# Patient Record
Sex: FEMALE | Race: WHITE | HISPANIC OR LATINO | Employment: FULL TIME | URBAN - METROPOLITAN AREA
[De-identification: names, ages, dates, MRNs, and addresses within clinical notes are randomized per-mention and may not be internally consistent; named-entity substitution may affect disease eponyms.]

---

## 2017-05-24 ENCOUNTER — NON-PROVIDER VISIT (OUTPATIENT)
Dept: URGENT CARE | Facility: PHYSICIAN GROUP | Age: 27
End: 2017-05-24

## 2017-05-24 ENCOUNTER — NON-PROVIDER VISIT (OUTPATIENT)
Dept: OCCUPATIONAL MEDICINE | Facility: CLINIC | Age: 27
End: 2017-05-24

## 2017-05-24 DIAGNOSIS — Z11.1 ENCOUNTER FOR PPD TEST: ICD-10-CM

## 2017-05-24 DIAGNOSIS — Z02.1 PRE-EMPLOYMENT DRUG SCREENING: ICD-10-CM

## 2017-05-24 LAB
AMP AMPHETAMINE: NORMAL
BAR BARBITURATES: NORMAL
BZO BENZODIAZEPINES: NORMAL
COC COCAINE: NORMAL
INT CON NEG: NEGATIVE
INT CON POS: POSITIVE
MDMA ECSTASY: NORMAL
MET METHAMPHETAMINES: NORMAL
MTD METHADONE: NORMAL
OPI OPIATES: NORMAL
OXY OXYCODONE: NORMAL
PCP PHENCYCLIDINE: NORMAL
POC URINE DRUG SCREEN OCDRS: NORMAL
THC: NORMAL

## 2017-05-24 PROCEDURE — 86580 TB INTRADERMAL TEST: CPT | Performed by: PREVENTIVE MEDICINE

## 2017-05-24 PROCEDURE — 80305 DRUG TEST PRSMV DIR OPT OBS: CPT | Performed by: FAMILY MEDICINE

## 2017-05-24 NOTE — MR AVS SNAPSHOT
Sherrell Mukherjee   2017 9:45 AM   Non-Provider Visit   MRN: [id number]    Department:  [facility]   Dept Phone:  [phone]    Description:  Female : 1990   Provider:  [facility]           Reason for Visit     Drug / Alcohol Assessment           Allergies as of 2017     No Known Allergies      You were diagnosed with     Pre-employment drug screening   [987030]         Vital Signs     Smoking Status                   Never Smoker            Basic Information     Date Of Birth Sex Race Ethnicity Preferred Language    1990 Female White Non- English      Your appointments     May 31, 2017  2:50 PM   Non Provider 1 with Jarvis Goldstein M.D.   [address]  [facility] [address] [phone]           You will be receiving a confirmation call a few days before your appointment from our automated call confirmation system.              Health Maintenance        Date Due Completion Dates    IMM HEP B VACCINE (1 of 3 - Primary Series) 1991 ---    IMM HEP A VACCINE (1 of 2 - Standard Series) 1992 ---    IMM HPV VACCINE (1 of 3 - Female 3 Dose Series) 2002 ---    IMM VARICELLA (CHICKENPOX) VACCINE (1 of 2 - 2 Dose Adolescent Series) 2004 ---    IMM DTaP/Tdap/Td Vaccine (1 - Tdap) 2010 ---    PAP SMEAR 2012 ---            Results     POCT 11 Panel Urine Drug Screen      Component    AMPHETAMINE    COCAINE    POC THC    METHAMPHETAMINES    OPIATES    PHENCYCLIDINE    BENZODIAZIPINES    BARBITURATES    METHADONE    MDMA Ecstasy    OXYCODONE    Urine Drug Screen    mfma    Internal Control Positive    Positive    Internal Control Negative    Negative                        Current Immunizations     No immunizations on file.      Below and/or attached are the medications your provider expects you to take. Review all of your home medications and newly ordered medications with your  provider and/or pharmacist. Follow medication instructions as directed by your provider and/or pharmacist. Please keep your medication list with you and share with your provider. Update the information when medications are discontinued, doses are changed, or new medications (including over-the-counter products) are added; and carry medication information at all times in the event of emergency situations     Allergies:  No Known Allergies          Medications  Valid as of: May 24, 2017 -  6:06 PM    Generic Name Brand Name Tablet Size Instructions for use    Cyclobenzaprine HCl (Tab) FLEXERIL 10 MG Take 1 Tab by mouth 3 times a day as needed.        Hydrocodone-Acetaminophen (Tab) NORCO 5-325 MG Take 1-2 Tabs by mouth every 6 hours as needed (Do not take within 6 hours of driving or working).        .                 Medicines prescribed today were sent to:     [facility] [address] [place] [address] [place] - [address] [facility] [address]    [address] [place] [address]    Phone: [phone] Fax: [phone]    Open 24 Hours?: No      Medication refill instructions:       If your prescription bottle indicates you have medication refills left, it is not necessary to call your provider’s office. Please contact your pharmacy and they will refill your medication.    If your prescription bottle indicates you do not have any refills left, you may request refills at any time through one of the following ways: The online [employer] system (except Urgent Care), by calling your provider’s office, or by asking your pharmacy to contact your provider’s office with a refill request. Medication refills are processed only during regular business hours and may not be available until the next business day. Your provider may request additional information or to have a follow-up visit with you prior to refilling your medication.   *Please Note: Medication refills are assigned a new Rx number when refilled electronically.  Your pharmacy may indicate that no refills were authorized even though a new prescription for the same medication is available at the pharmacy. Please request the medicine by name with the pharmacy before contacting your provider for a refill.        Instructions    Sherrell Mukherjee is a 26 y.o. female here for a non-provider visit for uds    If abnormal was an in office provider notified today (if so, indicate provider)? No  Routed to PCP? No              Letyano Access Code: [email or web address]  Expires: 6/18/2017  4:16 AM    Letyano  A secure, online tool to manage your health information     [employer]’s Letyano® is a secure, online tool that connects you to your personalized health information from the privacy of your home -- day or night - making it very easy for you to manage your healthcare. Once the activation process is completed, you can even access your medical information using the Letyano jessica, which is available for free in the Apple Jessica store or Google Play store.     Letyano provides the following levels of access (as shown below):   My Chart Features   [facility] Doctor [facility]  Specialists [facility]  Urgent  [facility]  Primary Care  Doctor   Email your healthcare team securely and privately 24/7 X X X    Manage appointments: schedule your next appointment; view details of past/upcoming appointments X      Request prescription refills. X      View recent personal medical records, including lab and immunizations X X X X   View health record, including health history, allergies, medications X X X X   Read reports about your outpatient visits, procedures, consult and ER notes X X X X   See your discharge summary, which is a recap of your hospital and/or ER visit that includes your diagnosis, lab results, and care plan. X X       How to register for Letyano:  1. Go to  [email or web address].  2. Click on the Sign Up Now box, which takes you to the New Member Sign Up page. You will  need to provide the following information:  a. Enter your [employer] Access Code exactly as it appears at the top of this page. (You will not need to use this code after you’ve completed the sign-up process. If you do not sign up before the expiration date, you must request a new code.)   b. Enter your date of birth.   c. Enter your home email address.   d. Click Submit, and follow the next screen’s instructions.  3. Create a Personal Life Mediat ID. This will be your Cymphonix login ID and cannot be changed, so think of one that is secure and easy to remember.  4. Create a Cymphonix password. You can change your password at any time.  5. Enter your Password Reset Question and Answer. This can be used at a later time if you forget your password.   6. Enter your e-mail address. This allows you to receive e-mail notifications when new information is available in Cymphonix.  7. Click Sign Up. You can now view your health information.    For assistance activating your Cymphonix account, call [phone]

## 2017-05-24 NOTE — MR AVS SNAPSHOT
Sherrell Rosenthal   2017 1:00 PM   Non-Provider Visit   MRN: [id number]    Department:  [facility]   Dept Phone:  [phone]    Description:  Female : 1990   Provider:  [facility] SANTANA SHER R.N.           Reason for Visit     PPD Placement           Allergies as of 2017     Not on File      You were diagnosed with     Encounter for PPD test   [[id number]]         Basic Information     Date Of Birth Sex Race Ethnicity Preferred Language    1990 Female White  Origin (Papua [place]inean,Guatemalan,Eritrean,Zackary, etc) English      Health Maintenance     Patient has no pending health maintenance at this time      Current Immunizations     Tuberculin Skin Test 2017 12:31 PM      Below and/or attached are the medications your provider expects you to take. Review all of your home medications and newly ordered medications with your provider and/or pharmacist. Follow medication instructions as directed by your provider and/or pharmacist. Please keep your medication list with you and share with your provider. Update the information when medications are discontinued, doses are changed, or new medications (including over-the-counter products) are added; and carry medication information at all times in the event of emergency situations     Allergies:  (Not on file)          Medications  Valid as of: May 24, 2017 -  3:12 PM    Generic Name Brand Name Tablet Size Instructions for use    .                 Medicines prescribed today were sent to:     None      Medication refill instructions:       If your prescription bottle indicates you have medication refills left, it is not necessary to call your provider’s office. Please contact your pharmacy and they will refill your medication.    If your prescription bottle indicates you do not have any refills left, you may request refills at any time through one of the following ways: The online [employer] system (except Urgent Care), by calling your  provider’s office, or by asking your pharmacy to contact your provider’s office with a refill request. Medication refills are processed only during regular business hours and may not be available until the next business day. Your provider may request additional information or to have a follow-up visit with you prior to refilling your medication.   *Please Note: Medication refills are assigned a new Rx number when refilled electronically. Your pharmacy may indicate that no refills were authorized even though a new prescription for the same medication is available at the pharmacy. Please request the medicine by name with the pharmacy before contacting your provider for a refill.           MyChart Status: Patient Declined

## 2017-05-24 NOTE — PATIENT INSTRUCTIONS
Sherrell Yaz is a 26 y.o. female here for a non-provider visit for uds    If abnormal was an in office provider notified today (if so, indicate provider)? No  Routed to PCP? No

## 2017-05-27 ENCOUNTER — HOSPITAL ENCOUNTER (OUTPATIENT)
Dept: RADIOLOGY | Facility: MEDICAL CENTER | Age: 27
End: 2017-05-27
Attending: NURSE PRACTITIONER

## 2017-05-27 ENCOUNTER — NON-PROVIDER VISIT (OUTPATIENT)
Dept: URGENT CARE | Facility: CLINIC | Age: 27
End: 2017-05-27

## 2017-05-27 DIAGNOSIS — R76.11 POSITIVE PPD: ICD-10-CM

## 2017-05-27 LAB — TB WHEAL 3D P 5 TU DIAM: NORMAL MM

## 2017-05-27 PROCEDURE — 71020 DX-CHEST-2 VIEWS: CPT

## 2017-05-27 NOTE — PROGRESS NOTES
Sherrell Rosenthal is a 26 y.o. female here for a non-provider visit for PPD reading -- Step 1 of 2.      1.  Resulted in Epic under enter/edit results? Yes   2.  TB evaluation questionnaire scanned into chart and original given to patient?Yes      3. Was induration greater than 0 mm? Yes, verified by Provider: Caroline Villafana.         Routed to PCP? No          Positive PPD. Pt reported she always tests positive. Born [place] of [place].   CXR ordered.

## 2017-05-27 NOTE — MR AVS SNAPSHOT
Sherrell Rosenthal   2017 9:15 AM   Non-Provider Visit   MRN: [id number]    Department:  [facility]   Dept Phone:  [phone]    Description:  Female : 1990   Provider:  YELITZA Apodaca           Reason for Visit     PPD Reading           Allergies as of 2017     Not on File      You were diagnosed with     Positive PPD   [[id number]]         Basic Information     Date Of Birth Sex Race Ethnicity Preferred Language    1990 Female White  Origin (Syriac,St Lucian,Mosotho,Zackary, etc) English      Health Maintenance     Patient has no pending health maintenance at this time      Current Immunizations     Tuberculin Skin Test 2017 12:31 PM      Below and/or attached are the medications your provider expects you to take. Review all of your home medications and newly ordered medications with your provider and/or pharmacist. Follow medication instructions as directed by your provider and/or pharmacist. Please keep your medication list with you and share with your provider. Update the information when medications are discontinued, doses are changed, or new medications (including over-the-counter products) are added; and carry medication information at all times in the event of emergency situations     Allergies:  (Not on file)          Medications  Valid as of: May 27, 2017 -  9:41 AM    Generic Name Brand Name Tablet Size Instructions for use    .                 Medicines prescribed today were sent to:     None      Medication refill instructions:       If your prescription bottle indicates you have medication refills left, it is not necessary to call your provider’s office. Please contact your pharmacy and they will refill your medication.    If your prescription bottle indicates you do not have any refills left, you may request refills at any time through one of the following ways: The online [employer] system (except Urgent Care), by calling your provider’s  office, or by asking your pharmacy to contact your provider’s office with a refill request. Medication refills are processed only during regular business hours and may not be available until the next business day. Your provider may request additional information or to have a follow-up visit with you prior to refilling your medication.   *Please Note: Medication refills are assigned a new Rx number when refilled electronically. Your pharmacy may indicate that no refills were authorized even though a new prescription for the same medication is available at the pharmacy. Please request the medicine by name with the pharmacy before contacting your provider for a refill.        Your To Do List     Future Labs/Procedures Complete By Expires    DX-CHEST-2 VIEWS  As directed 5/27/2018         [facility] Status: Patient Declined

## 2017-05-31 ENCOUNTER — NON-PROVIDER VISIT (OUTPATIENT)
Dept: OCCUPATIONAL MEDICINE | Facility: CLINIC | Age: 27
End: 2017-05-31

## 2017-05-31 VITALS
OXYGEN SATURATION: 97 % | DIASTOLIC BLOOD PRESSURE: 70 MMHG | WEIGHT: 152 LBS | SYSTOLIC BLOOD PRESSURE: 122 MMHG | BODY MASS INDEX: 28.7 KG/M2 | HEIGHT: 61 IN | TEMPERATURE: 97.3 F | RESPIRATION RATE: 12 BRPM | HEART RATE: 96 BPM

## 2017-05-31 DIAGNOSIS — Z02.89 HEALTH EXAMINATION OF DEFINED SUBPOPULATION: ICD-10-CM

## 2017-05-31 PROCEDURE — 8915 PR COMPREHENSIVE PHYSICAL: Performed by: PREVENTIVE MEDICINE

## 2018-11-27 ENCOUNTER — OFFICE VISIT (OUTPATIENT)
Dept: URGENT CARE | Facility: MEDICAL CENTER | Age: 28
End: 2018-11-27
Payer: COMMERCIAL

## 2018-11-27 VITALS
DIASTOLIC BLOOD PRESSURE: 78 MMHG | HEART RATE: 82 BPM | OXYGEN SATURATION: 99 % | BODY MASS INDEX: 32.69 KG/M2 | TEMPERATURE: 97.6 F | WEIGHT: 173 LBS | SYSTOLIC BLOOD PRESSURE: 116 MMHG

## 2018-11-27 DIAGNOSIS — M54.6 ACUTE LEFT-SIDED THORACIC BACK PAIN: ICD-10-CM

## 2018-11-27 PROCEDURE — 99213 OFFICE O/P EST LOW 20 MIN: CPT | Performed by: PHYSICIAN ASSISTANT

## 2018-11-27 ASSESSMENT — ENCOUNTER SYMPTOMS
TINGLING: 0
SENSORY CHANGE: 0
WEAKNESS: 0
PALPITATIONS: 0
MYALGIAS: 1
NUMBNESS: 0
SORE THROAT: 0
DIZZINESS: 0
FEVER: 0
SHORTNESS OF BREATH: 0
NAUSEA: 0
COUGH: 0
CHILLS: 0
SPUTUM PRODUCTION: 0
WHEEZING: 0
PARESTHESIAS: 0
FOCAL WEAKNESS: 0
SINUS PAIN: 0
PARESIS: 0
BACK PAIN: 1

## 2018-11-28 NOTE — PROGRESS NOTES
Subjective:      Sherrell Mukherjee is a 28 y.o. female who presents with Shoulder Pain (woke up with pain in left shoulder yesterday, got worse throughout day, hurts worse when she sneezes , pregnant so not taking anything)            Back Pain   This is a new problem. The current episode started today (Pain by left shoulder pain this am upon awakening ). The problem occurs constantly. The problem has been waxing and waning since onset. The quality of the pain is described as aching. The pain does not radiate. The pain is moderate. Exacerbated by: movement of left arm, breathing or sneezing  Pertinent negatives include no chest pain, fever, numbness, paresis, paresthesias, tingling or weakness. (No cough or shortness of breath. No hemoptysis or calf pain ) She has tried nothing for the symptoms.   Patient is 13 weeks pregnant.    .History reviewed. No pertinent past medical history.    History reviewed. No pertinent surgical history.    History reviewed. No pertinent family history.    No Known Allergies    Medications, Allergies, and current problem list reviewed today in Epic      Review of Systems   Constitutional: Negative for chills, fever and malaise/fatigue.   HENT: Negative for congestion, ear discharge, ear pain, sinus pain and sore throat.    Respiratory: Negative for cough, sputum production, shortness of breath and wheezing.    Cardiovascular: Negative for chest pain, palpitations and leg swelling.   Gastrointestinal: Negative for nausea.   Musculoskeletal: Positive for back pain and myalgias.   Skin: Negative for rash.   Neurological: Negative for dizziness, tingling, sensory change, focal weakness, weakness, numbness and paresthesias.     All other systems reviewed and are negative.        Objective:     /78 (BP Location: Right arm, Patient Position: Sitting, BP Cuff Size: Adult)   Pulse 82   Temp 36.4 °C (97.6 °F) (Temporal)   Wt 78.5 kg (173 lb)   SpO2 99%   BMI 32.69 kg/m²       Physical Exam   Constitutional: She is oriented to person, place, and time. She appears well-developed and well-nourished. No distress.   HENT:   Head: Normocephalic and atraumatic.   Mouth/Throat: Oropharynx is clear and moist.   Eyes: Conjunctivae are normal.   Neck: Neck supple.   Cardiovascular: Normal rate, regular rhythm and normal heart sounds.  Exam reveals no gallop and no friction rub.    No murmur heard.  Pulmonary/Chest: Effort normal. No respiratory distress. She has no decreased breath sounds. She has no wheezes. She has no rhonchi. She has no rales.           Musculoskeletal:   Lower extremities without edema or erythema. NTTP.   Lymphadenopathy:     She has no cervical adenopathy.   Neurological: She is alert and oriented to person, place, and time. No cranial nerve deficit.   Skin: Skin is warm and dry. No rash noted.   Psychiatric: She has a normal mood and affect. Her behavior is normal. Judgment and thought content normal.               Assessment/Plan:     1. Acute left-sided thoracic back pain    - vitals normal. Lung sounds clear.      - pain reproduced with left arm movement and palpation suggesting  musculoskeletal origin for pain.  - encouraged rest, ice, heat, OTC Tylenol.  NO IBUPROFEN     Differential diagnoses, Supportive care, and indications for immediate follow-up discussed with patient.   Instructed to return to clinic or nearest emergency department for any change in condition, further concerns, or worsening of symptoms.    The patient demonstrated a good understanding and agreed with the treatment plan.    Ginna Tineo P.A.-C.

## 2018-12-03 ENCOUNTER — APPOINTMENT (OUTPATIENT)
Dept: OBGYN | Facility: CLINIC | Age: 28
End: 2018-12-03
Payer: COMMERCIAL

## 2019-01-24 ENCOUNTER — HOSPITAL ENCOUNTER (OUTPATIENT)
Dept: HOSPITAL 8 - LDOP | Age: 29
Discharge: HOME | End: 2019-01-24
Attending: OBSTETRICS & GYNECOLOGY
Payer: COMMERCIAL

## 2019-01-24 VITALS — BODY MASS INDEX: 32.86 KG/M2 | HEIGHT: 62 IN | WEIGHT: 178.57 LBS

## 2019-01-24 DIAGNOSIS — O26.892: Primary | ICD-10-CM

## 2019-01-24 DIAGNOSIS — R10.2: ICD-10-CM

## 2019-01-24 DIAGNOSIS — Z3A.21: ICD-10-CM

## 2019-01-24 LAB — MICROSCOPIC: (no result)

## 2019-01-24 PROCEDURE — 81003 URINALYSIS AUTO W/O SCOPE: CPT

## 2019-01-24 PROCEDURE — 87086 URINE CULTURE/COLONY COUNT: CPT

## 2019-01-24 PROCEDURE — 99201: CPT

## 2019-01-24 PROCEDURE — G0463 HOSPITAL OUTPT CLINIC VISIT: HCPCS

## 2019-01-24 PROCEDURE — 59025 FETAL NON-STRESS TEST: CPT

## 2019-03-25 ENCOUNTER — HOSPITAL ENCOUNTER (EMERGENCY)
Dept: HOSPITAL 8 - ED | Age: 29
Discharge: HOME | End: 2019-03-25
Payer: COMMERCIAL

## 2019-03-25 VITALS — DIASTOLIC BLOOD PRESSURE: 62 MMHG | SYSTOLIC BLOOD PRESSURE: 122 MMHG

## 2019-03-25 VITALS — WEIGHT: 186.29 LBS | BODY MASS INDEX: 35.17 KG/M2 | HEIGHT: 61 IN

## 2019-03-25 DIAGNOSIS — Z3A.29: ICD-10-CM

## 2019-03-25 DIAGNOSIS — G44.209: ICD-10-CM

## 2019-03-25 DIAGNOSIS — O26.893: Primary | ICD-10-CM

## 2019-03-25 LAB
ALBUMIN SERPL-MCNC: 2.6 G/DL (ref 3.4–5)
ALP SERPL-CCNC: 80 U/L (ref 45–117)
ALT SERPL-CCNC: 25 U/L (ref 12–78)
ANION GAP SERPL CALC-SCNC: 9 MMOL/L (ref 5–15)
BASOPHILS # BLD AUTO: 0.04 X10^3/UL (ref 0–0.1)
BASOPHILS NFR BLD AUTO: 1 % (ref 0–1)
BILIRUB SERPL-MCNC: 0.3 MG/DL (ref 0.2–1)
CALCIUM SERPL-MCNC: 8.2 MG/DL (ref 8.5–10.1)
CHLORIDE SERPL-SCNC: 111 MMOL/L (ref 98–107)
CREAT SERPL-MCNC: 0.61 MG/DL (ref 0.55–1.02)
CULTURE INDICATED?: YES
EOSINOPHIL # BLD AUTO: 0.1 X10^3/UL (ref 0–0.4)
EOSINOPHIL NFR BLD AUTO: 1 % (ref 1–7)
ERYTHROCYTE [DISTWIDTH] IN BLOOD BY AUTOMATED COUNT: 13.7 % (ref 9.6–15.2)
LYMPHOCYTES # BLD AUTO: 2.14 X10^3/UL (ref 1–3.4)
LYMPHOCYTES NFR BLD AUTO: 26 % (ref 22–44)
MCH RBC QN AUTO: 29.6 PG (ref 27–34.8)
MCHC RBC AUTO-ENTMCNC: 34 G/DL (ref 32.4–35.8)
MCV RBC AUTO: 87 FL (ref 80–100)
MD: NO
MICROSCOPIC: (no result)
MONOCYTES # BLD AUTO: 0.37 X10^3/UL (ref 0.2–0.8)
MONOCYTES NFR BLD AUTO: 5 % (ref 2–9)
NEUTROPHILS # BLD AUTO: 5.66 X10^3/UL (ref 1.8–6.8)
NEUTROPHILS NFR BLD AUTO: 68 % (ref 42–75)
PLATELET # BLD AUTO: 316 X10^3/UL (ref 130–400)
PMV BLD AUTO: 8.7 FL (ref 7.4–10.4)
PROT SERPL-MCNC: 6.9 G/DL (ref 6.4–8.2)
RBC # BLD AUTO: 4.31 X10^6/UL (ref 3.82–5.3)

## 2019-03-25 PROCEDURE — 85025 COMPLETE CBC W/AUTO DIFF WBC: CPT

## 2019-03-25 PROCEDURE — 64405 NJX AA&/STRD GR OCPL NRV: CPT

## 2019-03-25 PROCEDURE — 80053 COMPREHEN METABOLIC PANEL: CPT

## 2019-03-25 PROCEDURE — 87086 URINE CULTURE/COLONY COUNT: CPT

## 2019-03-25 PROCEDURE — 36415 COLL VENOUS BLD VENIPUNCTURE: CPT

## 2019-03-25 PROCEDURE — 99284 EMERGENCY DEPT VISIT MOD MDM: CPT

## 2019-03-25 PROCEDURE — 81001 URINALYSIS AUTO W/SCOPE: CPT

## 2019-05-15 ENCOUNTER — HOSPITAL ENCOUNTER (INPATIENT)
Dept: HOSPITAL 8 - LDIP | Age: 29
LOS: 4 days | Discharge: HOME | End: 2019-05-19
Attending: OBSTETRICS & GYNECOLOGY | Admitting: OBSTETRICS & GYNECOLOGY
Payer: COMMERCIAL

## 2019-05-15 VITALS — SYSTOLIC BLOOD PRESSURE: 107 MMHG | DIASTOLIC BLOOD PRESSURE: 46 MMHG

## 2019-05-15 VITALS — SYSTOLIC BLOOD PRESSURE: 100 MMHG | DIASTOLIC BLOOD PRESSURE: 68 MMHG

## 2019-05-15 VITALS — SYSTOLIC BLOOD PRESSURE: 105 MMHG | DIASTOLIC BLOOD PRESSURE: 72 MMHG

## 2019-05-15 VITALS — DIASTOLIC BLOOD PRESSURE: 54 MMHG | SYSTOLIC BLOOD PRESSURE: 92 MMHG

## 2019-05-15 VITALS — SYSTOLIC BLOOD PRESSURE: 112 MMHG | DIASTOLIC BLOOD PRESSURE: 73 MMHG

## 2019-05-15 VITALS — DIASTOLIC BLOOD PRESSURE: 66 MMHG | SYSTOLIC BLOOD PRESSURE: 101 MMHG

## 2019-05-15 VITALS — DIASTOLIC BLOOD PRESSURE: 70 MMHG | SYSTOLIC BLOOD PRESSURE: 100 MMHG

## 2019-05-15 VITALS — SYSTOLIC BLOOD PRESSURE: 95 MMHG | DIASTOLIC BLOOD PRESSURE: 65 MMHG

## 2019-05-15 VITALS — SYSTOLIC BLOOD PRESSURE: 93 MMHG | DIASTOLIC BLOOD PRESSURE: 63 MMHG

## 2019-05-15 VITALS — DIASTOLIC BLOOD PRESSURE: 70 MMHG | SYSTOLIC BLOOD PRESSURE: 105 MMHG

## 2019-05-15 VITALS — DIASTOLIC BLOOD PRESSURE: 60 MMHG | SYSTOLIC BLOOD PRESSURE: 95 MMHG

## 2019-05-15 VITALS — DIASTOLIC BLOOD PRESSURE: 61 MMHG | SYSTOLIC BLOOD PRESSURE: 93 MMHG

## 2019-05-15 VITALS — SYSTOLIC BLOOD PRESSURE: 109 MMHG | DIASTOLIC BLOOD PRESSURE: 70 MMHG

## 2019-05-15 VITALS — SYSTOLIC BLOOD PRESSURE: 97 MMHG | DIASTOLIC BLOOD PRESSURE: 68 MMHG

## 2019-05-15 VITALS — SYSTOLIC BLOOD PRESSURE: 98 MMHG | DIASTOLIC BLOOD PRESSURE: 65 MMHG

## 2019-05-15 VITALS — SYSTOLIC BLOOD PRESSURE: 112 MMHG | DIASTOLIC BLOOD PRESSURE: 50 MMHG

## 2019-05-15 VITALS — DIASTOLIC BLOOD PRESSURE: 53 MMHG | SYSTOLIC BLOOD PRESSURE: 96 MMHG

## 2019-05-15 VITALS — SYSTOLIC BLOOD PRESSURE: 88 MMHG | DIASTOLIC BLOOD PRESSURE: 54 MMHG

## 2019-05-15 VITALS — SYSTOLIC BLOOD PRESSURE: 114 MMHG | DIASTOLIC BLOOD PRESSURE: 67 MMHG

## 2019-05-15 VITALS — SYSTOLIC BLOOD PRESSURE: 118 MMHG | DIASTOLIC BLOOD PRESSURE: 69 MMHG

## 2019-05-15 VITALS — SYSTOLIC BLOOD PRESSURE: 100 MMHG | DIASTOLIC BLOOD PRESSURE: 67 MMHG

## 2019-05-15 VITALS — BODY MASS INDEX: 35.75 KG/M2 | HEIGHT: 61 IN | WEIGHT: 189.38 LBS

## 2019-05-15 DIAGNOSIS — I48.91: ICD-10-CM

## 2019-05-15 DIAGNOSIS — O34.13: Primary | ICD-10-CM

## 2019-05-15 DIAGNOSIS — D25.9: ICD-10-CM

## 2019-05-15 DIAGNOSIS — Z3A.37: ICD-10-CM

## 2019-05-15 DIAGNOSIS — O32.2XX0: ICD-10-CM

## 2019-05-15 LAB
BASOPHILS # BLD AUTO: 0.03 X10^3/UL (ref 0–0.1)
BASOPHILS # BLD AUTO: 0.03 X10^3/UL (ref 0–0.1)
BASOPHILS NFR BLD AUTO: 0 % (ref 0–1)
BASOPHILS NFR BLD AUTO: 0 % (ref 0–1)
EOSINOPHIL # BLD AUTO: 0.01 X10^3/UL (ref 0–0.4)
EOSINOPHIL # BLD AUTO: 0.13 X10^3/UL (ref 0–0.4)
EOSINOPHIL NFR BLD AUTO: 0 % (ref 1–7)
EOSINOPHIL NFR BLD AUTO: 2 % (ref 1–7)
ERYTHROCYTE [DISTWIDTH] IN BLOOD BY AUTOMATED COUNT: 14.5 % (ref 9.6–15.2)
ERYTHROCYTE [DISTWIDTH] IN BLOOD BY AUTOMATED COUNT: 15.1 % (ref 9.6–15.2)
LYMPHOCYTES # BLD AUTO: 1.42 X10^3/UL (ref 1–3.4)
LYMPHOCYTES # BLD AUTO: 2.11 X10^3/UL (ref 1–3.4)
LYMPHOCYTES NFR BLD AUTO: 24 % (ref 22–44)
LYMPHOCYTES NFR BLD AUTO: 9 % (ref 22–44)
MCH RBC QN AUTO: 29 PG (ref 27–34.8)
MCH RBC QN AUTO: 29 PG (ref 27–34.8)
MCHC RBC AUTO-ENTMCNC: 33.7 G/DL (ref 32.4–35.8)
MCHC RBC AUTO-ENTMCNC: 33.8 G/DL (ref 32.4–35.8)
MCV RBC AUTO: 85.8 FL (ref 80–100)
MCV RBC AUTO: 86 FL (ref 80–100)
MD: NO
MD: NO
MONOCYTES # BLD AUTO: 0.5 X10^3/UL (ref 0.2–0.8)
MONOCYTES # BLD AUTO: 0.62 X10^3/UL (ref 0.2–0.8)
MONOCYTES NFR BLD AUTO: 3 % (ref 2–9)
MONOCYTES NFR BLD AUTO: 7 % (ref 2–9)
NEUTROPHILS # BLD AUTO: 14.19 X10^3/UL (ref 1.8–6.8)
NEUTROPHILS # BLD AUTO: 5.91 X10^3/UL (ref 1.8–6.8)
NEUTROPHILS NFR BLD AUTO: 67 % (ref 42–75)
NEUTROPHILS NFR BLD AUTO: 88 % (ref 42–75)
PLATELET # BLD AUTO: 275 X10^3/UL (ref 130–400)
PLATELET # BLD AUTO: 305 X10^3/UL (ref 130–400)
PMV BLD AUTO: 8.7 FL (ref 7.4–10.4)
PMV BLD AUTO: 9 FL (ref 7.4–10.4)
RBC # BLD AUTO: 3.96 X10^6/UL (ref 3.82–5.3)
RBC # BLD AUTO: 4.15 X10^6/UL (ref 3.82–5.3)

## 2019-05-15 PROCEDURE — 93005 ELECTROCARDIOGRAM TRACING: CPT

## 2019-05-15 PROCEDURE — 86923 COMPATIBILITY TEST ELECTRIC: CPT

## 2019-05-15 PROCEDURE — 86850 RBC ANTIBODY SCREEN: CPT

## 2019-05-15 PROCEDURE — 93306 TTE W/DOPPLER COMPLETE: CPT

## 2019-05-15 PROCEDURE — 80048 BASIC METABOLIC PNL TOTAL CA: CPT

## 2019-05-15 PROCEDURE — 36415 COLL VENOUS BLD VENIPUNCTURE: CPT

## 2019-05-15 PROCEDURE — 92960 CARDIOVERSION ELECTRIC EXT: CPT

## 2019-05-15 PROCEDURE — 5A2204Z RESTORATION OF CARDIAC RHYTHM, SINGLE: ICD-10-PCS | Performed by: INTERNAL MEDICINE

## 2019-05-15 PROCEDURE — 86900 BLOOD TYPING SEROLOGIC ABO: CPT

## 2019-05-15 PROCEDURE — 85025 COMPLETE CBC W/AUTO DIFF WBC: CPT

## 2019-05-15 PROCEDURE — 82803 BLOOD GASES ANY COMBINATION: CPT

## 2019-05-15 RX ADMIN — SODIUM CHLORIDE, SODIUM LACTATE, POTASSIUM CHLORIDE, AND CALCIUM CHLORIDE SCH MLS/HR: .6; .31; .03; .02 INJECTION, SOLUTION INTRAVENOUS at 23:43

## 2019-05-15 RX ADMIN — SODIUM CHLORIDE, SODIUM LACTATE, POTASSIUM CHLORIDE, AND CALCIUM CHLORIDE SCH MLS/HR: .6; .31; .03; .02 INJECTION, SOLUTION INTRAVENOUS at 17:41

## 2019-05-15 RX ADMIN — PRENATAL VIT W/ FE FUMARATE-FA TAB 27-0.8 MG SCH EACH: 27-0.8 TAB at 09:00

## 2019-05-15 RX ADMIN — Medication SCH MLS/HR: at 09:06

## 2019-05-15 RX ADMIN — SODIUM CHLORIDE, SODIUM LACTATE, POTASSIUM CHLORIDE, AND CALCIUM CHLORIDE SCH MLS/HR: .6; .31; .03; .02 INJECTION, SOLUTION INTRAVENOUS at 07:25

## 2019-05-15 RX ADMIN — OXYCODONE HYDROCHLORIDE AND ACETAMINOPHEN PRN TAB: 5; 325 TABLET ORAL at 20:46

## 2019-05-15 RX ADMIN — SODIUM CHLORIDE, SODIUM LACTATE, POTASSIUM CHLORIDE, AND CALCIUM CHLORIDE SCH MLS/HR: .6; .31; .03; .02 INJECTION, SOLUTION INTRAVENOUS at 11:20

## 2019-05-15 RX ADMIN — Medication SCH MLS/HR: at 19:47

## 2019-05-15 RX ADMIN — SODIUM CHLORIDE, SODIUM LACTATE, POTASSIUM CHLORIDE, AND CALCIUM CHLORIDE SCH MLS/HR: .6; .31; .03; .02 INJECTION, SOLUTION INTRAVENOUS at 09:07

## 2019-05-16 VITALS — DIASTOLIC BLOOD PRESSURE: 58 MMHG | SYSTOLIC BLOOD PRESSURE: 95 MMHG

## 2019-05-16 VITALS — SYSTOLIC BLOOD PRESSURE: 125 MMHG | DIASTOLIC BLOOD PRESSURE: 88 MMHG

## 2019-05-16 VITALS — SYSTOLIC BLOOD PRESSURE: 97 MMHG | DIASTOLIC BLOOD PRESSURE: 63 MMHG

## 2019-05-16 VITALS — SYSTOLIC BLOOD PRESSURE: 111 MMHG | DIASTOLIC BLOOD PRESSURE: 74 MMHG

## 2019-05-16 LAB
ANION GAP SERPL CALC-SCNC: 8 MMOL/L (ref 5–15)
CALCIUM SERPL-MCNC: 7.9 MG/DL (ref 8.5–10.1)
CHLORIDE SERPL-SCNC: 110 MMOL/L (ref 98–107)
CREAT SERPL-MCNC: 0.42 MG/DL (ref 0.55–1.02)

## 2019-05-16 RX ADMIN — SODIUM CHLORIDE, SODIUM LACTATE, POTASSIUM CHLORIDE, AND CALCIUM CHLORIDE SCH MLS/HR: .6; .31; .03; .02 INJECTION, SOLUTION INTRAVENOUS at 07:21

## 2019-05-16 RX ADMIN — PRENATAL VIT W/ FE FUMARATE-FA TAB 27-0.8 MG SCH EACH: 27-0.8 TAB at 07:21

## 2019-05-16 RX ADMIN — Medication SCH MLS/HR: at 03:30

## 2019-05-16 RX ADMIN — SODIUM CHLORIDE, SODIUM LACTATE, POTASSIUM CHLORIDE, AND CALCIUM CHLORIDE SCH MLS/HR: .6; .31; .03; .02 INJECTION, SOLUTION INTRAVENOUS at 03:30

## 2019-05-16 RX ADMIN — SODIUM CHLORIDE, SODIUM LACTATE, POTASSIUM CHLORIDE, AND CALCIUM CHLORIDE SCH MLS/HR: .6; .31; .03; .02 INJECTION, SOLUTION INTRAVENOUS at 23:43

## 2019-05-16 RX ADMIN — Medication SCH MLS/HR: at 23:03

## 2019-05-16 RX ADMIN — OXYCODONE HYDROCHLORIDE AND ACETAMINOPHEN PRN TAB: 5; 325 TABLET ORAL at 08:33

## 2019-05-16 RX ADMIN — Medication SCH MLS/HR: at 23:43

## 2019-05-16 RX ADMIN — OXYCODONE HYDROCHLORIDE AND ACETAMINOPHEN PRN TAB: 5; 325 TABLET ORAL at 03:39

## 2019-05-16 RX ADMIN — OXYCODONE HYDROCHLORIDE AND ACETAMINOPHEN PRN TAB: 5; 325 TABLET ORAL at 21:07

## 2019-05-16 RX ADMIN — OXYCODONE HYDROCHLORIDE AND ACETAMINOPHEN PRN TAB: 5; 325 TABLET ORAL at 17:21

## 2019-05-16 RX ADMIN — DOCUSATE SODIUM PRN MG: 100 CAPSULE, LIQUID FILLED ORAL at 21:07

## 2019-05-16 RX ADMIN — SODIUM CHLORIDE, SODIUM LACTATE, POTASSIUM CHLORIDE, AND CALCIUM CHLORIDE SCH MLS/HR: .6; .31; .03; .02 INJECTION, SOLUTION INTRAVENOUS at 23:03

## 2019-05-16 RX ADMIN — OXYCODONE HYDROCHLORIDE AND ACETAMINOPHEN PRN TAB: 5; 325 TABLET ORAL at 13:25

## 2019-05-17 VITALS — SYSTOLIC BLOOD PRESSURE: 102 MMHG | DIASTOLIC BLOOD PRESSURE: 68 MMHG

## 2019-05-17 VITALS — SYSTOLIC BLOOD PRESSURE: 115 MMHG | DIASTOLIC BLOOD PRESSURE: 76 MMHG

## 2019-05-17 RX ADMIN — IBUPROFEN PRN MG: 600 TABLET ORAL at 14:32

## 2019-05-17 RX ADMIN — Medication SCH MLS/HR: at 19:25

## 2019-05-17 RX ADMIN — PRENATAL VIT W/ FE FUMARATE-FA TAB 27-0.8 MG SCH EACH: 27-0.8 TAB at 08:22

## 2019-05-17 RX ADMIN — SODIUM CHLORIDE, SODIUM LACTATE, POTASSIUM CHLORIDE, AND CALCIUM CHLORIDE SCH MLS/HR: .6; .31; .03; .02 INJECTION, SOLUTION INTRAVENOUS at 23:25

## 2019-05-17 RX ADMIN — SODIUM CHLORIDE, SODIUM LACTATE, POTASSIUM CHLORIDE, AND CALCIUM CHLORIDE SCH MLS/HR: .6; .31; .03; .02 INJECTION, SOLUTION INTRAVENOUS at 07:25

## 2019-05-17 RX ADMIN — DOCUSATE SODIUM PRN MG: 100 CAPSULE, LIQUID FILLED ORAL at 08:22

## 2019-05-17 RX ADMIN — OXYCODONE HYDROCHLORIDE AND ACETAMINOPHEN PRN TAB: 5; 325 TABLET ORAL at 02:00

## 2019-05-17 RX ADMIN — IBUPROFEN PRN MG: 600 TABLET ORAL at 08:22

## 2019-05-17 RX ADMIN — SODIUM CHLORIDE, SODIUM LACTATE, POTASSIUM CHLORIDE, AND CALCIUM CHLORIDE SCH MLS/HR: .6; .31; .03; .02 INJECTION, SOLUTION INTRAVENOUS at 09:25

## 2019-05-17 RX ADMIN — SODIUM CHLORIDE, SODIUM LACTATE, POTASSIUM CHLORIDE, AND CALCIUM CHLORIDE SCH MLS/HR: .6; .31; .03; .02 INJECTION, SOLUTION INTRAVENOUS at 15:25

## 2019-05-17 RX ADMIN — SODIUM CHLORIDE, SODIUM LACTATE, POTASSIUM CHLORIDE, AND CALCIUM CHLORIDE SCH MLS/HR: .6; .31; .03; .02 INJECTION, SOLUTION INTRAVENOUS at 19:25

## 2019-05-17 RX ADMIN — DOCUSATE SODIUM PRN MG: 100 CAPSULE, LIQUID FILLED ORAL at 21:24

## 2019-05-17 RX ADMIN — IBUPROFEN PRN MG: 600 TABLET ORAL at 21:24

## 2019-05-17 RX ADMIN — Medication SCH MLS/HR: at 09:25

## 2019-05-18 VITALS — SYSTOLIC BLOOD PRESSURE: 118 MMHG | DIASTOLIC BLOOD PRESSURE: 79 MMHG

## 2019-05-18 VITALS — SYSTOLIC BLOOD PRESSURE: 123 MMHG | DIASTOLIC BLOOD PRESSURE: 78 MMHG

## 2019-05-18 RX ADMIN — SODIUM CHLORIDE, SODIUM LACTATE, POTASSIUM CHLORIDE, AND CALCIUM CHLORIDE SCH MLS/HR: .6; .31; .03; .02 INJECTION, SOLUTION INTRAVENOUS at 05:25

## 2019-05-18 RX ADMIN — SODIUM CHLORIDE, SODIUM LACTATE, POTASSIUM CHLORIDE, AND CALCIUM CHLORIDE SCH MLS/HR: .6; .31; .03; .02 INJECTION, SOLUTION INTRAVENOUS at 23:25

## 2019-05-18 RX ADMIN — SODIUM CHLORIDE, SODIUM LACTATE, POTASSIUM CHLORIDE, AND CALCIUM CHLORIDE SCH MLS/HR: .6; .31; .03; .02 INJECTION, SOLUTION INTRAVENOUS at 15:25

## 2019-05-18 RX ADMIN — DOCUSATE SODIUM PRN MG: 100 CAPSULE, LIQUID FILLED ORAL at 21:16

## 2019-05-18 RX ADMIN — SODIUM CHLORIDE, SODIUM LACTATE, POTASSIUM CHLORIDE, AND CALCIUM CHLORIDE SCH MLS/HR: .6; .31; .03; .02 INJECTION, SOLUTION INTRAVENOUS at 07:25

## 2019-05-18 RX ADMIN — IBUPROFEN PRN MG: 600 TABLET ORAL at 14:56

## 2019-05-18 RX ADMIN — IBUPROFEN PRN MG: 600 TABLET ORAL at 21:16

## 2019-05-18 RX ADMIN — PRENATAL VIT W/ FE FUMARATE-FA TAB 27-0.8 MG SCH EACH: 27-0.8 TAB at 09:03

## 2019-05-18 RX ADMIN — Medication SCH MLS/HR: at 05:25

## 2019-05-18 RX ADMIN — DOCUSATE SODIUM PRN MG: 100 CAPSULE, LIQUID FILLED ORAL at 09:03

## 2019-05-18 RX ADMIN — OXYCODONE HYDROCHLORIDE AND ACETAMINOPHEN PRN TAB: 5; 325 TABLET ORAL at 02:20

## 2019-05-18 RX ADMIN — IBUPROFEN PRN MG: 600 TABLET ORAL at 09:03

## 2019-05-18 RX ADMIN — Medication SCH MLS/HR: at 15:25

## 2019-05-19 VITALS — SYSTOLIC BLOOD PRESSURE: 128 MMHG | DIASTOLIC BLOOD PRESSURE: 79 MMHG

## 2019-05-19 RX ADMIN — PRENATAL VIT W/ FE FUMARATE-FA TAB 27-0.8 MG SCH EACH: 27-0.8 TAB at 09:00

## 2019-05-19 RX ADMIN — IBUPROFEN PRN MG: 600 TABLET ORAL at 03:47

## 2019-05-19 RX ADMIN — DOCUSATE SODIUM PRN MG: 100 CAPSULE, LIQUID FILLED ORAL at 09:57

## 2019-05-19 RX ADMIN — IBUPROFEN PRN MG: 600 TABLET ORAL at 09:57

## 2019-05-19 RX ADMIN — PRENATAL VIT W/ FE FUMARATE-FA TAB 27-0.8 MG SCH EACH: 27-0.8 TAB at 09:57

## 2019-12-20 ENCOUNTER — OFFICE VISIT (OUTPATIENT)
Dept: URGENT CARE | Facility: CLINIC | Age: 29
End: 2019-12-20
Payer: COMMERCIAL

## 2019-12-20 VITALS
RESPIRATION RATE: 16 BRPM | DIASTOLIC BLOOD PRESSURE: 82 MMHG | HEIGHT: 61 IN | OXYGEN SATURATION: 94 % | TEMPERATURE: 98 F | SYSTOLIC BLOOD PRESSURE: 122 MMHG | HEART RATE: 90 BPM | BODY MASS INDEX: 36.06 KG/M2 | WEIGHT: 191 LBS

## 2019-12-20 DIAGNOSIS — R05.9 COUGH: ICD-10-CM

## 2019-12-20 DIAGNOSIS — J02.9 SORE THROAT: ICD-10-CM

## 2019-12-20 DIAGNOSIS — Z20.828 EXPOSURE TO THE FLU: Primary | ICD-10-CM

## 2019-12-20 DIAGNOSIS — R09.89 RUNNY NOSE: ICD-10-CM

## 2019-12-20 LAB
FLUAV+FLUBV AG SPEC QL IA: NEGATIVE
INT CON NEG: NEGATIVE
INT CON NEG: NORMAL
INT CON POS: NORMAL
INT CON POS: POSITIVE
S PYO AG THROAT QL: NEGATIVE

## 2019-12-20 PROCEDURE — 87880 STREP A ASSAY W/OPTIC: CPT | Performed by: FAMILY MEDICINE

## 2019-12-20 PROCEDURE — 99214 OFFICE O/P EST MOD 30 MIN: CPT | Performed by: FAMILY MEDICINE

## 2019-12-20 PROCEDURE — 87804 INFLUENZA ASSAY W/OPTIC: CPT | Performed by: FAMILY MEDICINE

## 2019-12-20 RX ORDER — ESCITALOPRAM OXALATE 10 MG/1
10 TABLET ORAL DAILY
Status: ON HOLD | COMMUNITY
Start: 2019-11-18 | End: 2023-01-31

## 2019-12-20 RX ORDER — OSELTAMIVIR PHOSPHATE 75 MG/1
75 CAPSULE ORAL DAILY
Qty: 7 CAP | Refills: 0 | Status: SHIPPED | OUTPATIENT
Start: 2019-12-20 | End: 2020-12-07

## 2019-12-20 ASSESSMENT — ENCOUNTER SYMPTOMS
DIAPHORESIS: 0
CHILLS: 1
ABDOMINAL PAIN: 0
FEVER: 1
HEADACHES: 0
CARDIOVASCULAR NEGATIVE: 1
TROUBLE SWALLOWING: 0
DIARRHEA: 0
NECK PAIN: 0
EYES NEGATIVE: 1
COUGH: 1
HOARSE VOICE: 0
VOMITING: 0
SHORTNESS OF BREATH: 0
SWOLLEN GLANDS: 0
STRIDOR: 0
WEIGHT LOSS: 0

## 2019-12-20 NOTE — LETTER
December 20, 2019         Patient: Sherrell Mukherjee   YOB: 1990   Date of Visit: 12/20/2019           To Whom it May Concern:    Sherrell Mukherjee was seen in my clinic on 12/20/2019. She may return to work on 12/23/19  If you have any questions or concerns, please don't hesitate to call.        Sincerely,           Vikash Huerta M.D.  Electronically Signed

## 2019-12-21 NOTE — PATIENT INSTRUCTIONS
"Influenza A (H1N1)  H1N1 formerly called \"swine flu\" is a new influenza virus causing sickness in people. The H1N1 virus is different from seasonal influenza viruses. However, the H1N1 symptoms are similar to seasonal influenza and it is spread from person to person. You may be at higher risk for serious problems if you have underlying serious medical conditions. The CDC and the World Health Organization are following reported cases around the world.  CAUSES   · The flu is thought to spread mainly person-to-person through coughing or sneezing of infected people.  · A person may become infected by touching something with the virus on it and then touching their mouth or nose.  SYMPTOMS   · Fever.  · Headache.  · Tiredness.  · Cough.  · Sore throat.  · Runny or stuffy nose.  · Body aches.  · Diarrhea and vomiting  These symptoms are referred to as \"flu-like symptoms.\" A lot of different illnesses, including the common cold, may have similar symptoms.  DIAGNOSIS   · There are tests that can tell if you have the H1N1 virus.  · Confirmed cases of H1N1 will be reported to the state or local health department.  · A doctor's exam may be needed to tell whether you have an infection that is a complication of the flu.  HOME CARE INSTRUCTIONS   · Stay informed. Visit the CDC website for current recommendations. Visit www.cdc.gov/D5S9yrh/. You may also call 2-191-FQS-INFO (1-496.486.2084).  · Get help early if you develop any of the above symptoms.  · If you are at high risk from complications of the flu, talk to your caregiver as soon as you develop flu-like symptoms. Those at higher risk for complications include:  · People 65 years or older.  · People with chronic medical conditions.  · Pregnant women.  · Young children.  · Your caregiver may recommend antiviral medicine to help treat the flu.  · If you get the flu, get plenty of rest, drink enough water and fluids to keep your urine clear or pale yellow, and avoid using " alcohol or tobacco.  · You may take over-the-counter medicine to relieve the symptoms of the flu if your caregiver approves. (Never give aspirin to children or teenagers who have flu-like symptoms, particularly fever).  TREATMENT   If you do get sick, antiviral drugs are available. These drugs can make your illness milder and make you feel better faster. Treatment should start soon after illness starts. It is only effective if taken within the first day of becoming ill. Only your caregiver can prescribe antiviral medication.   PREVENTION   · Cover your nose and mouth with a tissue or your arm when you cough or sneeze. Throw the tissue away.  · Wash your hands often with soap and warm water, especially after you cough or sneeze. Alcohol-based  are also effective against germs.  · Avoid touching your eyes, nose or mouth. This is one way germs spread.  · Try to avoid contact with sick people. Follow public health advice regarding school closures. Avoid crowds.  · Stay home if you get sick. Limit contact with others to keep from infecting them. People infected with the H1N1 virus may be able to infect others anywhere from 1 day before feeling sick to 5-7 days after getting flu symptoms.  · An H1N1 vaccine is available to help protect against the virus. In addition to the H1N1 vaccine, you will need to be vaccinated for seasonal influenza. The H1N1 and seasonal vaccines may be given on the same day. The CDC especially recommends the H1N1 vaccine for:  · Pregnant women.  · People who live with or care for children younger than 6 months of age.  · Health care and emergency services personnel.  · Persons between the ages of 6 months through 24 years of age.  · People from ages 25 through 64 years who are at higher risk for H1N1 because of chronic health disorders or immune system problems.  FACEMASKS  In community and home settings, the use of facemasks and N95 respirators are not normally recommended. In certain  circumstances, a facemask or N95 respirator may be used for persons at increased risk of severe illness from influenza. Your caregiver can give additional recommendations for facemask use.  IN CHILDREN, EMERGENCY WARNING SIGNS THAT NEED URGENT MEDICAL CARE:  · Fast breathing or trouble breathing.  · Bluish skin color.  · Not drinking enough fluids.  · Not waking up or not interacting normally.  · Being so fussy that the child does not want to be held.  · Your child has an oral temperature above 102° F (38.9° C), not controlled by medicine.  · Your baby is older than 3 months with a rectal temperature of 102° F (38.9° C) or higher.  · Your baby is 3 months old or younger with a rectal temperature of 100.4° F (38° C) or higher.  · Flu-like symptoms improve but then return with fever and worse cough.  IN ADULTS, EMERGENCY WARNING SIGNS THAT NEED URGENT MEDICAL CARE:  · Difficulty breathing or shortness of breath.  · Pain or pressure in the chest or abdomen.  · Sudden dizziness.  · Confusion.  · Severe or persistent vomiting.  · Bluish color.  · You have a oral temperature above 102° F (38.9° C), not controlled by medicine.  · Flu-like symptoms improve but return with fever and worse cough.  SEEK IMMEDIATE MEDICAL CARE IF:   You or someone you know is experiencing any of the above symptoms. When you arrive at the emergency center, report that you think you have the flu. You may be asked to wear a mask and/or sit in a secluded area to protect others from getting sick.  MAKE SURE YOU:   · Understand these instructions.  · Will watch your condition.  · Will get help right away if you are not doing well or get worse.  Some of this information courtesy of the CDC.   Document Released: 06/05/2009 Document Revised: 03/11/2013 Document Reviewed: 06/05/2009  ExitCare® Patient Information ©2014 [employer].

## 2019-12-21 NOTE — PROGRESS NOTES
"Subjective:      Sherrell Mukherjee is a 29 y.o. female who presents with Pharyngitis (x few days           Exposed to influenza \"A\" from 8mo old daughter) and Headache (cough, chills and nausiated)            Patient daughter was diagnosed with influenza A today  Patient start having body ache, and subjective fever  Also has had sore throat for the past week, cough has been waxing and waning and exacerbates with coughing      Pharyngitis    This is a new problem. The current episode started in the past 7 days. The problem has been waxing and waning. Neither side of throat is experiencing more pain than the other. The maximum temperature recorded prior to her arrival was 100.4 - 100.9 F. The pain is at a severity of 3/10. The pain is mild. Associated symptoms include congestion and coughing. Pertinent negatives include no abdominal pain, diarrhea, drooling, ear discharge, ear pain, headaches, hoarse voice, plugged ear sensation, neck pain, shortness of breath, stridor, swollen glands, trouble swallowing or vomiting. She has had no exposure to strep. Exposure to: FLU. She has tried nothing for the symptoms. The treatment provided no relief.       Review of Systems   Constitutional: Positive for chills, fever (subjective ) and malaise/fatigue. Negative for diaphoresis and weight loss.   HENT: Positive for congestion. Negative for drooling, ear discharge, ear pain, hearing loss, hoarse voice and trouble swallowing.    Eyes: Negative.    Respiratory: Positive for cough. Negative for shortness of breath and stridor.    Cardiovascular: Negative.    Gastrointestinal: Negative for abdominal pain, diarrhea and vomiting.   Musculoskeletal: Negative for neck pain.   Skin: Negative for rash.   Neurological: Negative for headaches.          Objective:     /82 (BP Location: Left arm, Patient Position: Sitting, BP Cuff Size: Large adult)   Pulse 90   Temp 36.7 °C (98 °F) (Temporal)   Resp 16   Ht 1.549 m (5' 1\")   Wt " 86.6 kg (191 lb)   SpO2 94%   BMI 36.09 kg/m²      Physical Exam  Constitutional:       General: She is not in acute distress.     Appearance: Normal appearance. She is normal weight. She is not ill-appearing, toxic-appearing or diaphoretic.   HENT:      Head: Normocephalic and atraumatic.      Right Ear: Tympanic membrane normal. There is no impacted cerumen.      Left Ear: Tympanic membrane normal. There is no impacted cerumen.      Nose: Nose normal. No congestion or rhinorrhea.      Mouth/Throat:      Mouth: Mucous membranes are moist.      Comments: Swollen bilateral tonsils, no exudate,  Eyes:      Pupils: Pupils are equal, round, and reactive to light.   Neck:      Musculoskeletal: Normal range of motion.   Cardiovascular:      Rate and Rhythm: Normal rate.   Pulmonary:      Effort: Pulmonary effort is normal. No respiratory distress.      Breath sounds: No stridor. No wheezing, rhonchi or rales.   Chest:      Chest wall: No tenderness.   Musculoskeletal: Normal range of motion.   Neurological:      Mental Status: She is alert.   Psychiatric:         Mood and Affect: Mood normal.                 Assessment/Plan:     1. Exposure to the flu  POCT Rapid Strep A    POCT Influenza A/B   2. Sore throat  POCT Rapid Strep A    POCT Influenza A/B   3. Runny nose  POCT Rapid Strep A    POCT Influenza A/B   4. Cough  POCT Rapid Strep A    POCT Influenza A/B       1. Sore throat    - POCT Rapid Strep A  - POCT Influenza A/B    2. Runny nose    - POCT Rapid Strep A  - POCT Influenza A/B    3. Cough    - POCT Rapid Strep A  - POCT Influenza A/B    Acute viral pharyngitis, patient POCT strep throat was negative  Patient Centor score is low, cough, no fever, no exudate  However patient has been exposed to influenza as her daughter was recently diagnosed  She is experiencing subjective fever, and malaise,  We will treat the patient with prophylactic influenza medication  She was encouraged to gargle salt and water, tea and  honey, and use Tylenol/NSAIDs for pain  Please come back to the clinic if your symptoms worsen or if you have any other concerns

## 2020-01-05 ENCOUNTER — OFFICE VISIT (OUTPATIENT)
Dept: URGENT CARE | Facility: CLINIC | Age: 30
End: 2020-01-05
Payer: COMMERCIAL

## 2020-01-05 VITALS
HEIGHT: 61 IN | DIASTOLIC BLOOD PRESSURE: 78 MMHG | BODY MASS INDEX: 37.08 KG/M2 | SYSTOLIC BLOOD PRESSURE: 124 MMHG | OXYGEN SATURATION: 97 % | RESPIRATION RATE: 14 BRPM | TEMPERATURE: 98.1 F | HEART RATE: 89 BPM | WEIGHT: 196.4 LBS

## 2020-01-05 DIAGNOSIS — J06.9 VIRAL URI WITH COUGH: ICD-10-CM

## 2020-01-05 LAB
FLUAV+FLUBV AG SPEC QL IA: NEGATIVE
INT CON NEG: NORMAL
INT CON POS: NORMAL

## 2020-01-05 PROCEDURE — 87804 INFLUENZA ASSAY W/OPTIC: CPT | Performed by: FAMILY MEDICINE

## 2020-01-05 PROCEDURE — 99213 OFFICE O/P EST LOW 20 MIN: CPT | Performed by: FAMILY MEDICINE

## 2020-01-05 ASSESSMENT — ENCOUNTER SYMPTOMS
VOMITING: 0
HEADACHES: 0
ABDOMINAL PAIN: 0
NAUSEA: 0
FEVER: 0
SHORTNESS OF BREATH: 0
SORE THROAT: 1
COUGH: 1
DIARRHEA: 0

## 2020-01-05 NOTE — PROGRESS NOTES
Subjective:     Sherrell Mukherjee is a 29 y.o. female who presents for Otalgia (x1 day, sore throat, runny nose, congestion, was seen for flu a couple weeks ago and given medication but she feels like it has come back and become stronger)    HPI  Pt presents for evaluation of a new problem   Pt ill since yesterday   Pt with sore throat, rhinorrhea, and ear pain on left side   Has a mild dry cough   Sore throat is slowly worsening  Had the flu 2 weeks ago and this feels similar  No exposures to the flu that she knows of  Multiple people in household with cough currently    Review of Systems   Constitutional: Negative for fever.   HENT: Positive for congestion and sore throat.    Respiratory: Positive for cough. Negative for shortness of breath.    Cardiovascular: Negative for chest pain.   Gastrointestinal: Negative for abdominal pain, diarrhea, nausea and vomiting.   Skin: Negative for rash.   Neurological: Negative for headaches.       PMH:  has a past medical history of Depression.  MEDS:   Current Outpatient Medications:   •  escitalopram (LEXAPRO) 10 MG Tab, Take 10 mg by mouth every day., Disp: , Rfl:   •  oseltamivir (TAMIFLU) 75 MG Cap, Take 1 Cap by mouth every day., Disp: 7 Cap, Rfl: 0  •  cyclobenzaprine (FLEXERIL) 10 MG Tab, Take 1 Tab by mouth 3 times a day as needed. (Patient not taking: Reported on 11/27/2018), Disp: 15 Tab, Rfl: 0  •  hydrocodone-acetaminophen (NORCO) 5-325 MG Tab per tablet, Take 1-2 Tabs by mouth every 6 hours as needed (Do not take within 6 hours of driving or working). (Patient not taking: Reported on 12/20/2019), Disp: 10 Tab, Rfl: 0  ALLERGIES: No Known Allergies  SURGHX:   Past Surgical History:   Procedure Laterality Date   • OTHER Right     myoectomy with Rt. cyst removed   • PRIMARY C SECTION       SOCHX:  reports that she has never smoked. She has never used smokeless tobacco. She reports that she does not drink alcohol or use drugs.  FH: Family history was reviewed,  "not contributing to acute illness     Objective:   /78 (Patient Position: Sitting)   Pulse 89   Temp 36.7 °C (98.1 °F) (Temporal)   Resp 14   Ht 1.549 m (5' 1\")   Wt 89.1 kg (196 lb 6.4 oz)   SpO2 97%   BMI 37.11 kg/m²     Physical Exam  Constitutional:       General: She is not in acute distress.     Appearance: She is well-developed. She is not diaphoretic.   HENT:      Head: Normocephalic and atraumatic.      Right Ear: Tympanic membrane, ear canal and external ear normal.      Left Ear: Tympanic membrane, ear canal and external ear normal.      Nose: Congestion and rhinorrhea present.      Mouth/Throat:      Mouth: Mucous membranes are moist.      Pharynx: Oropharynx is clear. No oropharyngeal exudate or posterior oropharyngeal erythema.   Eyes:      General: No scleral icterus.        Right eye: No discharge.         Left eye: No discharge.      Conjunctiva/sclera: Conjunctivae normal.   Neck:      Musculoskeletal: Normal range of motion.      Trachea: No tracheal deviation.   Cardiovascular:      Rate and Rhythm: Normal rate and regular rhythm.   Pulmonary:      Effort: Pulmonary effort is normal. No respiratory distress.      Breath sounds: Normal breath sounds. No wheezing or rales.   Skin:     General: Skin is warm and dry.      Findings: No rash.   Neurological:      Mental Status: She is alert and oriented to person, place, and time.   Psychiatric:         Mood and Affect: Mood normal.         Behavior: Behavior normal.         Thought Content: Thought content normal.         Judgment: Judgment normal.         Assessment/Plan:   Assessment    1. Viral URI with cough  - POCT Influenza A/B    Patient with viral URI.  Rapid influenza negative.  Reviewed supportive care measures and follow-up precautions.  Follow-up as needed.      "

## 2020-01-31 ENCOUNTER — OFFICE VISIT (OUTPATIENT)
Dept: URGENT CARE | Facility: PHYSICIAN GROUP | Age: 30
End: 2020-01-31
Payer: COMMERCIAL

## 2020-01-31 ENCOUNTER — HOSPITAL ENCOUNTER (OUTPATIENT)
Dept: RADIOLOGY | Facility: MEDICAL CENTER | Age: 30
End: 2020-01-31
Attending: NURSE PRACTITIONER
Payer: COMMERCIAL

## 2020-01-31 VITALS
OXYGEN SATURATION: 99 % | DIASTOLIC BLOOD PRESSURE: 72 MMHG | BODY MASS INDEX: 35.3 KG/M2 | HEIGHT: 61 IN | SYSTOLIC BLOOD PRESSURE: 108 MMHG | TEMPERATURE: 97.7 F | RESPIRATION RATE: 14 BRPM | WEIGHT: 187 LBS | HEART RATE: 85 BPM

## 2020-01-31 DIAGNOSIS — S89.91XA KNEE INJURY, RIGHT, INITIAL ENCOUNTER: ICD-10-CM

## 2020-01-31 DIAGNOSIS — M25.561 ACUTE PAIN OF RIGHT KNEE: ICD-10-CM

## 2020-01-31 DIAGNOSIS — S86.911A KNEE STRAIN, RIGHT, INITIAL ENCOUNTER: ICD-10-CM

## 2020-01-31 PROCEDURE — 99214 OFFICE O/P EST MOD 30 MIN: CPT | Performed by: NURSE PRACTITIONER

## 2020-01-31 PROCEDURE — 73564 X-RAY EXAM KNEE 4 OR MORE: CPT | Mod: RT

## 2020-01-31 RX ORDER — METHYLPREDNISOLONE 4 MG/1
TABLET ORAL
Qty: 21 TAB | Refills: 0 | Status: SHIPPED | OUTPATIENT
Start: 2020-01-31 | End: 2020-12-07

## 2020-01-31 ASSESSMENT — ENCOUNTER SYMPTOMS
BRUISES/BLEEDS EASILY: 0
WEAKNESS: 0
CHILLS: 0
SENSORY CHANGE: 0
FALLS: 0
FEVER: 0
MYALGIAS: 1
TINGLING: 0

## 2020-01-31 ASSESSMENT — PAIN SCALES - GENERAL: PAINLEVEL: 9=SEVERE PAIN

## 2020-01-31 NOTE — LETTER
January 31, 2020       Patient: Sherrell Mukherjee   YOB: 1990   Date of Visit: 1/31/2020         To Whom It May Concern:    It is my medical opinion that Sherrell Mukherjee must wear knee brace and use crutches while standing and walking during work hours. She will be re-evaluated in Sports Medicine for further work restrictions next week.    If you have any questions or concerns, please don't hesitate to call [phone]          Sincerely,          RENEE WillsPAmaliaRAmaliaN.  Electronically Signed

## 2020-02-01 NOTE — PROGRESS NOTES
Subjective:      Sherrell Mukherjee is a 29 y.o. female who presents with Knee Pain (pain after doing squats on sunday. right knee)            HPI  C/o right knee pain x 1 week. Has been going to the gym recently. States had been squatting and leg pressing 180#. Has pain at medial aspect of right knee joint. Advil 1x yesterday. No ice or leg elevation. Climbing stairs ans squatting increases pain. Wearing knee brace. Unable to weight bear without pain.     PMH:  has a past medical history of Depression.  MEDS:   Current Outpatient Medications:   •  escitalopram (LEXAPRO) 10 MG Tab, Take 10 mg by mouth every day., Disp: , Rfl:   •  oseltamivir (TAMIFLU) 75 MG Cap, Take 1 Cap by mouth every day. (Patient not taking: Reported on 1/31/2020), Disp: 7 Cap, Rfl: 0  •  cyclobenzaprine (FLEXERIL) 10 MG Tab, Take 1 Tab by mouth 3 times a day as needed. (Patient not taking: Reported on 11/27/2018), Disp: 15 Tab, Rfl: 0  •  hydrocodone-acetaminophen (NORCO) 5-325 MG Tab per tablet, Take 1-2 Tabs by mouth every 6 hours as needed (Do not take within 6 hours of driving or working). (Patient not taking: Reported on 12/20/2019), Disp: 10 Tab, Rfl: 0  ALLERGIES: No Known Allergies  SURGHX:   Past Surgical History:   Procedure Laterality Date   • OTHER Right     myoectomy with Rt. cyst removed   • PRIMARY C SECTION       SOCHX:  reports that she has never smoked. She has never used smokeless tobacco. She reports that she does not drink alcohol or use drugs.  FH: Family history was reviewed, no pertinent findings to report    Review of Systems   Constitutional: Negative for chills, fever and malaise/fatigue.   Cardiovascular: Negative for leg swelling.   Musculoskeletal: Positive for joint pain and myalgias. Negative for falls.   Skin: Negative for itching and rash.   Neurological: Negative for tingling, sensory change and weakness.   Endo/Heme/Allergies: Does not bruise/bleed easily.   All other systems reviewed and are  "negative.         Objective:     /72   Pulse 85   Temp 36.5 °C (97.7 °F)   Resp 14   Ht 1.549 m (5' 1\")   Wt 84.8 kg (187 lb)   SpO2 99%   BMI 35.33 kg/m²      Physical Exam  Vitals signs reviewed.   Constitutional:       General: She is awake. She is not in acute distress.     Appearance: Normal appearance. She is well-developed. She is not ill-appearing, toxic-appearing or diaphoretic.   HENT:      Head: Normocephalic.   Eyes:      Pupils: Pupils are equal, round, and reactive to light.   Cardiovascular:      Rate and Rhythm: Normal rate.   Pulmonary:      Effort: Pulmonary effort is normal.   Musculoskeletal:      Right knee: She exhibits decreased range of motion and swelling. She exhibits no effusion, no ecchymosis, no deformity, no laceration, no erythema, normal alignment, no LCL laxity, normal patellar mobility, no bony tenderness, normal meniscus and no MCL laxity. Tenderness found. Medial joint line and patellar tendon tenderness noted.        Legs:       Comments: Unable to extend or flex right knee joint, no bruising, no redness, no heat to skin. Antalgic gait.   Skin:     General: Skin is warm and dry.      Findings: No erythema or rash.   Neurological:      Mental Status: She is alert and oriented to person, place, and time.   Psychiatric:         Behavior: Behavior is cooperative.                 Assessment/Plan:       1. Acute pain of right knee    - DX-KNEE COMPLETE 4+ RIGHT; Future  - REFERRAL TO SPORTS MEDICINE  - methylPREDNISolone (MEDROL DOSEPAK) 4 MG Tablet Therapy Pack; Follow schedule on package instructions.  Dispense: 21 Tab; Refill: 0    2. Knee injury, right, initial encounter    - DX-KNEE COMPLETE 4+ RIGHT; Future  - REFERRAL TO SPORTS MEDICINE    3. Knee strain, right, initial encounter    - REFERRAL TO SPORTS MEDICINE  - methylPREDNISolone (MEDROL DOSEPAK) 4 MG Tablet Therapy Pack; Follow schedule on package instructions.  Dispense: 21 Tab; Refill: 0    May use NSAID prn " for pain/swelling  May use cool compresses for swelling prn  May utilize RICE method prn  Avoid excessive weight bearing to avoid further injury  May apply topical analgesics prn  Perform proper body mechanics with lifting, twisting, bending and walking  Monitor for deformity, numbness/tingling in toes, decreased ROM with weight bearing- need re-evaluation  F/u Sports Med prn  Work note with restrictions

## 2020-02-07 ENCOUNTER — OFFICE VISIT (OUTPATIENT)
Dept: MEDICAL GROUP | Facility: CLINIC | Age: 30
End: 2020-02-07
Payer: COMMERCIAL

## 2020-02-07 VITALS
HEART RATE: 72 BPM | HEIGHT: 61 IN | WEIGHT: 187 LBS | BODY MASS INDEX: 35.3 KG/M2 | SYSTOLIC BLOOD PRESSURE: 118 MMHG | TEMPERATURE: 98.1 F | RESPIRATION RATE: 16 BRPM | OXYGEN SATURATION: 98 % | DIASTOLIC BLOOD PRESSURE: 78 MMHG

## 2020-02-07 DIAGNOSIS — M62.89 HAMSTRING TIGHTNESS: ICD-10-CM

## 2020-02-07 DIAGNOSIS — M70.50 PES ANSERINE BURSITIS: ICD-10-CM

## 2020-02-07 DIAGNOSIS — M22.2X1 PATELLOFEMORAL PAIN SYNDROME OF RIGHT KNEE: ICD-10-CM

## 2020-02-07 PROCEDURE — 99213 OFFICE O/P EST LOW 20 MIN: CPT | Performed by: FAMILY MEDICINE

## 2020-02-07 NOTE — PROGRESS NOTES
CHIEF COMPLAINT:  Sherrell Mukherjee female presenting at the request of TROY Sepulveda for evaluation of knee pain.     Sherrell Mukherjee is complaining of right knee pain  Date of onset, 2/1/20  Squatting with dumbbell, leg extension machine caused her pain  Pain is at the anteromedial knee  Quality is aching, burning.  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 No pertinent past medical history.    PMH:  has a past medical history of Depression.  MEDS:   Current Outpatient Medications:   •  methylPREDNISolone (MEDROL DOSEPAK) 4 MG Tablet Therapy Pack, Follow schedule on package instructions. (Patient not taking: Reported on 2/7/2020), Disp: 21 Tab, Rfl: 0  •  escitalopram (LEXAPRO) 10 MG Tab, Take 10 mg by mouth every day., Disp: , Rfl:   •  oseltamivir (TAMIFLU) 75 MG Cap, Take 1 Cap by mouth every day. (Patient not taking: Reported on 1/31/2020), Disp: 7 Cap, Rfl: 0  •  cyclobenzaprine (FLEXERIL) 10 MG Tab, Take 1 Tab by mouth 3 times a day as needed. (Patient not taking: Reported on 11/27/2018), Disp: 15 Tab, Rfl: 0  •  hydrocodone-acetaminophen (NORCO) 5-325 MG Tab per tablet, Take 1-2 Tabs by mouth every 6 hours as needed (Do not take within 6 hours of driving or working). (Patient not taking: Reported on 12/20/2019), Disp: 10 Tab, Rfl: 0  ALLERGIES: No Known  "Allergies  SURGHX:   Past Surgical History:   Procedure Laterality Date   • MYOMECTOMY     • OTHER Right     myoectomy with Rt. cyst removed   • OVARIAN CYSTECTOMY Right    • PRIMARY C SECTION       SOCHX:  reports that she has never smoked. She has never used smokeless tobacco. She reports that she does not drink alcohol or use drugs.  FH: Family history was reviewed, no pertinent findings to report     PHYSICAL EXAM:  /78 (BP Location: Left arm, Patient Position: Sitting, BP Cuff Size: Adult)   Pulse 72   Temp 36.7 °C (98.1 °F) (Temporal)   Resp 16   Ht 1.549 m (5' 1\")   Wt 84.8 kg (187 lb)   SpO2 98%   BMI 35.33 kg/m²      obese in no apparent distress, alert and oriented x 3.  Gait: normal      RIGHT Knee:  Slight Varus and No Swelling  Range of Motion Intact  Trace effusion  Patellar Apprehension and No tenderness and no apprehension  Medial Joint Line Tenderness and NEGATIVE Sonya  Lateral Joint Line Non-tender and NEGATIVE Sonya  Trace Laxity with Varus stress  Trace Laxity with Valgus stress  Lachman's testing is Trace  Posterior Drawer Testing is Trace  The leg is otherwise neurovascularly intact    LEFT Knee:  Slight Varus and No Swelling   Range of Motion Intact  Trace effusion   Patellar No tenderness and no apprehension  Medial Joint Line Tenderness and NEGATIVE Sonya  Lateral Joint Line Non-tender and NEGATIVE Sonya  Trace Laxity with Varus stress  Trace Laxity with Valgus stress  Lachman's testing is Trace  Posterior Drawer Testing is Trace  The leg is otherwise neurovascularly intact    Additional Findings: Tight hamstrings and poor single-leg squat mechanics, worse on the RIGHT compared to left    1. Patellofemoral pain syndrome of right knee  REFERRAL TO PHYSICAL THERAPY Reason for Therapy: Eval/Treat/Report   2. Hamstring tightness  REFERRAL TO PHYSICAL THERAPY Reason for Therapy: Eval/Treat/Report   3. Pes anserine bursitis  REFERRAL TO PHYSICAL THERAPY Reason for " Therapy: Eval/Treat/Report     Referral for physical therapy ([facility])  Patellar stabilizer brace for the RIGHT knee.  She received a brace at urgent care which she will bring back to us since it is not well fitting  Will provide her with some home exercises    Return in about 4 weeks (around 3/6/2020).   To see how she is doing with formal physical therapy, home exercise program and patellar brace         1/31/2020 6:15 PM     HISTORY/REASON FOR EXAM:  squatting, leg pressing; Pain/Deformity Following Trauma.  Right medial knee pain     TECHNIQUE/EXAM DESCRIPTION AND NUMBER OF VIEWS:  4 views of the RIGHT knee.     COMPARISON: None     FINDINGS:  There is no fracture.     Alignment is normal.     No degenerative changes are present.     IMPRESSION:     Negative right knee series    done elsewhere and reviewed independently by me    Thank you JEAN-PIERRE Sepulveda. for allowing me to participate in caring for your patient.

## 2020-03-23 ENCOUNTER — APPOINTMENT (OUTPATIENT)
Dept: PHYSICAL THERAPY | Facility: REHABILITATION | Age: 30
End: 2020-03-23
Attending: FAMILY MEDICINE
Payer: COMMERCIAL

## 2020-03-30 ENCOUNTER — APPOINTMENT (OUTPATIENT)
Dept: PHYSICAL THERAPY | Facility: REHABILITATION | Age: 30
End: 2020-03-30
Attending: FAMILY MEDICINE
Payer: COMMERCIAL

## 2020-04-01 ENCOUNTER — OFFICE VISIT (OUTPATIENT)
Dept: URGENT CARE | Facility: PHYSICIAN GROUP | Age: 30
End: 2020-04-01
Payer: COMMERCIAL

## 2020-04-01 VITALS
DIASTOLIC BLOOD PRESSURE: 70 MMHG | BODY MASS INDEX: 35.5 KG/M2 | WEIGHT: 188 LBS | HEART RATE: 94 BPM | RESPIRATION RATE: 14 BRPM | OXYGEN SATURATION: 99 % | SYSTOLIC BLOOD PRESSURE: 110 MMHG | HEIGHT: 61 IN | TEMPERATURE: 97.6 F

## 2020-04-01 DIAGNOSIS — H10.89 OTHER CONJUNCTIVITIS OF RIGHT EYE: ICD-10-CM

## 2020-04-01 PROCEDURE — 99214 OFFICE O/P EST MOD 30 MIN: CPT | Performed by: PHYSICIAN ASSISTANT

## 2020-04-01 RX ORDER — POLYMYXIN B SULFATE AND TRIMETHOPRIM 1; 10000 MG/ML; [USP'U]/ML
1 SOLUTION OPHTHALMIC EVERY 4 HOURS
Qty: 1 BOTTLE | Refills: 0 | Status: SHIPPED | OUTPATIENT
Start: 2020-04-01 | End: 2020-04-11

## 2020-04-01 ASSESSMENT — ENCOUNTER SYMPTOMS
SORE THROAT: 0
PALPITATIONS: 0
EYE PAIN: 1
PHOTOPHOBIA: 0
CHILLS: 0
HEADACHES: 0
FEVER: 0
COUGH: 0
SHORTNESS OF BREATH: 0
DOUBLE VISION: 0
EYE DISCHARGE: 1
SINUS PAIN: 0
EYE REDNESS: 1
BLURRED VISION: 0

## 2020-04-01 NOTE — PROGRESS NOTES
Subjective:      Sherrell Mukherjee is a 29 y.o. female who presents with No chief complaint on file.            Conjunctivitis   This is a new problem. The current episode started in the past 7 days. The problem occurs constantly. Pertinent negatives include no chest pain, chills, congestion, coughing, fever, headaches, rash or sore throat. Nothing aggravates the symptoms. She has tried nothing for the symptoms.       Review of Systems   Constitutional: Negative for chills, fever and malaise/fatigue.   HENT: Negative for congestion, ear pain, sinus pain and sore throat.    Eyes: Positive for pain, discharge and redness. Negative for blurred vision, double vision and photophobia.   Respiratory: Negative for cough and shortness of breath.    Cardiovascular: Negative for chest pain and palpitations.   Skin: Negative for rash.   Neurological: Negative for headaches.   All other systems reviewed and are negative.    PMH:  has a past medical history of Depression.  MEDS:   Current Outpatient Medications:   •  methylPREDNISolone (MEDROL DOSEPAK) 4 MG Tablet Therapy Pack, Follow schedule on package instructions. (Patient not taking: Reported on 2/7/2020), Disp: 21 Tab, Rfl: 0  •  escitalopram (LEXAPRO) 10 MG Tab, Take 10 mg by mouth every day., Disp: , Rfl:   •  oseltamivir (TAMIFLU) 75 MG Cap, Take 1 Cap by mouth every day. (Patient not taking: Reported on 1/31/2020), Disp: 7 Cap, Rfl: 0  •  cyclobenzaprine (FLEXERIL) 10 MG Tab, Take 1 Tab by mouth 3 times a day as needed. (Patient not taking: Reported on 11/27/2018), Disp: 15 Tab, Rfl: 0  •  hydrocodone-acetaminophen (NORCO) 5-325 MG Tab per tablet, Take 1-2 Tabs by mouth every 6 hours as needed (Do not take within 6 hours of driving or working). (Patient not taking: Reported on 12/20/2019), Disp: 10 Tab, Rfl: 0  ALLERGIES: No Known Allergies  SURGHX:   Past Surgical History:   Procedure Laterality Date   • MYOMECTOMY     • OTHER Right     myoectomy with Rt. cyst  removed   • OVARIAN CYSTECTOMY Right    • PRIMARY C SECTION       SOCHX:  reports that she has never smoked. She has never used smokeless tobacco. She reports that she does not drink alcohol or use drugs.  FH: Family history was reviewed, no pertinent findings to report  Medications, Allergies, and current problem list reviewed today in Epic       Objective:     There were no vitals taken for this visit.     Physical Exam  Vitals signs reviewed.   Constitutional:       General: She is not in acute distress.     Appearance: She is well-developed. She is not ill-appearing or toxic-appearing.   HENT:      Head: Normocephalic and atraumatic.      Right Ear: Hearing, tympanic membrane, ear canal and external ear normal.      Left Ear: Hearing, tympanic membrane, ear canal and external ear normal.      Nose: Nose normal.      Mouth/Throat:      Pharynx: Uvula midline. No oropharyngeal exudate.   Eyes:      General: Lids are normal. Lids are everted, no foreign bodies appreciated.      Conjunctiva/sclera:      Right eye: Right conjunctiva is injected. Exudate present.      Pupils: Pupils are equal, round, and reactive to light.   Neck:      Musculoskeletal: Full passive range of motion without pain, normal range of motion and neck supple.   Cardiovascular:      Rate and Rhythm: Regular rhythm.      Heart sounds: Normal heart sounds. No murmur. No friction rub. No gallop.    Pulmonary:      Effort: Pulmonary effort is normal. No respiratory distress.      Breath sounds: Normal breath sounds. No decreased breath sounds, wheezing or rales.   Chest:      Chest wall: No tenderness.   Musculoskeletal: Normal range of motion.         General: No tenderness or deformity.   Skin:     General: Skin is warm and dry.      Findings: No rash.   Neurological:      Mental Status: She is alert and oriented to person, place, and time.   Psychiatric:         Speech: Speech normal.         Behavior: Behavior normal.         Thought Content:  Thought content normal.         Judgment: Judgment normal.                 Assessment/Plan:     1. Other conjunctivitis of right eye  polymixin-trimethoprim (POLYTRIM) 83562-1.1 UNIT/ML-% Solution       Differential diagnosis, natural history, supportive care discussed. Follow-up with primary care provider within 7-10 days, emergency room precautions discussed.  Patient and/or family appears understanding of information.  Handout and review of patients diagnosis and treatment was discussed extensively.

## 2020-04-06 ENCOUNTER — APPOINTMENT (OUTPATIENT)
Dept: PHYSICAL THERAPY | Facility: REHABILITATION | Age: 30
End: 2020-04-06
Attending: FAMILY MEDICINE
Payer: COMMERCIAL

## 2020-04-13 ENCOUNTER — APPOINTMENT (OUTPATIENT)
Dept: PHYSICAL THERAPY | Facility: REHABILITATION | Age: 30
End: 2020-04-13
Attending: FAMILY MEDICINE
Payer: COMMERCIAL

## 2020-08-24 ENCOUNTER — NON-PROVIDER VISIT (OUTPATIENT)
Dept: OCCUPATIONAL MEDICINE | Facility: CLINIC | Age: 30
End: 2020-08-24
Payer: OTHER MISCELLANEOUS

## 2020-08-24 DIAGNOSIS — Z02.89 VISIT FOR OCCUPATIONAL HEALTH EXAMINATION: Primary | ICD-10-CM

## 2020-08-24 LAB
AMP AMPHETAMINE: NORMAL
COC COCAINE: NORMAL
INT CON NEG: NORMAL
INT CON POS: NORMAL
MET METHAMPHETAMINES: NORMAL
OPI OPIATES: NORMAL
PCP PHENCYCLIDINE: NORMAL
POC DRUG COMMENT 753798-OCCUPATIONAL HEALTH: NORMAL
THC: NORMAL

## 2020-08-24 PROCEDURE — 80305 DRUG TEST PRSMV DIR OPT OBS: CPT | Performed by: PREVENTIVE MEDICINE

## 2020-11-07 ENCOUNTER — OFFICE VISIT (OUTPATIENT)
Dept: URGENT CARE | Facility: PHYSICIAN GROUP | Age: 30
End: 2020-11-07
Payer: COMMERCIAL

## 2020-11-07 VITALS
DIASTOLIC BLOOD PRESSURE: 80 MMHG | BODY MASS INDEX: 34.93 KG/M2 | RESPIRATION RATE: 16 BRPM | HEART RATE: 78 BPM | OXYGEN SATURATION: 97 % | SYSTOLIC BLOOD PRESSURE: 136 MMHG | WEIGHT: 185 LBS | TEMPERATURE: 97.9 F | HEIGHT: 61 IN

## 2020-11-07 DIAGNOSIS — H01.001 BLEPHARITIS OF RIGHT UPPER EYELID, UNSPECIFIED TYPE: ICD-10-CM

## 2020-11-07 PROCEDURE — 99202 OFFICE O/P NEW SF 15 MIN: CPT | Performed by: EMERGENCY MEDICINE

## 2020-11-07 RX ORDER — ERYTHROMYCIN 5 MG/G
1 OINTMENT OPHTHALMIC 3 TIMES DAILY
Qty: 1 G | Refills: 0 | Status: SHIPPED | OUTPATIENT
Start: 2020-11-07 | End: 2020-12-07

## 2020-11-07 ASSESSMENT — ENCOUNTER SYMPTOMS
EYE ITCHING: 1
FEVER: 0
FOREIGN BODY SENSATION: 0
PHOTOPHOBIA: 0
EYE REDNESS: 1
EYE DISCHARGE: 1
BLURRED VISION: 0

## 2020-11-07 ASSESSMENT — PAIN SCALES - GENERAL: PAINLEVEL: NO PAIN

## 2020-11-07 NOTE — PROGRESS NOTES
"Subjective:      Sherrell Mukherjee is a 30 y.o. female who presents with Eye Problem (right eye lid swelling and redness x 3 weeks, drainage.)            Eye Problem   The right eye is affected. This is a new problem. Episode onset: 3 weeks. The problem occurs daily. There was no injury mechanism. There is no known exposure to pink eye. She does not wear contacts. Associated symptoms include an eye discharge, eye redness and itching. Pertinent negatives include no blurred vision, fever, foreign body sensation, photophobia or recent URI. Treatments tried: Benadryl.       Review of Systems   Constitutional: Negative for fever.   Eyes: Positive for discharge, redness and itching. Negative for blurred vision and photophobia.   Skin: Positive for itching.   Endo/Heme/Allergies: Negative for environmental allergies.       Past Medical History:   Diagnosis Date   • Depression      Past Surgical History:   Procedure Laterality Date   • MYOMECTOMY     • OTHER Right     myoectomy with Rt. cyst removed   • OVARIAN CYSTECTOMY Right    • PRIMARY C SECTION        Allergy:  Patient has no known allergies.     Current Outpatient Medications:   •  erythromycin, 1 cm, Right Eye, TID  •  escitalopram, 10 mg, Oral, DAILY, Taking  •  methylPREDNISolone, Follow schedule on package instructions., Not Taking  •  oseltamivir, 75 mg, Oral, DAILY (Patient not taking: Reported on 1/31/2020), Not Taking  •  cyclobenzaprine, 10 mg, Oral, TID PRN (Patient not taking: Reported on 11/27/2018), Not Taking  •  HYDROcodone-acetaminophen, 1-2 Tab, Oral, Q6HRS PRN (Patient not taking: Reported on 12/20/2019), Not Taking   family history is not on file.   Social History     Tobacco Use   • Smoking status: Never Smoker   • Smokeless tobacco: Never Used   Substance Use Topics   • Alcohol use: No   • Drug use: No       Objective:     /80   Pulse 78   Temp 36.6 °C (97.9 °F)   Resp 16   Ht 1.549 m (5' 1\")   Wt 83.9 kg (185 lb)   SpO2 97%   " BMI 34.96 kg/m²      Physical Exam  Constitutional:       General: She is awake. She is not in acute distress.     Appearance: Normal appearance. She is well-developed.   HENT:      Head: Normocephalic and atraumatic.   Eyes:      General:         Right eye: No foreign body, discharge or hordeolum.      Extraocular Movements: Extraocular movements intact.      Conjunctiva/sclera:      Right eye: Right conjunctiva is injected. No chemosis, exudate or hemorrhage.     Left eye: Left conjunctiva is not injected. No chemosis, exudate or hemorrhage.     Pupils: Pupils are equal, round, and reactive to light.     Pulmonary:      Effort: Pulmonary effort is normal.   Lymphadenopathy:      Head:      Right side of head: No preauricular adenopathy.      Left side of head: No preauricular adenopathy.   Skin:     Findings: No rash or wound.   Neurological:      Mental Status: She is alert.   Psychiatric:         Behavior: Behavior is cooperative.                 Assessment/Plan:        1. Blepharitis of right upper eyelid, unspecified type  OTC antihistamine oral/ocular anti-inflammatory.  - erythromycin 5 MG/GM Ointment; Place 1 cm in right eye 3 times a day.  Dispense: 1 g; Refill: 0

## 2020-11-07 NOTE — PATIENT INSTRUCTIONS
Use over-the-counter levocetirizine (Xyxal), cetirizine (Zyrtec), fexofenadine (Allegra), or loratadine (Claritin) as needed for relief of itch symptoms.  Ketotifen eye solution  What is this medicine?  KETOTIFEN (gerry toe XIAO fen) eye drops are used in the eye to prevent itching that is caused by allergies.  This medicine may be used for other purposes; ask your health care provider or pharmacist if you have questions.  COMMON BRAND NAME(S): Alaway, Children's Alaway, Claritin Eye, Eye Itch Relief, Itchy Eye, Zaditor, Zyrtec Itchy Eye  What should I tell my health care provider before I take this medicine?  They need to know if you have any of these conditions:  · wear contact lenses  · an unusual or allergic reaction to ketotifen, other medicines, foods, dyes, or preservatives  · pregnant or trying to get pregnant  · breast-feeding  How should I use this medicine?  This medicine is only for use in the eye. Do not take by mouth. Follow the directions on the prescription label. Wash hands before and after use. Tilt the head back slightly and pull down the lower eyelid with the index finger to form a pouch. Try not to touch the tip of the dropper to your eye, fingertips, or any other surface. Squeeze the prescribed number of drops into the pouch. Close the eye gently to spread the drops. Your vision may blur for a few minutes. Use your doses at regular intervals. Do not use your medicine more often than directed. If you use other eye medicines, they should be used at least 10 minutes before or after this medicine. Eye ointments should be applied last.  Talk to your pediatrician regarding the use of this medicine in children. Special care may be needed. While this drug may be prescribed for children as young as 3 years of age for selected conditions, precautions do apply.  Overdosage: If you think you have taken too much of this medicine contact a poison control center or emergency room at once.  NOTE: This medicine is  only for you. Do not share this medicine with others.  What if I miss a dose?  If you miss a dose, use it as soon as you can. If it is almost time for your next dose, use only that dose. Do not use double or extra doses.  What may interact with this medicine?  Interactions are not expected. Do not use any other eye products without telling your doctor or health care professional.  This list may not describe all possible interactions. Give your health care provider a list of all the medicines, herbs, non-prescription drugs, or dietary supplements you use. Also tell them if you smoke, drink alcohol, or use illegal drugs. Some items may interact with your medicine.  What should I watch for while using this medicine?  Tell your doctor or health care professional if your symptoms do not start to improve in 2 or 3 days.  Report any serious side effects promptly. Stop using this medicine if your eyes get swollen, painful, or have a discharge, and see your doctor or health care professional as soon as you can.  Contact lenses may be inserted 10 minutes after putting the medicine in the eye. Do not wear contact lenses if your eyes are red. You should not use this medicine to treat irritation that is caused by contact lenses.  What side effects may I notice from receiving this medicine?  Side effects that you should report to your doctor or health care professional as soon as possible:  · skin rash, hives, or itching  · swelling of the lips, tongue or face  Side effects that usually do not require medical attention (report to your doctor or health care professional if they continue or are bothersome):  · burning, discomfort, stinging, or tearing immediately after use  · eyelid swelling or eye dryness  · headache  · sensitivity of the eyes to sunlight  This list may not describe all possible side effects. Call your doctor for medical advice about side effects. You may report side effects to FDA at 0-302-FDA-4369.  Where should  I keep my medicine?  Keep out of the reach of children.  Store between 4 and 25 degrees C (39 and 77 degrees F). Do not freeze. Protect from light. Throw away any unused medicine after the expiration date.  NOTE: This sheet is a summary. It may not cover all possible information. If you have questions about this medicine, talk to your doctor, pharmacist, or health care provider.  © 2020 Elsevier/Gold Standard (2009-06-19 14:36:34)    Blepharitis  Blepharitis is swelling of the eyelids. Symptoms may include:  · Reddish, scaly skin around the scalp and eyebrows.  · Burning or itching of the eyelids.  · Fluid coming from the eye at night. This causes the eyelashes to stick together in the morning.  · Eyelashes that fall out.  · Being sensitive to light.  Follow these instructions at home:  Pay attention to any changes in how you look or feel. Tell your health care provider about any changes. Follow these instructions to help with your condition:  Keeping clean    · Wash your hands often.  · Wash your eyelids with warm water, or wash them with warm water that is mixed with little bit of baby shampoo. Do this 2 or more times per day.  · Wash your face and eyebrows at least once a day.  · Use a clean towel each time you dry your eyelids. Do not use the towel to clean or dry other areas of your body. Do not share your towel with anyone.  General instructions  · Avoid wearing makeup until you get better. Do not share makeup with anyone.  · Avoid rubbing your eyes.  · Put a warm compress on your eyes 2 times per day for 10 minutes at a time, or as told by your doctor.  · If you were given antibiotics in the form of creams or eye drops, use the medicine as told by your doctor. Do not stop using the medicine even if you feel better.  · Keep all follow-up visits as told by your doctor. This is important.  Contact a doctor if:  · Your eyelids feel hot.  · You have blisters on your eyelids.  · You have a rash on your  eyelids.  · The swelling does not go away in 2-4 days.  · The swelling gets worse.  Get help right away if:  · You have pain that gets worse.  · You have pain that spreads to other parts of your face.  · You have redness that gets worse.  · You have redness that spreads to other parts of your face.  · Your vision changes.  · You have pain when you look at lights or things that move.  · You have a fever.  Summary  · Blepharitis is swelling of the eyelids.  · Pay attention to any changes in how your eyes look or feel. Tell your doctor about any changes.  · Follow home care instructions as told by your doctor. Wash your hands often. Avoid wearing makeup. Do not rub your eyes.  · Use warm compresses, creams, or eye drops as told by your doctor.  · Let your doctor know if you have changes in vision, blisters or rash on eyelids, pain that spreads to your face, or warmth on your eyelids.  This information is not intended to replace advice given to you by your health care provider. Make sure you discuss any questions you have with your health care provider.  Document Released: 09/26/2009 Document Revised: 06/17/2019 Document Reviewed: 06/17/2019  Elsevier Patient Education © 2020 [employer]

## 2020-12-07 ENCOUNTER — HOSPITAL ENCOUNTER (OUTPATIENT)
Facility: MEDICAL CENTER | Age: 30
End: 2020-12-07
Attending: PHYSICIAN ASSISTANT
Payer: COMMERCIAL

## 2020-12-07 ENCOUNTER — OFFICE VISIT (OUTPATIENT)
Dept: URGENT CARE | Facility: PHYSICIAN GROUP | Age: 30
End: 2020-12-07
Payer: COMMERCIAL

## 2020-12-07 VITALS
SYSTOLIC BLOOD PRESSURE: 120 MMHG | DIASTOLIC BLOOD PRESSURE: 82 MMHG | OXYGEN SATURATION: 97 % | RESPIRATION RATE: 16 BRPM | WEIGHT: 184 LBS | HEIGHT: 61 IN | BODY MASS INDEX: 34.74 KG/M2 | TEMPERATURE: 98.4 F | HEART RATE: 112 BPM

## 2020-12-07 DIAGNOSIS — J02.9 SORE THROAT: ICD-10-CM

## 2020-12-07 DIAGNOSIS — R05.9 COUGH: ICD-10-CM

## 2020-12-07 DIAGNOSIS — R52 GENERALIZED BODY ACHES: ICD-10-CM

## 2020-12-07 LAB
INT CON NEG: NORMAL
INT CON POS: NORMAL
S PYO AG THROAT QL: NEGATIVE

## 2020-12-07 PROCEDURE — U0003 INFECTIOUS AGENT DETECTION BY NUCLEIC ACID (DNA OR RNA); SEVERE ACUTE RESPIRATORY SYNDROME CORONAVIRUS 2 (SARS-COV-2) (CORONAVIRUS DISEASE [COVID-19]), AMPLIFIED PROBE TECHNIQUE, MAKING USE OF HIGH THROUGHPUT TECHNOLOGIES AS DESCRIBED BY CMS-2020-01-R: HCPCS

## 2020-12-07 PROCEDURE — 99214 OFFICE O/P EST MOD 30 MIN: CPT | Performed by: PHYSICIAN ASSISTANT

## 2020-12-07 PROCEDURE — 87880 STREP A ASSAY W/OPTIC: CPT | Performed by: PHYSICIAN ASSISTANT

## 2020-12-07 ASSESSMENT — ENCOUNTER SYMPTOMS
CHILLS: 1
SHORTNESS OF BREATH: 0
ABDOMINAL PAIN: 0
MYALGIAS: 1
PALPITATIONS: 0
SORE THROAT: 1
NAUSEA: 0
VOMITING: 1
FEVER: 0
WHEEZING: 0
COUGH: 1

## 2020-12-07 NOTE — PROGRESS NOTES
Subjective:   Sherrell Mukherjee is a 30 y.o. female who presents today with   Chief Complaint   Patient presents with   • Pharyngitis     L ear pain, congestion, [id number]        Pharyngitis   This is a new problem. Episode onset: 2 days. The problem has been gradually worsening. There has been no fever. The pain is mild. Associated symptoms include congestion, coughing and vomiting. Pertinent negatives include no abdominal pain or shortness of breath. Associated symptoms comments: Feverish, L ear pain. She has tried acetaminophen for the symptoms. The treatment provided mild relief.     I personally donned proper PPE throughout visit today.   No known COVID exposure.  PMH:  has a past medical history of Depression.  MEDS:   Current Outpatient Medications:   •  norgestrel-ethinyl estradiol (LO-OVRAL) 0.3-30 MG-MCG Tab, Take 1 Tab by mouth every day., Disp: , Rfl:   •  escitalopram (LEXAPRO) 10 MG Tab, Take 10 mg by mouth every day., Disp: , Rfl:   ALLERGIES: No Known Allergies  SURGHX:   Past Surgical History:   Procedure Laterality Date   • MYOMECTOMY     • OTHER Right     myoectomy with Rt. cyst removed   • OVARIAN CYSTECTOMY Right    • PRIMARY C SECTION       SOCHX:  reports that she has never smoked. She has never used smokeless tobacco. She reports that she does not drink alcohol or use drugs.  FH: Reviewed with patient, not pertinent to this visit.       Review of Systems   Constitutional: Positive for chills and malaise/fatigue. Negative for fever.   HENT: Positive for congestion and sore throat.    Respiratory: Positive for cough. Negative for shortness of breath and wheezing.    Cardiovascular: Negative for chest pain and palpitations.   Gastrointestinal: Positive for vomiting. Negative for abdominal pain and nausea.   Musculoskeletal: Positive for myalgias.   All other systems reviewed and are negative.       Objective:   /82   Pulse (!) 112   Temp 36.9 °C (98.4 °F) (Temporal)   Resp 16    "Ht 1.549 m (5' 1\")   Wt 83.5 kg (184 lb)   SpO2 97%   BMI 34.77 kg/m²   Physical Exam  Vitals signs and nursing note reviewed.   Constitutional:       General: She is not in acute distress.     Appearance: Normal appearance. She is well-developed. She is not ill-appearing, toxic-appearing or diaphoretic.   HENT:      Head: Normocephalic and atraumatic.      Right Ear: Hearing normal.      Left Ear: Hearing normal.      Nose: Congestion present.      Mouth/Throat:      Mouth: Mucous membranes are moist.      Pharynx: Uvula midline. Posterior oropharyngeal erythema present. No uvula swelling.      Comments: Left sided tonsillar exudate  Eyes:      Conjunctiva/sclera: Conjunctivae normal.   Cardiovascular:      Rate and Rhythm: Normal rate and regular rhythm.      Heart sounds: Normal heart sounds.   Pulmonary:      Effort: Pulmonary effort is normal.      Breath sounds: Normal breath sounds.   Musculoskeletal:      Comments: Normal movement in all 4 extremities   Lymphadenopathy:      Cervical: No cervical adenopathy.   Skin:     General: Skin is warm and dry.   Neurological:      Mental Status: She is alert.      Coordination: Coordination normal.   Psychiatric:         Mood and Affect: Mood normal.         STREP A NEG  Assessment/Plan:   Assessment    1. Sore throat  - POCT Rapid Strep A    2. Cough  - COVID/SARS CoV-2 PCR; Future    3. Generalized body aches  - COVID/SARS CoV-2 PCR; Future    Other orders  - norgestrel-ethinyl estradiol (LO-OVRAL) 0.3-30 MG-MCG Tab; Take 1 Tab by mouth every day.  Discussed CDC guidelines including self isolation at home.   Patient encouraged to get plenty of rest, use OTC tylenol for pain/fever, and drink plenty of fluids.  Patient did have one episode of emesis during exam and felt feverish but felt better after vomiting.  She denies any further nausea.    Differential diagnosis, natural history, supportive care, and indications for immediate follow-up discussed.   Patient " given instructions and understanding of medications and treatment.    If not improving in 3-5 days, F/U with PCP or return to UC if symptoms worsen.    Patient agreeable to plan.      Please note that this dictation was created using voice recognition software. I have made every reasonable attempt to correct obvious errors, but I expect that there are errors of grammar and possibly content that I did not discover before finalizing the note.    Chase Perez PA-C

## 2020-12-08 LAB
COVID ORDER STATUS COVID19: NORMAL
SARS-COV-2 RNA RESP QL NAA+PROBE: NOTDETECTED
SPECIMEN SOURCE: NORMAL

## 2021-03-27 ENCOUNTER — HOSPITAL ENCOUNTER (OUTPATIENT)
Dept: LAB | Facility: MEDICAL CENTER | Age: 31
End: 2021-03-27
Attending: FAMILY MEDICINE
Payer: OTHER MISCELLANEOUS

## 2021-03-27 LAB
ALBUMIN SERPL BCP-MCNC: 4 G/DL (ref 3.2–4.9)
ALBUMIN/GLOB SERPL: 1.1 G/DL
ALP SERPL-CCNC: 72 U/L (ref 30–99)
ALT SERPL-CCNC: 21 U/L (ref 2–50)
ANION GAP SERPL CALC-SCNC: 10 MMOL/L (ref 7–16)
AST SERPL-CCNC: 32 U/L (ref 12–45)
BASOPHILS # BLD AUTO: 0.5 % (ref 0–1.8)
BASOPHILS # BLD: 0.03 K/UL (ref 0–0.12)
BILIRUB SERPL-MCNC: 0.2 MG/DL (ref 0.1–1.5)
BUN SERPL-MCNC: 8 MG/DL (ref 8–22)
CALCIUM SERPL-MCNC: 9 MG/DL (ref 8.5–10.5)
CHLORIDE SERPL-SCNC: 108 MMOL/L (ref 96–112)
CHOLEST SERPL-MCNC: 152 MG/DL (ref 100–199)
CO2 SERPL-SCNC: 23 MMOL/L (ref 20–33)
CREAT SERPL-MCNC: 0.62 MG/DL (ref 0.5–1.4)
EOSINOPHIL # BLD AUTO: 0.18 K/UL (ref 0–0.51)
EOSINOPHIL NFR BLD: 2.9 % (ref 0–6.9)
ERYTHROCYTE [DISTWIDTH] IN BLOOD BY AUTOMATED COUNT: 49.5 FL (ref 35.9–50)
EST. AVERAGE GLUCOSE BLD GHB EST-MCNC: 123 MG/DL
FASTING STATUS PATIENT QL REPORTED: NORMAL
GLOBULIN SER CALC-MCNC: 3.5 G/DL (ref 1.9–3.5)
GLUCOSE SERPL-MCNC: 93 MG/DL (ref 65–99)
HBA1C MFR BLD: 5.9 % (ref 4–5.6)
HCT VFR BLD AUTO: 43.5 % (ref 37–47)
HDLC SERPL-MCNC: 33 MG/DL
HGB BLD-MCNC: 14 G/DL (ref 12–16)
IMM GRANULOCYTES # BLD AUTO: 0.01 K/UL (ref 0–0.11)
IMM GRANULOCYTES NFR BLD AUTO: 0.2 % (ref 0–0.9)
LDLC SERPL CALC-MCNC: 94 MG/DL
LYMPHOCYTES # BLD AUTO: 2.44 K/UL (ref 1–4.8)
LYMPHOCYTES NFR BLD: 39.2 % (ref 22–41)
MCH RBC QN AUTO: 27.9 PG (ref 27–33)
MCHC RBC AUTO-ENTMCNC: 32.2 G/DL (ref 33.6–35)
MCV RBC AUTO: 86.8 FL (ref 81.4–97.8)
MONOCYTES # BLD AUTO: 0.39 K/UL (ref 0–0.85)
MONOCYTES NFR BLD AUTO: 6.3 % (ref 0–13.4)
NEUTROPHILS # BLD AUTO: 3.17 K/UL (ref 2–7.15)
NEUTROPHILS NFR BLD: 50.9 % (ref 44–72)
NRBC # BLD AUTO: 0 K/UL
NRBC BLD-RTO: 0 /100 WBC
PLATELET # BLD AUTO: 366 K/UL (ref 164–446)
PMV BLD AUTO: 11.1 FL (ref 9–12.9)
POTASSIUM SERPL-SCNC: 4.3 MMOL/L (ref 3.6–5.5)
PROT SERPL-MCNC: 7.5 G/DL (ref 6–8.2)
RBC # BLD AUTO: 5.01 M/UL (ref 4.2–5.4)
SODIUM SERPL-SCNC: 141 MMOL/L (ref 135–145)
T3FREE SERPL-MCNC: 3.72 PG/ML (ref 2–4.4)
T4 FREE SERPL-MCNC: 1.08 NG/DL (ref 0.93–1.7)
TRIGL SERPL-MCNC: 123 MG/DL (ref 0–149)
TSH SERPL DL<=0.005 MIU/L-ACNC: 1.92 UIU/ML (ref 0.38–5.33)
WBC # BLD AUTO: 6.2 K/UL (ref 4.8–10.8)

## 2021-03-27 PROCEDURE — 85025 COMPLETE CBC W/AUTO DIFF WBC: CPT

## 2021-03-27 PROCEDURE — 84439 ASSAY OF FREE THYROXINE: CPT

## 2021-03-27 PROCEDURE — 84481 FREE ASSAY (FT-3): CPT

## 2021-03-27 PROCEDURE — 84443 ASSAY THYROID STIM HORMONE: CPT

## 2021-03-27 PROCEDURE — 80053 COMPREHEN METABOLIC PANEL: CPT

## 2021-03-27 PROCEDURE — 83036 HEMOGLOBIN GLYCOSYLATED A1C: CPT

## 2021-03-27 PROCEDURE — 80061 LIPID PANEL: CPT

## 2021-03-27 PROCEDURE — 36415 COLL VENOUS BLD VENIPUNCTURE: CPT

## 2021-04-12 ENCOUNTER — HOSPITAL ENCOUNTER (EMERGENCY)
Dept: HOSPITAL 8 - ED | Age: 31
Discharge: HOME | End: 2021-04-12
Payer: COMMERCIAL

## 2021-04-12 VITALS — WEIGHT: 194.01 LBS | HEIGHT: 61 IN | BODY MASS INDEX: 36.63 KG/M2

## 2021-04-12 VITALS — SYSTOLIC BLOOD PRESSURE: 110 MMHG | DIASTOLIC BLOOD PRESSURE: 62 MMHG

## 2021-04-12 DIAGNOSIS — M54.5: ICD-10-CM

## 2021-04-12 DIAGNOSIS — R51.9: Primary | ICD-10-CM

## 2021-04-12 LAB
ALBUMIN SERPL-MCNC: 3.4 G/DL (ref 3.4–5)
ALP SERPL-CCNC: 72 U/L (ref 45–117)
ALT SERPL-CCNC: 25 U/L (ref 12–78)
ANION GAP SERPL CALC-SCNC: 7 MMOL/L (ref 5–15)
BASOPHILS # BLD AUTO: 0 X10^3/UL (ref 0–0.1)
BASOPHILS NFR BLD AUTO: 0 % (ref 0–1)
BILIRUB SERPL-MCNC: 0.2 MG/DL (ref 0.2–1)
CALCIUM SERPL-MCNC: 8.3 MG/DL (ref 8.5–10.1)
CHLORIDE SERPL-SCNC: 108 MMOL/L (ref 98–107)
CREAT SERPL-MCNC: 0.81 MG/DL (ref 0.55–1.02)
EOSINOPHIL # BLD AUTO: 0.1 X10^3/UL (ref 0–0.4)
EOSINOPHIL NFR BLD AUTO: 1 % (ref 1–7)
ERYTHROCYTE [DISTWIDTH] IN BLOOD BY AUTOMATED COUNT: 15.9 % (ref 9.6–15.2)
LYMPHOCYTES # BLD AUTO: 0.9 X10^3/UL (ref 1–3.4)
LYMPHOCYTES NFR BLD AUTO: 14 % (ref 22–44)
MCH RBC QN AUTO: 28.1 PG (ref 27–34.8)
MCHC RBC AUTO-ENTMCNC: 33.3 G/DL (ref 32.4–35.8)
MD: NO
MICROSCOPIC: (no result)
MONOCYTES # BLD AUTO: 0.3 X10^3/UL (ref 0.2–0.8)
MONOCYTES NFR BLD AUTO: 5 % (ref 2–9)
NEUTROPHILS # BLD AUTO: 5.2 X10^3/UL (ref 1.8–6.8)
NEUTROPHILS NFR BLD AUTO: 80 % (ref 42–75)
PLATELET # BLD AUTO: 292 X10^3/UL (ref 130–400)
PMV BLD AUTO: 8.6 FL (ref 7.4–10.4)
PROT SERPL-MCNC: 7.9 G/DL (ref 6.4–8.2)
RBC # BLD AUTO: 5.23 X10^6/UL (ref 3.82–5.3)

## 2021-04-12 PROCEDURE — 99283 EMERGENCY DEPT VISIT LOW MDM: CPT

## 2021-04-12 PROCEDURE — 84703 CHORIONIC GONADOTROPIN ASSAY: CPT

## 2021-04-12 PROCEDURE — 36415 COLL VENOUS BLD VENIPUNCTURE: CPT

## 2021-04-12 PROCEDURE — 80053 COMPREHEN METABOLIC PANEL: CPT

## 2021-04-12 PROCEDURE — 81001 URINALYSIS AUTO W/SCOPE: CPT

## 2021-04-12 PROCEDURE — 85025 COMPLETE CBC W/AUTO DIFF WBC: CPT

## 2021-05-05 ENCOUNTER — OFFICE VISIT (OUTPATIENT)
Dept: URGENT CARE | Facility: CLINIC | Age: 31
End: 2021-05-05
Payer: OTHER MISCELLANEOUS

## 2021-05-05 VITALS
TEMPERATURE: 97.5 F | WEIGHT: 192.2 LBS | BODY MASS INDEX: 36.29 KG/M2 | HEIGHT: 61 IN | DIASTOLIC BLOOD PRESSURE: 70 MMHG | RESPIRATION RATE: 20 BRPM | OXYGEN SATURATION: 97 % | HEART RATE: 85 BPM | SYSTOLIC BLOOD PRESSURE: 120 MMHG

## 2021-05-05 DIAGNOSIS — R05.9 COUGH: ICD-10-CM

## 2021-05-05 DIAGNOSIS — J02.9 PHARYNGITIS, UNSPECIFIED ETIOLOGY: ICD-10-CM

## 2021-05-05 DIAGNOSIS — R09.81 NASAL CONGESTION: ICD-10-CM

## 2021-05-05 DIAGNOSIS — R09.82 PND (POST-NASAL DRIP): ICD-10-CM

## 2021-05-05 PROBLEM — R73.02 IMPAIRED GLUCOSE TOLERANCE: Status: ACTIVE | Noted: 2021-04-21

## 2021-05-05 PROBLEM — E66.9 OBESITY: Status: ACTIVE | Noted: 2021-04-21

## 2021-05-05 PROCEDURE — 99213 OFFICE O/P EST LOW 20 MIN: CPT | Performed by: PHYSICIAN ASSISTANT

## 2021-05-05 RX ORDER — BENZONATATE 100 MG/1
100 CAPSULE ORAL 3 TIMES DAILY PRN
Qty: 30 CAPSULE | Refills: 1 | Status: ON HOLD | OUTPATIENT
Start: 2021-05-05 | End: 2023-02-01

## 2021-05-05 RX ORDER — ONDANSETRON 4 MG/1
4 TABLET, ORALLY DISINTEGRATING ORAL EVERY 8 HOURS PRN
COMMUNITY
Start: 2021-03-02 | End: 2023-04-16

## 2021-05-05 RX ORDER — CETIRIZINE HYDROCHLORIDE 10 MG/1
10 TABLET ORAL DAILY
Qty: 90 TABLET | Refills: 1 | Status: ON HOLD | OUTPATIENT
Start: 2021-05-05 | End: 2023-02-01

## 2021-05-05 RX ORDER — FLUTICASONE PROPIONATE 50 MCG
2 SPRAY, SUSPENSION (ML) NASAL DAILY
Qty: 16 G | Refills: 0 | Status: SHIPPED | OUTPATIENT
Start: 2021-05-05 | End: 2023-04-16

## 2021-05-05 RX ORDER — ESCITALOPRAM OXALATE 5 MG/1
5 TABLET ORAL
Status: ON HOLD | COMMUNITY
Start: 2021-02-11 | End: 2023-01-31

## 2021-05-05 RX ORDER — NORETHINDRONE ACETATE AND ETHINYL ESTRADIOL AND FERROUS FUMARATE 1MG-20(21)
1 KIT ORAL
Status: ON HOLD | COMMUNITY
Start: 2021-04-25 | End: 2023-01-31

## 2021-05-05 RX ORDER — LIDOCAINE HYDROCHLORIDE 20 MG/ML
5 SOLUTION OROPHARYNGEAL PRN
Qty: 120 ML | Refills: 0 | Status: ON HOLD | OUTPATIENT
Start: 2021-05-05 | End: 2023-02-01

## 2021-05-05 RX ORDER — TRIAMCINOLONE ACETONIDE 1 MG/G
CREAM TOPICAL
Status: ON HOLD | COMMUNITY
Start: 2021-05-03 | End: 2023-01-31

## 2021-05-05 ASSESSMENT — ENCOUNTER SYMPTOMS
ABDOMINAL PAIN: 0
VOMITING: 0
SORE THROAT: 1
WHEEZING: 0
SPUTUM PRODUCTION: 0
FEVER: 0
CHILLS: 0
DIARRHEA: 0
COUGH: 1
NAUSEA: 0
SHORTNESS OF BREATH: 0

## 2021-05-05 ASSESSMENT — FIBROSIS 4 INDEX: FIB4 SCORE: 0.55

## 2021-05-06 NOTE — PROGRESS NOTES
"Subjective:   Sherrell Toro  is a 30 y.o. female who presents for Sore Throat (Started sometime last week, Dry cough at night )      HPI  Patient comes clinic complaining last 1 to 2 weeks of noting sore throat worse at night and in the morning and improves through the day as well as dry cough.  Denies fevers chills.  Denies nausea vomiting abdominal pain diarrhea or rash.  Notes mild dry coughing.  Has had a few episodes of yellow purulence with cough but mainly when clearing throat.  Denies history of asthma bronchitis or pneumonia.  Denies history of strep or seasonal allergies.  Denies known exposure to individuals with Covid or strep.  Denies loss of taste or smell.  Has tried TheraFlu and ibuprofen with mild improved symptoms.      Review of Systems   Constitutional: Negative for chills and fever.   HENT: Positive for congestion and sore throat. Negative for ear pain.    Respiratory: Positive for cough. Negative for sputum production, shortness of breath and wheezing.    Gastrointestinal: Negative for abdominal pain, diarrhea, nausea and vomiting.   Skin: Negative for rash.       No Known Allergies     Objective:   /70 (BP Location: Right arm, Patient Position: Sitting, BP Cuff Size: Adult)   Pulse 85   Temp 36.4 °C (97.5 °F) (Temporal)   Resp 20   Ht 1.549 m (5' 1\")   Wt 87.2 kg (192 lb 3.2 oz)   SpO2 97%   BMI 36.32 kg/m²     Physical Exam  Vitals and nursing note reviewed.   Constitutional:       General: She is not in acute distress.     Appearance: She is well-developed. She is not diaphoretic.   HENT:      Head: Normocephalic and atraumatic.      Right Ear: Tympanic membrane, ear canal and external ear normal.      Left Ear: Tympanic membrane, ear canal and external ear normal.      Nose: Nose normal.      Mouth/Throat:      Pharynx: Uvula midline. Posterior oropharyngeal erythema ( mild PND) present. No oropharyngeal exudate.      Tonsils: No tonsillar abscesses.   Eyes:      " General: Lids are normal. No scleral icterus.        Right eye: No discharge.         Left eye: No discharge.      Conjunctiva/sclera: Conjunctivae normal.   Pulmonary:      Effort: Pulmonary effort is normal. No respiratory distress.      Breath sounds: No decreased breath sounds, wheezing, rhonchi or rales.   Musculoskeletal:         General: Normal range of motion.      Cervical back: Neck supple.   Lymphadenopathy:      Cervical: Cervical adenopathy ( mild bilat) present.   Skin:     General: Skin is warm and dry.      Coloration: Skin is not pale.      Findings: No erythema.   Neurological:      Mental Status: She is alert and oriented to person, place, and time. She is not disoriented.   Psychiatric:         Speech: Speech normal.         Behavior: Behavior normal.     Point-of-care strep testing is negative.    Assessment/Plan:   1. Pharyngitis, unspecified etiology  - POCT Rapid Strep A    Other orders  - JUNEL FE 1/20 1-20 MG-MCG per tablet; Take 1 tablet by mouth.  - ondansetron (ZOFRAN ODT) 4 MG TABLET DISPERSIBLE; Take 4 mg by mouth every 8 hours as needed. DISSOLVE 1 TABLET IN MOUTH EVERY 8 HOURS AS NEEDED FOR NAUSEA AND VOMITING  - triamcinolone acetonide (KENALOG) 0.1 % Cream; APPLY CREAM EXTERNALLY TO AFFECTED AREA TWICE DAILY  - escitalopram (LEXAPRO) 5 MG tablet; Take 5 mg by mouth.  Supportive care is reviewed with patient/caregiver - recommend to push PO fluids and electrolytes, Nsaids/tylenol, netti pot/saline irrig, humidifier in home, flonase, ponaris, antihistamine, tessalon  Return to clinic with lack of resolution or progression of symptoms.      I have worn an N95 mask, gloves and eye protection for the entire encounter with this patient.     Differential diagnosis, natural history, supportive care, and indications for immediate follow-up discussed.

## 2021-06-18 ENCOUNTER — HOSPITAL ENCOUNTER (OUTPATIENT)
Dept: LAB | Facility: MEDICAL CENTER | Age: 31
End: 2021-06-18
Attending: FAMILY MEDICINE
Payer: OTHER MISCELLANEOUS

## 2021-06-18 LAB — COVID ORDER STATUS COVID19: NORMAL

## 2021-06-18 PROCEDURE — U0005 INFEC AGEN DETEC AMPLI PROBE: HCPCS

## 2021-06-18 PROCEDURE — C9803 HOPD COVID-19 SPEC COLLECT: HCPCS

## 2021-06-18 PROCEDURE — U0003 INFECTIOUS AGENT DETECTION BY NUCLEIC ACID (DNA OR RNA); SEVERE ACUTE RESPIRATORY SYNDROME CORONAVIRUS 2 (SARS-COV-2) (CORONAVIRUS DISEASE [COVID-19]), AMPLIFIED PROBE TECHNIQUE, MAKING USE OF HIGH THROUGHPUT TECHNOLOGIES AS DESCRIBED BY CMS-2020-01-R: HCPCS

## 2021-06-20 LAB
SARS-COV-2 RNA RESP QL NAA+PROBE: NOTDETECTED
SPECIMEN SOURCE: NORMAL

## 2022-07-30 ENCOUNTER — HOSPITAL ENCOUNTER (OUTPATIENT)
Dept: LAB | Facility: MEDICAL CENTER | Age: 32
End: 2022-07-30
Attending: NURSE PRACTITIONER
Payer: COMMERCIAL

## 2022-07-30 LAB
B-HCG SERPL-ACNC: 3550 MIU/ML (ref 0–5)
BASOPHILS # BLD AUTO: 0.9 % (ref 0–1.8)
BASOPHILS # BLD: 0.05 K/UL (ref 0–0.12)
EOSINOPHIL # BLD AUTO: 0.05 K/UL (ref 0–0.51)
EOSINOPHIL NFR BLD: 0.9 % (ref 0–6.9)
ERYTHROCYTE [DISTWIDTH] IN BLOOD BY AUTOMATED COUNT: 48.3 FL (ref 35.9–50)
FERRITIN SERPL-MCNC: 57.1 NG/ML (ref 10–291)
HCT VFR BLD AUTO: 41.5 % (ref 37–47)
HGB BLD-MCNC: 13.3 G/DL (ref 12–16)
LYMPHOCYTES # BLD AUTO: 2.36 K/UL (ref 1–4.8)
LYMPHOCYTES NFR BLD: 38.7 % (ref 22–41)
MANUAL DIFF BLD: NORMAL
MCH RBC QN AUTO: 26.7 PG (ref 27–33)
MCHC RBC AUTO-ENTMCNC: 32 G/DL (ref 33.6–35)
MCV RBC AUTO: 83.2 FL (ref 81.4–97.8)
MONOCYTES # BLD AUTO: 0.05 K/UL (ref 0–0.85)
MONOCYTES NFR BLD AUTO: 0.9 % (ref 0–13.4)
MORPHOLOGY BLD-IMP: NORMAL
NEUTROPHILS # BLD AUTO: 3.57 K/UL (ref 2–7.15)
NEUTROPHILS NFR BLD: 58.6 % (ref 44–72)
NRBC # BLD AUTO: 0 K/UL
NRBC BLD-RTO: 0 /100 WBC
PLATELET # BLD AUTO: 273 K/UL (ref 164–446)
PLATELET BLD QL SMEAR: NORMAL
PMV BLD AUTO: 10.7 FL (ref 9–12.9)
RBC # BLD AUTO: 4.99 M/UL (ref 4.2–5.4)
RBC BLD AUTO: NORMAL
T3FREE SERPL-MCNC: 3.89 PG/ML (ref 2–4.4)
T4 FREE SERPL-MCNC: 1.03 NG/DL (ref 0.93–1.7)
TSH SERPL DL<=0.005 MIU/L-ACNC: 0.08 UIU/ML (ref 0.38–5.33)
WBC # BLD AUTO: 6.1 K/UL (ref 4.8–10.8)

## 2022-07-30 PROCEDURE — 82728 ASSAY OF FERRITIN: CPT

## 2022-07-30 PROCEDURE — 85007 BL SMEAR W/DIFF WBC COUNT: CPT

## 2022-07-30 PROCEDURE — 84439 ASSAY OF FREE THYROXINE: CPT

## 2022-07-30 PROCEDURE — 36415 COLL VENOUS BLD VENIPUNCTURE: CPT

## 2022-07-30 PROCEDURE — 84481 FREE ASSAY (FT-3): CPT

## 2022-07-30 PROCEDURE — 84443 ASSAY THYROID STIM HORMONE: CPT

## 2022-07-30 PROCEDURE — 84702 CHORIONIC GONADOTROPIN TEST: CPT

## 2022-07-30 PROCEDURE — 85025 COMPLETE CBC W/AUTO DIFF WBC: CPT

## 2023-01-31 ENCOUNTER — HOSPITAL ENCOUNTER (OUTPATIENT)
Facility: MEDICAL CENTER | Age: 33
End: 2023-02-01
Attending: STUDENT IN AN ORGANIZED HEALTH CARE EDUCATION/TRAINING PROGRAM | Admitting: STUDENT IN AN ORGANIZED HEALTH CARE EDUCATION/TRAINING PROGRAM
Payer: COMMERCIAL

## 2023-01-31 PROBLEM — Z3A.31 31 WEEKS GESTATION OF PREGNANCY: Status: ACTIVE | Noted: 2023-01-31

## 2023-01-31 PROBLEM — I44.1 MOBITZ TYPE 2 SECOND DEGREE AV BLOCK: Status: ACTIVE | Noted: 2023-01-31

## 2023-01-31 PROBLEM — R00.1 SYMPTOMATIC BRADYCARDIA: Status: ACTIVE | Noted: 2023-01-31

## 2023-01-31 PROCEDURE — 93005 ELECTROCARDIOGRAM TRACING: CPT | Performed by: INTERNAL MEDICINE

## 2023-01-31 PROCEDURE — G0378 HOSPITAL OBSERVATION PER HR: HCPCS

## 2023-01-31 PROCEDURE — 99222 1ST HOSP IP/OBS MODERATE 55: CPT | Performed by: INTERNAL MEDICINE

## 2023-01-31 RX ORDER — PROMETHAZINE HYDROCHLORIDE 25 MG/1
12.5-25 SUPPOSITORY RECTAL EVERY 4 HOURS PRN
Status: DISCONTINUED | OUTPATIENT
Start: 2023-01-31 | End: 2023-02-01 | Stop reason: HOSPADM

## 2023-01-31 RX ORDER — NORETHINDRONE ACETATE AND ETHINYL ESTRADIOL 1MG-20(21)
1 KIT ORAL DAILY
Status: DISCONTINUED | OUTPATIENT
Start: 2023-02-01 | End: 2023-01-31

## 2023-01-31 RX ORDER — POLYETHYLENE GLYCOL 3350 17 G/17G
1 POWDER, FOR SOLUTION ORAL
Status: DISCONTINUED | OUTPATIENT
Start: 2023-01-31 | End: 2023-02-01 | Stop reason: HOSPADM

## 2023-01-31 RX ORDER — PROCHLORPERAZINE EDISYLATE 5 MG/ML
5-10 INJECTION INTRAMUSCULAR; INTRAVENOUS EVERY 4 HOURS PRN
Status: DISCONTINUED | OUTPATIENT
Start: 2023-01-31 | End: 2023-02-01 | Stop reason: HOSPADM

## 2023-01-31 RX ORDER — ACETAMINOPHEN 325 MG/1
650 TABLET ORAL EVERY 6 HOURS PRN
Status: DISCONTINUED | OUTPATIENT
Start: 2023-01-31 | End: 2023-02-01 | Stop reason: HOSPADM

## 2023-01-31 RX ORDER — ONDANSETRON 4 MG/1
4 TABLET, ORALLY DISINTEGRATING ORAL EVERY 4 HOURS PRN
Status: DISCONTINUED | OUTPATIENT
Start: 2023-01-31 | End: 2023-02-01 | Stop reason: HOSPADM

## 2023-01-31 RX ORDER — PROMETHAZINE HYDROCHLORIDE 25 MG/1
12.5-25 TABLET ORAL EVERY 4 HOURS PRN
Status: DISCONTINUED | OUTPATIENT
Start: 2023-01-31 | End: 2023-02-01 | Stop reason: HOSPADM

## 2023-01-31 RX ORDER — BISACODYL 10 MG
10 SUPPOSITORY, RECTAL RECTAL
Status: DISCONTINUED | OUTPATIENT
Start: 2023-01-31 | End: 2023-02-01 | Stop reason: HOSPADM

## 2023-01-31 RX ORDER — AMOXICILLIN 250 MG
2 CAPSULE ORAL 2 TIMES DAILY
Status: DISCONTINUED | OUTPATIENT
Start: 2023-01-31 | End: 2023-02-01 | Stop reason: HOSPADM

## 2023-01-31 RX ORDER — ONDANSETRON 2 MG/ML
4 INJECTION INTRAMUSCULAR; INTRAVENOUS EVERY 4 HOURS PRN
Status: DISCONTINUED | OUTPATIENT
Start: 2023-01-31 | End: 2023-02-01 | Stop reason: HOSPADM

## 2023-01-31 RX ORDER — ENOXAPARIN SODIUM 100 MG/ML
40 INJECTION SUBCUTANEOUS DAILY
Status: DISCONTINUED | OUTPATIENT
Start: 2023-02-01 | End: 2023-02-01 | Stop reason: HOSPADM

## 2023-01-31 ASSESSMENT — COGNITIVE AND FUNCTIONAL STATUS - GENERAL
SUGGESTED CMS G CODE MODIFIER DAILY ACTIVITY: CH
SUGGESTED CMS G CODE MODIFIER MOBILITY: CH
DAILY ACTIVITIY SCORE: 24
MOBILITY SCORE: 24

## 2023-01-31 ASSESSMENT — ENCOUNTER SYMPTOMS
CHILLS: 0
NERVOUS/ANXIOUS: 0
DOUBLE VISION: 0
POLYDIPSIA: 0
SPUTUM PRODUCTION: 0
WEIGHT LOSS: 0
BLURRED VISION: 0
HALLUCINATIONS: 0
ABDOMINAL PAIN: 0
COUGH: 0
MYALGIAS: 0
FLANK PAIN: 0
HEARTBURN: 0
BACK PAIN: 0
HEMOPTYSIS: 0
BRUISES/BLEEDS EASILY: 0
VOMITING: 0
DIZZINESS: 0
FEVER: 0
PALPITATIONS: 0
PHOTOPHOBIA: 0
NECK PAIN: 0
ORTHOPNEA: 0
SPEECH CHANGE: 0
HEADACHES: 0
TREMORS: 0
NAUSEA: 0
SHORTNESS OF BREATH: 0
FOCAL WEAKNESS: 0

## 2023-01-31 ASSESSMENT — LIFESTYLE VARIABLES
TOTAL SCORE: 0
TOTAL SCORE: 0
SUBSTANCE_ABUSE: 0
ALCOHOL_USE: NO
ON A TYPICAL DAY WHEN YOU DRINK ALCOHOL HOW MANY DRINKS DO YOU HAVE: 0
AVERAGE NUMBER OF DAYS PER WEEK YOU HAVE A DRINK CONTAINING ALCOHOL: 0
TOTAL SCORE: 0
EVER FELT BAD OR GUILTY ABOUT YOUR DRINKING: NO
EVER HAD A DRINK FIRST THING IN THE MORNING TO STEADY YOUR NERVES TO GET RID OF A HANGOVER: NO
TOTAL SCORE: 0
HAVE PEOPLE ANNOYED YOU BY CRITICIZING YOUR DRINKING: NO
HAVE YOU EVER FELT YOU SHOULD CUT DOWN ON YOUR DRINKING: NO
HAVE PEOPLE ANNOYED YOU BY CRITICIZING YOUR DRINKING: NO
ALCOHOL_USE: NO
EVER HAD A DRINK FIRST THING IN THE MORNING TO STEADY YOUR NERVES TO GET RID OF A HANGOVER: NO
HAVE YOU EVER FELT YOU SHOULD CUT DOWN ON YOUR DRINKING: NO
CONSUMPTION TOTAL: NEGATIVE
CONSUMPTION TOTAL: INCOMPLETE
EVER FELT BAD OR GUILTY ABOUT YOUR DRINKING: NO
TOTAL SCORE: 0
HOW MANY TIMES IN THE PAST YEAR HAVE YOU HAD 5 OR MORE DRINKS IN A DAY: 0
TOTAL SCORE: 0

## 2023-01-31 ASSESSMENT — PATIENT HEALTH QUESTIONNAIRE - PHQ9
SUM OF ALL RESPONSES TO PHQ9 QUESTIONS 1 AND 2: 0
1. LITTLE INTEREST OR PLEASURE IN DOING THINGS: NOT AT ALL
2. FEELING DOWN, DEPRESSED, IRRITABLE, OR HOPELESS: NOT AT ALL

## 2023-01-31 ASSESSMENT — FIBROSIS 4 INDEX
FIB4 SCORE: 0.36
FIB4 SCORE: 0.36

## 2023-02-01 VITALS
BODY MASS INDEX: 37.67 KG/M2 | HEART RATE: 89 BPM | TEMPERATURE: 98.1 F | SYSTOLIC BLOOD PRESSURE: 107 MMHG | WEIGHT: 199.52 LBS | RESPIRATION RATE: 18 BRPM | OXYGEN SATURATION: 96 % | HEIGHT: 61 IN | DIASTOLIC BLOOD PRESSURE: 69 MMHG

## 2023-02-01 PROBLEM — R00.1 SYMPTOMATIC BRADYCARDIA: Status: RESOLVED | Noted: 2023-01-31 | Resolved: 2023-02-01

## 2023-02-01 LAB
ALBUMIN SERPL BCP-MCNC: 3.1 G/DL (ref 3.2–4.9)
ALBUMIN/GLOB SERPL: 1 G/DL
ALP SERPL-CCNC: 79 U/L (ref 30–99)
ALT SERPL-CCNC: 10 U/L (ref 2–50)
ANION GAP SERPL CALC-SCNC: 11 MMOL/L (ref 7–16)
AST SERPL-CCNC: 15 U/L (ref 12–45)
BASOPHILS # BLD AUTO: 0.2 % (ref 0–1.8)
BASOPHILS # BLD: 0.02 K/UL (ref 0–0.12)
BILIRUB SERPL-MCNC: 0.2 MG/DL (ref 0.1–1.5)
BUN SERPL-MCNC: 7 MG/DL (ref 8–22)
CALCIUM ALBUM COR SERPL-MCNC: 9.3 MG/DL (ref 8.5–10.5)
CALCIUM SERPL-MCNC: 8.6 MG/DL (ref 8.5–10.5)
CHLORIDE SERPL-SCNC: 104 MMOL/L (ref 96–112)
CO2 SERPL-SCNC: 20 MMOL/L (ref 20–33)
CREAT SERPL-MCNC: 0.39 MG/DL (ref 0.5–1.4)
EKG IMPRESSION: NORMAL
EOSINOPHIL # BLD AUTO: 0.13 K/UL (ref 0–0.51)
EOSINOPHIL NFR BLD: 1.5 % (ref 0–6.9)
ERYTHROCYTE [DISTWIDTH] IN BLOOD BY AUTOMATED COUNT: 44.6 FL (ref 35.9–50)
GFR SERPLBLD CREATININE-BSD FMLA CKD-EPI: 136 ML/MIN/1.73 M 2
GLOBULIN SER CALC-MCNC: 3.2 G/DL (ref 1.9–3.5)
GLUCOSE SERPL-MCNC: 128 MG/DL (ref 65–99)
HCT VFR BLD AUTO: 34.8 % (ref 37–47)
HGB BLD-MCNC: 11.3 G/DL (ref 12–16)
IMM GRANULOCYTES # BLD AUTO: 0.04 K/UL (ref 0–0.11)
IMM GRANULOCYTES NFR BLD AUTO: 0.5 % (ref 0–0.9)
LYMPHOCYTES # BLD AUTO: 2.38 K/UL (ref 1–4.8)
LYMPHOCYTES NFR BLD: 27.8 % (ref 22–41)
MAGNESIUM SERPL-MCNC: 1.7 MG/DL (ref 1.5–2.5)
MCH RBC QN AUTO: 27.7 PG (ref 27–33)
MCHC RBC AUTO-ENTMCNC: 32.5 G/DL (ref 33.6–35)
MCV RBC AUTO: 85.3 FL (ref 81.4–97.8)
MONOCYTES # BLD AUTO: 0.41 K/UL (ref 0–0.85)
MONOCYTES NFR BLD AUTO: 4.8 % (ref 0–13.4)
NEUTROPHILS # BLD AUTO: 5.59 K/UL (ref 2–7.15)
NEUTROPHILS NFR BLD: 65.2 % (ref 44–72)
NRBC # BLD AUTO: 0 K/UL
NRBC BLD-RTO: 0 /100 WBC
PLATELET # BLD AUTO: 303 K/UL (ref 164–446)
PMV BLD AUTO: 10.5 FL (ref 9–12.9)
POTASSIUM SERPL-SCNC: 3.2 MMOL/L (ref 3.6–5.5)
PROT SERPL-MCNC: 6.3 G/DL (ref 6–8.2)
RBC # BLD AUTO: 4.08 M/UL (ref 4.2–5.4)
SODIUM SERPL-SCNC: 135 MMOL/L (ref 135–145)
TSH SERPL DL<=0.005 MIU/L-ACNC: 2.56 UIU/ML (ref 0.38–5.33)
WBC # BLD AUTO: 8.6 K/UL (ref 4.8–10.8)

## 2023-02-01 PROCEDURE — 85025 COMPLETE CBC W/AUTO DIFF WBC: CPT

## 2023-02-01 PROCEDURE — A9270 NON-COVERED ITEM OR SERVICE: HCPCS | Performed by: STUDENT IN AN ORGANIZED HEALTH CARE EDUCATION/TRAINING PROGRAM

## 2023-02-01 PROCEDURE — 99239 HOSP IP/OBS DSCHRG MGMT >30: CPT | Performed by: STUDENT IN AN ORGANIZED HEALTH CARE EDUCATION/TRAINING PROGRAM

## 2023-02-01 PROCEDURE — 700102 HCHG RX REV CODE 250 W/ 637 OVERRIDE(OP): Performed by: STUDENT IN AN ORGANIZED HEALTH CARE EDUCATION/TRAINING PROGRAM

## 2023-02-01 PROCEDURE — 80053 COMPREHEN METABOLIC PANEL: CPT

## 2023-02-01 PROCEDURE — 700102 HCHG RX REV CODE 250 W/ 637 OVERRIDE(OP): Performed by: INTERNAL MEDICINE

## 2023-02-01 PROCEDURE — 83735 ASSAY OF MAGNESIUM: CPT

## 2023-02-01 PROCEDURE — 84443 ASSAY THYROID STIM HORMONE: CPT

## 2023-02-01 PROCEDURE — 59025 FETAL NON-STRESS TEST: CPT

## 2023-02-01 PROCEDURE — 36415 COLL VENOUS BLD VENIPUNCTURE: CPT

## 2023-02-01 PROCEDURE — 93010 ELECTROCARDIOGRAM REPORT: CPT | Performed by: INTERNAL MEDICINE

## 2023-02-01 PROCEDURE — A9270 NON-COVERED ITEM OR SERVICE: HCPCS | Performed by: INTERNAL MEDICINE

## 2023-02-01 PROCEDURE — G0378 HOSPITAL OBSERVATION PER HR: HCPCS

## 2023-02-01 PROCEDURE — 99233 SBSQ HOSP IP/OBS HIGH 50: CPT | Performed by: INTERNAL MEDICINE

## 2023-02-01 RX ORDER — POTASSIUM CHLORIDE 20 MEQ/1
40 TABLET, EXTENDED RELEASE ORAL ONCE
Status: COMPLETED | OUTPATIENT
Start: 2023-02-01 | End: 2023-02-01

## 2023-02-01 RX ORDER — VITAMIN A ACETATE, BETA CAROTENE, ASCORBIC ACID, CHOLECALCIFEROL, .ALPHA.-TOCOPHEROL ACETATE, DL-, THIAMINE MONONITRATE, RIBOFLAVIN, NIACINAMIDE, PYRIDOXINE HYDROCHLORIDE, FOLIC ACID, CYANOCOBALAMIN, CALCIUM CARBONATE, FERROUS FUMARATE, ZINC OXIDE, CUPRIC OXIDE 3080; 12; 120; 400; 1; 1.84; 3; 20; 22; 920; 25; 200; 27; 10; 2 [IU]/1; UG/1; MG/1; [IU]/1; MG/1; MG/1; MG/1; MG/1; MG/1; [IU]/1; MG/1; MG/1; MG/1; MG/1; MG/1
1 TABLET, FILM COATED ORAL
Status: DISCONTINUED | OUTPATIENT
Start: 2023-02-02 | End: 2023-02-01 | Stop reason: HOSPADM

## 2023-02-01 RX ADMIN — POTASSIUM CHLORIDE 40 MEQ: 1500 TABLET, EXTENDED RELEASE ORAL at 08:57

## 2023-02-01 RX ADMIN — POTASSIUM CHLORIDE 40 MEQ: 1500 TABLET, EXTENDED RELEASE ORAL at 05:13

## 2023-02-01 ASSESSMENT — ENCOUNTER SYMPTOMS
ENDOCRINE NEGATIVE: 1
ABDOMINAL DISTENTION: 0
SHORTNESS OF BREATH: 0
GASTROINTESTINAL NEGATIVE: 1
EYES NEGATIVE: 1
NEUROLOGICAL NEGATIVE: 1
ARTHRALGIAS: 0
ALLERGIC/IMMUNOLOGIC NEGATIVE: 1
NUMBNESS: 0
PSYCHIATRIC NEGATIVE: 1
HEMATOLOGIC/LYMPHATIC NEGATIVE: 1
PALPITATIONS: 0
CONSTITUTIONAL NEGATIVE: 1
CARDIOVASCULAR NEGATIVE: 1
RESPIRATORY NEGATIVE: 1
LIGHT-HEADEDNESS: 0
DIZZINESS: 0
NAUSEA: 0

## 2023-02-01 NOTE — PROGRESS NOTES
Cardiology Follow Up Progress Note    Date of Service  2/1/2023    Attending Physician  Dariela Shah M.D.    Chief Complaint   31-week pregnancy with abnormal event recorder    HPI  Sherrell Toro is a 32 y.o. female admitted 1/31/2023 with 31-week pregnancy is abnormal but recorder    Interim Events  No events on telemetry overnight  No prior cardiac history.  Echocardiogram pending.  I have called for the event recorder to be sent to us.      Review of Systems  Review of Systems   Constitutional: Negative.    HENT: Negative.     Eyes: Negative.    Respiratory: Negative.  Negative for shortness of breath.    Cardiovascular: Negative.  Negative for chest pain, palpitations and leg swelling.   Gastrointestinal: Negative.  Negative for abdominal distention and nausea.   Endocrine: Negative.    Genitourinary: Negative.    Musculoskeletal:  Negative for arthralgias.   Skin: Negative.    Allergic/Immunologic: Negative.    Neurological: Negative.  Negative for dizziness, light-headedness and numbness.   Hematological: Negative.    Psychiatric/Behavioral: Negative.       Vital signs in last 24 hours  Temp:  [36.3 °C (97.3 °F)-36.8 °C (98.2 °F)] 36.7 °C (98.1 °F)  Pulse:  [72-89] 89  Resp:  [16-19] 18  BP: ()/(58-69) 107/69  SpO2:  [95 %-97 %] 96 %    Physical Exam  Physical Exam  Vitals and nursing note reviewed.   Constitutional:       General: She is not in acute distress.     Appearance: Normal appearance. She is normal weight. She is not ill-appearing, toxic-appearing or diaphoretic.   HENT:      Head: Normocephalic and atraumatic.      Right Ear: Ear canal and external ear normal.      Left Ear: Ear canal and external ear normal.      Nose: Nose normal. No congestion or rhinorrhea.      Mouth/Throat:      Mouth: Mucous membranes are moist.      Pharynx: Oropharynx is clear. No oropharyngeal exudate or posterior oropharyngeal erythema.   Eyes:      General: No scleral icterus.        Right eye: No  discharge.         Left eye: No discharge.      Extraocular Movements: Extraocular movements intact.      Conjunctiva/sclera: Conjunctivae normal.      Pupils: Pupils are equal, round, and reactive to light.   Neck:      Vascular: No carotid bruit.   Cardiovascular:      Rate and Rhythm: Normal rate and regular rhythm.      Pulses: Normal pulses.      Heart sounds: Normal heart sounds. No murmur heard.    No gallop.   Pulmonary:      Effort: Pulmonary effort is normal. No respiratory distress.      Breath sounds: Normal breath sounds. No stridor. No wheezing, rhonchi or rales.   Abdominal:      General: Abdomen is flat. Bowel sounds are normal. There is no distension.      Palpations: Abdomen is soft. There is no mass.      Tenderness: There is no abdominal tenderness. There is no guarding or rebound.      Hernia: No hernia is present.   Musculoskeletal:         General: No swelling or tenderness. Normal range of motion.      Cervical back: Normal range of motion and neck supple. No rigidity. No muscular tenderness.      Right lower leg: No edema.      Left lower leg: No edema.   Lymphadenopathy:      Cervical: No cervical adenopathy.   Skin:     General: Skin is warm and dry.      Capillary Refill: Capillary refill takes 2 to 3 seconds.      Coloration: Skin is not jaundiced or pale.      Findings: No bruising or lesion.   Neurological:      General: No focal deficit present.      Mental Status: She is alert and oriented to person, place, and time. Mental status is at baseline.      Cranial Nerves: No cranial nerve deficit.      Motor: No weakness.   Psychiatric:         Mood and Affect: Mood normal.         Behavior: Behavior normal.         Thought Content: Thought content normal.         Judgment: Judgment normal.       Lab Review  Lab Results   Component Value Date/Time    WBC 8.6 02/01/2023 12:35 AM    RBC 4.08 (L) 02/01/2023 12:35 AM    HEMOGLOBIN 11.3 (L) 02/01/2023 12:35 AM    HEMATOCRIT 34.8 (L) 02/01/2023  12:35 AM    MCV 85.3 02/01/2023 12:35 AM    MCH 27.7 02/01/2023 12:35 AM    MCHC 32.5 (L) 02/01/2023 12:35 AM    MPV 10.5 02/01/2023 12:35 AM      Lab Results   Component Value Date/Time    SODIUM 135 02/01/2023 12:35 AM    POTASSIUM 3.2 (L) 02/01/2023 12:35 AM    CHLORIDE 104 02/01/2023 12:35 AM    CO2 20 02/01/2023 12:35 AM    GLUCOSE 128 (H) 02/01/2023 12:35 AM    BUN 7 (L) 02/01/2023 12:35 AM    CREATININE 0.39 (L) 02/01/2023 12:35 AM      Lab Results   Component Value Date/Time    ASTSGOT 15 02/01/2023 12:35 AM    ALTSGPT 10 02/01/2023 12:35 AM     Lab Results   Component Value Date/Time    CHOLSTRLTOT 152 03/27/2021 08:19 AM    LDL 94 03/27/2021 08:19 AM    HDL 33 (A) 03/27/2021 08:19 AM    TRIGLYCERIDE 123 03/27/2021 08:19 AM       No results for input(s): NTPROBNP in the last 72 hours.    Cardiac Imaging and Procedures Review  EKG:  My personal interpretation of the EKG dated 2/1/2023 shows normal sinus rhythm otherwise normal ECG    Echocardiogram: Dated 1/31/2023 personally viewed and inter myself showing normal LV systolic function no valvular heart disease.    Cardiac Catheterization:  NA    Imaging  Chest X-Ray:  NA     Stress Test:  NA    Assessment/Plan  No new Assessment & Plan notes have been filed under this hospital service since the last note was generated.  Service: Cardiology  32-year-old female with a past medical history of atrial fibrillation with report of Mobitz 2 on her event recorder.  She is clinically stable at this point.  I am not recommending any further cardiac testing.  I  will get a copy of the Zio patch however if it does show asymptomatic Mobitz 2 we will not pursue a pacemaker nor would we recommend 1.    Thank you for allowing me to participate in the care of this patient.  I will continue to follow this patient    Please contact me with any questions.    Nelson Beck M.D.   Cardiologist, [facility]  [phone]

## 2023-02-01 NOTE — PROGRESS NOTES
[facility] TRIAGE OFFICER DIRECT ADMISSION REPORT  Transferring facility: [facility]  Transferring physician: Dr. Arriaga  Chief complaint: Suspected Mobitz type II by outpatient cardiologist  Pertinent history & patient course:   32-year-old female  31 wk was referred to [facility] emergency department by her outpatient cardiologist for suspected Mobitz 2 noted at office.  At the emergency department, patient has remained in sinus rhythm.  [facility] does not have OB/GYN inpatient capabilities and thus patient was transferred to [facility] for bradycardia work-up and management.  Case was discussed with OB/GYN (Dr. Trduy Arguello) who would like to be contacted on arrival.  Case was discussed with Cardiology (Dr. Sharma), who was in agreement with cardiology consult on arrival.    Pertinent imaging & lab results: As per above  Code Status: FULL per transferring provider, I personally verified with the transferring provider patient's code status and the transferring provider has confirmed this with the patient.  Further work up or recommendations per triage officer prior to transfer: None  Consultants called prior to transfer and pertinent input from consultants: As above  Patient accepted for transfer: Yes  Consultants to be called upon arrival: Called OB/GYN and cardiology  Admission status: Observation.   Floor requested: Telemetry observation  If ICU transfer, name of intensivist case discussed with and pertinent input from critical care: N/A    Please inform the triage officer upon arrival of the patient to [facility] for assignment of a hospitalist to perform admission.     For any question or concerns regarding the care of this patient, please reach out to the assigned hospitalist.

## 2023-02-01 NOTE — H&P
Hospital Medicine History & Physical Note    Date of Service  2023    Primary Care Physician  Penny Teixeira M.D.    Consultants  Cardiology Dr Elliott  OB/GYN Dr. Arguello    Code Status  Full Code    Chief Complaint  dizziness, heart block    History of Presenting Illness  Sherrell Toro is a 32 y.o. female with past medical history of paroxysmal A. fib requiring cardioversion after her first pregnancy a few years ago,  @ 30w6d , who was transferred from [facility] for observation with outpatient cardiac monitoring analysis showing an episode of AV block second-degree type Mobitz 2, lasting 2 to 4 seconds.  Recent echo 2023 without significant abnormalities,  EF 55 to 60%.  Patient did report occasional lightheadedness, currently denies any complaints.  She only takes prenatal vitamins.  Cardiology and OB/GYN consulted.    I discussed the plan of care with patient.    Review of Systems  Review of Systems   Constitutional:  Negative for chills, fever and weight loss.   HENT:  Negative for ear pain, hearing loss and tinnitus.    Eyes:  Negative for blurred vision, double vision and photophobia.   Respiratory:  Negative for cough, hemoptysis and sputum production.    Cardiovascular:  Negative for chest pain, palpitations and orthopnea.   Gastrointestinal:  Negative for heartburn, nausea and vomiting.   Genitourinary:  Negative for dysuria, flank pain, frequency and hematuria.   Musculoskeletal:  Negative for back pain, joint pain and neck pain.   Skin:  Negative for itching and rash.   Neurological:  Negative for tremors, speech change, focal weakness and headaches.   Endo/Heme/Allergies:  Negative for environmental allergies and polydipsia. Does not bruise/bleed easily.   Psychiatric/Behavioral:  Negative for hallucinations and substance abuse. The patient is not nervous/anxious.      Past Medical History   has a past medical history of Depression.    Surgical History    has a past surgical history that includes primary c section; other (Right); myomectomy; and ovarian cystectomy (Right).     Family History     Family history reviewed with patient. There is no family history that is pertinent to the chief complaint.     Social History   reports that she has never smoked. She has never used smokeless tobacco. She reports that she does not drink alcohol and does not use drugs.    Allergies  No Known Allergies    Medications  Prior to Admission Medications   Prescriptions Last Dose Informant Patient Reported? Taking?   JUNEL FE 1/20 1-20 MG-MCG per tablet   Yes No   Sig: Take 1 tablet by mouth.   benzonatate (TESSALON) 100 MG Cap   No No   Sig: Take 1 capsule by mouth 3 times a day as needed for Cough.   cetirizine (ZYRTEC ALLERGY) 10 MG Tab   No No   Sig: Take 1 tablet by mouth every day.   escitalopram (LEXAPRO) 10 MG Tab   Yes No   Sig: Take 10 mg by mouth every day.   escitalopram (LEXAPRO) 5 MG tablet   Yes No   Sig: Take 5 mg by mouth.   fluticasone (FLONASE) 50 MCG/ACT nasal spray   No No   Sig: Administer 2 Sprays into affected nostril(S) every day.   lidocaine (XYLOCAINE) 2 % Solution   No No   Sig: Take 5 mL by mouth as needed for Throat/Mouth Pain (q6hr PRN throat pain, ok to rinse and spit or swallow).   norgestrel-ethinyl estradiol (LO-OVRAL) 0.3-30 MG-MCG Tab   Yes No   Sig: Take 1 Tab by mouth every day.   ondansetron (ZOFRAN ODT) 4 MG TABLET DISPERSIBLE   Yes No   Sig: Take 4 mg by mouth every 8 hours as needed. DISSOLVE 1 TABLET IN MOUTH EVERY 8 HOURS AS NEEDED FOR NAUSEA AND VOMITING   triamcinolone acetonide (KENALOG) 0.1 % Cream   Yes No   Sig: APPLY CREAM EXTERNALLY TO AFFECTED AREA TWICE DAILY      Facility-Administered Medications: None       Physical Exam                             Physical Exam  Vitals and nursing note reviewed.   Constitutional:       General: She is not in acute distress.     Appearance: Normal appearance.   HENT:      Head: Normocephalic  and atraumatic.      Nose: Nose normal.      Mouth/Throat:      Mouth: Mucous membranes are moist.   Eyes:      Extraocular Movements: Extraocular movements intact.      Pupils: Pupils are equal, round, and reactive to light.   Cardiovascular:      Rate and Rhythm: Normal rate and regular rhythm.   Pulmonary:      Effort: Pulmonary effort is normal.      Breath sounds: Normal breath sounds.   Abdominal:      General: Abdomen is flat. There is no distension.      Tenderness: There is no abdominal tenderness.      Comments: Size of abdomen corresponds to 31 weeks pregnancy.   Musculoskeletal:         General: No swelling or deformity. Normal range of motion.      Cervical back: Normal range of motion and neck supple.   Skin:     General: Skin is warm and dry.   Neurological:      General: No focal deficit present.      Mental Status: She is alert and oriented to person, place, and time.   Psychiatric:         Mood and Affect: Mood normal.         Behavior: Behavior normal.       Laboratory:          No results for input(s): ALTSGPT, ASTSGOT, ALKPHOSPHAT, TBILIRUBIN, DBILIRUBIN, GAMMAGT, AMYLASE, LIPASE, ALB, PREALBUMIN, GLUCOSE in the last 72 hours.      No results for input(s): NTPROBNP in the last 72 hours.      No results for input(s): TROPONINT in the last 72 hours.    Imaging:  No orders to display         Assessment/Plan:  Justification for Admission Status  I anticipate this patient is appropriate for observation status at this time because of Mobitz 2 AV block    Patient will need a Telemetry bed on MEDICAL service .  The need is secondary to Mobitz 2 AV block.    Mobitz type 2 second degree AV block  Assessment & Plan  As per outpatient monitoring, 1 episode lasting 2 to 4 seconds  Patient reports occasional lightheadedness, currently asymptomatic  Not on any medications except prenatal vitamins  Plan: Monitor on telemetry  Check TSH  Follow with cardiology recommendations    31 weeks gestation of  pregnancy  Assessment & Plan  , scheduled for  in March 15  OB/GYN consulted  Continue prenatal vitamins        VTE prophylaxis: enoxaparin ppx

## 2023-02-01 NOTE — CARE PLAN
The patient is Stable - Low risk of patient condition declining or worsening    Shift Goals  Clinical Goals: Monitor for Arrythmias  Patient Goals: heart monitoring  Family Goals: n/a    Progress made toward(s) clinical / shift goals:  No arrhythmias noted on telemetry         Problem: Safety  Goal: Free from accidental injury  2/1/2023 0643 by Regis Jaime R.N.  Outcome: Progressing  2/1/2023 0015 by Regis Jaime R.N.  Outcome: Progressing     Problem: Knowledge Deficit  Goal: Patient/Significant other demonstrates understanding of disease process, treatment plan, medications and discharge instructions  2/1/2023 0643 by Regis Jaime R.N.  Outcome: Progressing  2/1/2023 0015 by Regis Jaime R.N.  Outcome: Progressing

## 2023-02-01 NOTE — PROGRESS NOTES
32 y.o  EDC EDC 23 (30w6d)    1146-9931 NST completed (See flowsheet).  precautions discussed with pt. Pt verbalizes understanding of instructions.

## 2023-02-01 NOTE — DISCHARGE PLANNING
Care Transition Team Assessment    LMSW met with pt at bedside to complete assessment. Pt A&Ox4 and able to verify the information on the face sheet. Pt is 31 weeks pregnant and receives prenatal care from Dr. Barros. Pt lives with her  in a single story house. Prior to hospitalization pt was independent at home with ADLs and IADLs. Pt is independent in driving and has a drivers license. Pt does not use any DME at baseline. Pt reported that her  and family are good support for her. Pt works for [employer]. Pt denies any MH or SA. Pt does not have an advance directive, AD packet provided to family.     Information Source  Orientation Level: Oriented X4      Elopement Risk  Legal Hold: No  Ambulatory or Self Mobile in Wheelchair: No-Not an Elopement Risk  Elopement Risk: Not at Risk for Elopement    Interdisciplinary Discharge Planning  Lives with - Patient's Self Care Capacity: Spouse, Child Less than 18 Years of Age  Patient or legal guardian wants to designate a caregiver: No  Support Systems: Family Member(s), Spouse / Significant Other  Housing / Facility: [address]  Durable Medical Equipment: Not Applicable    Discharge Preparedness  What is your plan after discharge?: Home with help  What are your discharge supports?: Spouse         Finances  Financial Barriers to Discharge: No    Vision / Hearing Impairment  Vision Impairment : No  Hearing Impairment : No         Advance Directive  Advance Directive?: None  Advance Directive offered?: AD Booklet given    Domestic Abuse  Have you ever been the victim of abuse or violence?: No  Physical Abuse or Sexual Abuse: No  Verbal Abuse or Emotional Abuse: No  Possible Abuse/Neglect Reported to:: Not Applicable    Psychological Assessment  History of Substance Abuse: None  History of Psychiatric Problems: No    Discharge Risks or Barriers  Discharge risks or barriers?: No    Anticipated Discharge Information  Discharge Disposition: Discharged to  home/self care (01)

## 2023-02-01 NOTE — DISCHARGE SUMMARY
Discharge Summary    CHIEF COMPLAINT ON ADMISSION  No chief complaint on file.      Reason for Admission  Mobitz type2 HB; 31 weeks pregnant     Admission Date  1/31/2023    CODE STATUS  Full Code    HPI & HOSPITAL COURSE  This is a very pleasant 32 y.o. female currently 31 weeks pregnant with a past medical history of paroxysmal atrial fibrillation requiring cardioversion following her first pregnancy who presented as a transfer from outside facility for ongoing cardiac monitoring and abnormal Zio patch that had been done outpatient.  Patient states that that she has had no further issues with atrial fibrillation or cardiac arrhythmias since the 1 episode during her previous pregnancy several years ago however due to this history her OB/GYN recommended she follow-up with cardiology as she had reported some intermittent lightheadedness.  She had a Zio patch done and results were interpreted as a type II second-degree heart block and admission for further evaluation was recommended by her cardiologist that she was sent to [facility] for this evaluation.  On admission patient's vitals are stable she is asymptomatic and overall feeling well.  OB/GYN was consulted and patient and baby are doing well from that perspective.  Telemetry monitoring was done throughout her entire admission which showed normal sinus rhythm without any evidence of ectopy or other arrhythmia.   Zio patch results were faxed and were reviewed by myself and cardiology.  Patient is noted to have AV block noted on 12/26/2022 at approximately 9:44 AM, 1 drop beat was noted however on close examination of the NE interval with calipers prior to and following the dropped beat there does appear to be some NE prolongation suggesting  Mobitz type I and not type II. No further monitoring and or intervention has been recommended by cardiology at this time.   I have discussed plan of care with patient and  and they are comfortable with plan. They were  educated on signs and symptoms that should prompt them to seek medical treatment.     Therefore, she is discharged in fair and stable condition to home with close outpatient follow-up.    The patient recovered much more quickly than anticipated on admission.    Discharge Date  2/1/2023    FOLLOW UP ITEMS POST DISCHARGE  Follow up with cardiology   Electrolytes   Prenatal care     DISCHARGE DIAGNOSES  Principal Problem (Resolved):    Symptomatic bradycardia POA: Yes  Active Problems:    31 weeks gestation of pregnancy POA: Unknown    Second degree heart block POA: Unknown      FOLLOW UP  No future appointments.  Penny Teixeira M.D.  [address]  [address]  [place] [address]  [phone]    Schedule an appointment as soon as possible for a visit in 1 week(s)  hospital follow up    Latesha Mg M.D.  [address] [facility]  [address]  [place] [address]  [phone]    Schedule an appointment as soon as possible for a visit in 2 week(s)        MEDICATIONS ON DISCHARGE     Medication List        CONTINUE taking these medications        Instructions   fluticasone 50 MCG/ACT nasal spray  Commonly known as: FLONASE   Administer 2 Sprays into affected nostril(S) every day.  Dose: 2 Spray     ondansetron 4 MG Tbdp  Commonly known as: ZOFRAN ODT   Take 4 mg by mouth every 8 hours as needed. DISSOLVE 1 TABLET IN MOUTH EVERY 8 HOURS AS NEEDED FOR NAUSEA AND VOMITING  Dose: 4 mg            STOP taking these medications      benzonatate 100 MG Caps  Commonly known as: TESSALON     cetirizine 10 MG Tabs  Commonly known as: ZyrTEC Allergy     lidocaine 2 % Soln  Commonly known as: XYLOCAINE              Allergies  No Known Allergies    DIET  Orders Placed This Encounter   Procedures    Diet Order Diet: Regular     Standing Status:   Standing     Number of Occurrences:   1     Order Specific Question:   Diet:     Answer:   Regular [1]       ACTIVITY  As tolerated.      CONSULTATIONS  Cardiology    OBgyn    PROCEDURES  NA    LABORATORY  Lab Results   Component Value Date    SODIUM 135 02/01/2023    POTASSIUM 3.2 (L) 02/01/2023    CHLORIDE 104 02/01/2023    CO2 20 02/01/2023    GLUCOSE 128 (H) 02/01/2023    BUN 7 (L) 02/01/2023    CREATININE 0.39 (L) 02/01/2023        Lab Results   Component Value Date    WBC 8.6 02/01/2023    HEMOGLOBIN 11.3 (L) 02/01/2023    HEMATOCRIT 34.8 (L) 02/01/2023    PLATELETCT 303 02/01/2023        Total time of the discharge process exceeds 36 minutes.

## 2023-02-01 NOTE — CONSULTS
"Cardiology Initial Consultation    Date of Service  2023    Referring Physician  Melo Conroy M.D.    Reason for Consultation  Heart block    History of Presenting Illness  Sherrell Toro is a 32 y.o. female with a past medical history of PAF who was transferred from [facility] ER 2023 for cardiac monitoring.    The patient is 31 weeks pregnant G2, P1.  She tells me that 4 years ago in 2019 she had an uncomplicated first pregnancy.  She underwent a  and while in the recovery room she developed \"atrial fibrillation\" unresponsive to medical therapy.  She was seen by Juanito Dunne MD [place] cardiologist.  She required cardioversion.  After discharge she had a 30-day heart monitor and followed up with Dr. Dunne and was told that she had brief intermittent episodes of atrial fibrillation lasting only seconds.  She required no medications.  She saw Dr. Dunne later that year for a final follow-up.  At the time of her current pregnancy her OB referred her back to [place] cardiology \"just to be on the safe side\".    She has had no complications with her current pregnancy.  She saw  Latesha Mg MD in 2022.  A routine Zio patch cardiac monitor was done which she had just completed and had a routine follow-up appointment today with Latesha Mg MD to go over the results.  Reportedly the Zio patch showed \"Mobitz type II at 2 to 4 seconds\", no atrial fibrillation.  A routine EKG in the office today reportedly demonstrated \"suspected Mobitz type II heart block\".  The patient was referred to [facility] ER for admission with cardiac monitoring.  EKG at [place] ER showed sinus rhythm with normal intervals.  However because [place] no longer has OB services they declined to admit the patient to the hospital and was transferred to [facility].    The patient has no prior history of heart disease.  She does report intermittently \"seeing " "stars\" but does not describe any specific lightheadedness, dizziness and has had no syncope..  She has continued to work full-time at [employer].  Besides prenatal vitamins she takes no prescription medications.  She attempts to maintain adequate daily hydration.  Denies any alcohol or illicit drug use.  No history of thyroid disease, DVT or pulmonary emboli.    Review of Systems  Review of Systems   Constitutional:  Negative for chills and fever.   HENT:  Negative for congestion.    Respiratory:  Negative for shortness of breath.    Cardiovascular:  Negative for chest pain, palpitations and leg swelling.   Gastrointestinal:  Negative for abdominal pain.   Genitourinary:  Negative for dysuria.   Musculoskeletal:  Negative for myalgias.   Skin:  Negative for rash.   Neurological:  Negative for dizziness.     Past Medical History   has a past medical history of Depression.    Surgical History   has a past surgical history that includes primary c section; other (Right); myomectomy; and ovarian cystectomy (Right).    Family History  No family history of premature heart disease.    Social History   reports that she has never smoked. She has never used smokeless tobacco. She reports that she does not drink alcohol and does not use drugs.    Medications  Prior to Admission Medications   Prescriptions Last Dose Informant Patient Reported? Taking?   JUNEL FE 1/20 1-20 MG-MCG per tablet   Yes No   Sig: Take 1 tablet by mouth.   benzonatate (TESSALON) 100 MG Cap   No No   Sig: Take 1 capsule by mouth 3 times a day as needed for Cough.   cetirizine (ZYRTEC ALLERGY) 10 MG Tab   No No   Sig: Take 1 tablet by mouth every day.   escitalopram (LEXAPRO) 10 MG Tab   Yes No   Sig: Take 10 mg by mouth every day.   escitalopram (LEXAPRO) 5 MG tablet   Yes No   Sig: Take 5 mg by mouth.   fluticasone (FLONASE) 50 MCG/ACT nasal spray   No No   Sig: Administer 2 Sprays into affected nostril(S) every day.   lidocaine (XYLOCAINE) 2 % " Solution   No No   Sig: Take 5 mL by mouth as needed for Throat/Mouth Pain (q6hr PRN throat pain, ok to rinse and spit or swallow).   norgestrel-ethinyl estradiol (LO-OVRAL) 0.3-30 MG-MCG Tab   Yes No   Sig: Take 1 Tab by mouth every day.   ondansetron (ZOFRAN ODT) 4 MG TABLET DISPERSIBLE   Yes No   Sig: Take 4 mg by mouth every 8 hours as needed. DISSOLVE 1 TABLET IN MOUTH EVERY 8 HOURS AS NEEDED FOR NAUSEA AND VOMITING   triamcinolone acetonide (KENALOG) 0.1 % Cream   Yes No   Sig: APPLY CREAM EXTERNALLY TO AFFECTED AREA TWICE DAILY      Facility-Administered Medications: None       Allergies  No Known Allergies    Vital signs in last 24 hours       Physical Exam  Physical Exam  Constitutional:       General: She is not in acute distress.  HENT:      Head: Normocephalic.   Eyes:      General: No scleral icterus.     Conjunctiva/sclera: Conjunctivae normal.      Pupils: Pupils are equal, round, and reactive to light.   Neck:      Thyroid: No thyromegaly.      Vascular: No carotid bruit.      Comments: Normal jugular venous pressure.  Cardiovascular:      Rate and Rhythm: Normal rate and regular rhythm.      Pulses:           Carotid pulses are 1+ on the right side and 1+ on the left side.       Radial pulses are 1+ on the right side and 1+ on the left side.      Heart sounds: S1 normal and S2 normal. No murmur heard.    No friction rub. No gallop.   Pulmonary:      Effort: Pulmonary effort is normal.      Breath sounds: Normal breath sounds. No wheezing, rhonchi or rales.   Abdominal:      General: There is no abdominal bruit.      Palpations: Abdomen is soft. There is no pulsatile mass.      Comments: Gravid   Lymphadenopathy:      Cervical: No cervical adenopathy.   Skin:     General: Skin is warm and dry.      Nails: There is no clubbing.   Neurological:      Mental Status: She is alert and oriented to person, place, and time.   Psychiatric:         Behavior: Behavior normal.       Lab Review  Lab Results  "  Component Value Date/Time    WBC 6.1 07/30/2022 11:02 AM    RBC 4.99 07/30/2022 11:02 AM    HEMOGLOBIN 13.3 07/30/2022 11:02 AM    HEMATOCRIT 41.5 07/30/2022 11:02 AM    MCV 83.2 07/30/2022 11:02 AM    MCH 26.7 (L) 07/30/2022 11:02 AM    MCHC 32.0 (L) 07/30/2022 11:02 AM    MPV 10.7 07/30/2022 11:02 AM      Lab Results   Component Value Date/Time    SODIUM 136 04/12/2021 06:34 PM    SODIUM 141 03/27/2021 08:19 AM    POTASSIUM 3.6 04/12/2021 06:34 PM    POTASSIUM 4.3 03/27/2021 08:19 AM    CHLORIDE 108 (H) 04/12/2021 06:34 PM    CHLORIDE 108 03/27/2021 08:19 AM    CO2 21 04/12/2021 06:34 PM    CO2 23 03/27/2021 08:19 AM    GLUCOSE 87 04/12/2021 06:34 PM    GLUCOSE 93 03/27/2021 08:19 AM    BUN 7 04/12/2021 06:34 PM    BUN 8 03/27/2021 08:19 AM    CREATININE 0.81 04/12/2021 06:34 PM    CREATININE 0.62 03/27/2021 08:19 AM      Lab Results   Component Value Date/Time    ASTSGOT 16 04/12/2021 06:34 PM    ASTSGOT 32 03/27/2021 08:19 AM    ALTSGPT 25 04/12/2021 06:34 PM    ALTSGPT 21 03/27/2021 08:19 AM     Lab Results   Component Value Date/Time    CHOLSTRLTOT 152 03/27/2021 08:19 AM    LDL 94 03/27/2021 08:19 AM    HDL 33 (A) 03/27/2021 08:19 AM    TRIGLYCERIDE 123 03/27/2021 08:19 AM       No results for input(s): NTPROBNP in the last 72 hours.    Cardiac Imaging and Procedures Review  EKG:  My personal interpretation of the EKG dated 1/31/2023 is sinus rhythm    Echocardiogram: 1/[facility]/2023 [facility]  1. The left ventricle is normal in size and systolic function. Ejection fraction of 55-60% by [employer] Biplane. There is normal left ventricular wall thickness. The left ventricular diastolic function is normal. There is normal LV segmental wall motion.   2. mild TR   3. The right ventricle is normal in size and systolic function. Estimated RVSP of 17 mmHg (assuming RAP is 3 mmHg).     Assessment  Abnormal cardiac monitor with reported 2 to 4 seconds of \"Mobitz type II\".  PAF with no recurrence requiring DCCV " "2019 post   Pregnancy, 31 weeks    Recommendation Discussion  The patient currently has a normal cardiovascular examination and recent echocardiogram was normal.  EKG at [facility] was reviewed and is normal with normal conduction intervals.  The Zio patch showing \"Mobitz type II\" is unavailable to review but with normal QRS duration mobitz type II with infra-hisian block seems unlikely.  The patient will be monitored on telemetry.  Recommend repeat EKG and standard laboratory including CMP, CBC and TFTs      Thank you for allowing me to participate in the care of this patient.      Please contact me with any questions.    Ceasar Sharma M.D.   Cardiologist, [facility]  [phone]          "

## 2023-02-01 NOTE — CARE PLAN
Problem: Knowledge Deficit - Standard  Goal: Patient and family/care givers will demonstrate understanding of plan of care, disease process/condition, diagnostic tests and medications  Outcome: Progressing   The patient is Stable - Low risk of patient condition declining or worsening    Shift Goals  Clinical Goals: monitor for arrythmias  Patient Goals: heart monitoring  Family Goals: mal    Progress made toward(s) clinical / shift goals:  patient a+o x 4, denies pain, monitored on tele, vss, no needs at this time, wctm    Patient is not progressing towards the following goals: n/a    Fall precautions/hourly rounding maintained, call light within reach and functioning, all items within reach.  Patient encouraged to call for assistance, poc reviewed with patient, ?'s/concerns answered.

## 2023-02-01 NOTE — PROGRESS NOTES
4 Eyes Skin Assessment Completed by ANKIT Stacy and ANKIT Philip.    Head WDL  Ears WDL  Nose WDL  Mouth WDL  Neck WDL  Breast/Chest WDL  Shoulder Blades WDL  Spine WDL  (R) Arm/Elbow/Hand WDL  (L) Arm/Elbow/Hand WDL  Abdomen Redness to abdomen  Groin WDL  Scrotum/Coccyx/Buttocks WDL  (R) Leg WDL  (L) Leg WDL  (R) Heel/Foot/Toe WDL  (L) Heel/Foot/Toe WDL          Devices In Places Tele Box      Interventions In Place N/A    Possible Skin Injury No    Pictures Uploaded Into Epic N/A  Wound Consult Placed N/A  RN Wound Prevention Protocol Ordered No

## 2023-02-01 NOTE — PROGRESS NOTES
1000 This RN at bedside for NST (see flowsheets). Patient states +FM. Denies LOF, VB, or Ucs.  labor precautions discussed, questions answered.    1037 Dr. Shell phoned and given report.

## 2023-02-01 NOTE — ASSESSMENT & PLAN NOTE
As per outpatient monitoring, 1 episode lasting 2 to 4 seconds  Patient reports occasional lightheadedness, currently asymptomatic  Not on any medications except prenatal vitamins  Plan: Monitor on telemetry  Check TSH  Follow with cardiology recommendations

## 2023-02-01 NOTE — PROGRESS NOTES
Patient to be discharged today - patient aware and agreeable to plan. D/c instructions reviewed with patient - ?'s/concerns answered. 1 IV removed

## 2023-02-01 NOTE — CONSULTS
OB Consult:    CC: heart block in pregnancy    HPI:  Ms. Sherrell Toro is a 32 y.o.  @ 30w6d by lmp c/w 8wk US transferred from [facility] for admission for cardiac monitoring after concern at outpt cardiology visit for heart block.  Pt has hx of myomectomy and hx of 37wk c/s in last pregnancy because of her history of myomectomy. Also has history of Afib after her last delivery requiring cardioversion.     PNC has been with Dr. Meng.   Anatomy US w/ possible ASD, and was referred to [facility].  Seen by [facility] earlier this month with normal fetal ECHO but with limited views.  Otherwise normal anatomy although with mild polyhydramnios 25.2.  Growth normal with EFW 1346g (54%).      Contractions: No   Loss of fluid: No   Vaginal bleeding: No   Fetal movement: +        PNL:  Rh+/-, RI, HIV neg, RPR NR, HBsAg NR, HCV neg, GC/CT neg/neg  1hr 145 --> normal 3hr    ROS:  Const: denies fevers, general concerns  CV/resp: reports no concerns  GI: denies abd pain, GI concerns  : see reports no  HA/vision changes    OB History    Para Term  AB Living   1 1 1         SAB IAB Ectopic Molar Multiple Live Births                    # Outcome Date GA Lbr Addy/2nd Weight Sex Delivery Anes PTL Lv   1 Term 05/15/19 40w0d   F CS-Unspec          GYN: denies STIs, abnl paps    Past Medical History:   Diagnosis Date    Depression        Past Surgical History:   Procedure Laterality Date    MYOMECTOMY      OTHER Right     myoectomy with Rt. cyst removed    OVARIAN CYSTECTOMY Right     PRIMARY C SECTION         Meds: PNV    FamHx: diabetes    Social History     Socioeconomic History    Marital status: Single   Tobacco Use    Smoking status: Never    Smokeless tobacco: Never   Vaping Use    Vaping Use: Never used   Substance and Sexual Activity    Alcohol use: No    Drug use: No       PE:  Vitals:    23 2128   BP:  115/68   Pulse:  72   Resp:  19   Temp:  36.3 °C (97.3 °F)   TempSrc:  Temporal   SpO2:   "96%   Weight: 90.5 kg (199 lb 8.3 oz)    Height: 1.549 m (5' 1\")      gen: AAO, NAD  abd: soft, gravid, NT   Singular annular erythematous lesion on R abdomen  Ext: NT, no edema      A/P: 32 y.o.  @  30w6d by lmp c/w 8wk US w/ hx of c/s x1, hx of myomectomy admitted for cardiac monitoring   - NST q shift, labor RN coming to do this soon.  If stays in sinus rhythm can do qshift monitoring during admission.  If with arrhythmia would desires continuous monitoring, may contact [facility] if needs MFM input for anything during stay  - recommend topical antifungal for suspected ringworm on abdomen      - VTE ppx: lovenox; will need to hold if delivery thought to be needed    Please call OB immediately with arrhythmia or other acute concerns.  Fastest is to call charge RN on L&D who will know who is on for [facility] at any given time.    OB will cont to follow    Trudy Arguello MD  [facility]       @ 2300 NST review:  Indication: maternal arrhythmia  FHT: 130/mod bill/+accels/no decels  [place]: Irritability  Impression: reactive    Trudy Arguello MD  [facility]    "

## 2023-02-01 NOTE — DISCHARGE INSTRUCTIONS
Continue to take your prenatal vitamins   Follow up with your OBgyn for ongoing prenatal care   Recommend follow up with your cardiologist in the next 2 weeks.     Your cardiac monitoring here appeared normal without any indication of pacemaker or there intervention. There was no evidence of heart block, ectopy or other abnormal rhythm. However if you develop any symptoms of dizziness or feeling like you are going to pass out or significant changes in your activity due to exertional weakness, shortness of breath or dizziness, seek medical attention.

## 2023-02-02 NOTE — PROCEDURES
Fetal Non-Stress Test    Date/Time of study: 2/1/2023, 10:00a    Baseline: 140bpm  Variability: moderate  Accelerations: present  Decelerations: absent    [place]: CTX absent    Impression: Reactive NST for GA.    Charlie Shell MD, MS, 2/1/2023

## 2023-02-02 NOTE — PROGRESS NOTES
"  Labor and Delivery Antepartum Note    ID/Primary Dx: 32 y.o. is a  with IUP at 31w0d    HD: 2    Primary Obstetrician: Edgar Meng MD    Assessing Obstetrician: Charlie Shell MD    S: Patient is feeling well today.  She denies lightheadedness or dizziness.  She denies chest pain or shortness of breath.  She endorses normal fetal movement.  She denies leaking of fluid or vaginal bleeding.  She denies contractions.    ROS: 10 systems negative except as noted above.    O: /69   Pulse 89   Temp 36.7 °C (98.1 °F) (Temporal)   Resp 18   Ht 1.549 m (5' 1\")   Wt 90.5 kg (199 lb 8.3 oz)   SpO2 96%   BMI 37.70 kg/m²    Gen: NAD, AAO  HEENT: NC/AT, MMM  Neck: no visible masses  CV: RRR  Pulm: no distress  Abd: Gravid, soft, uterus NTTP, no rebound/guarding  Ext: WWP, 2+ DPP, no edema  Skin: no rash  Neuro: no gross deficits, EOMI  Psy: mood good, affect normal and congruent  Pelvic: SVE deferred    NST: reactive (see separate procedure note)    Recent Labs     23  0035   WBC 8.6   RBC 4.08*   HEMOGLOBIN 11.3*   HEMATOCRIT 34.8*   MCV 85.3   MCH 27.7   RDW 44.6   PLATELETCT 303   MPV 10.5   NEUTSPOLYS 65.20   LYMPHOCYTES 27.80   MONOCYTES 4.80   EOSINOPHILS 1.50   BASOPHILS 0.20       A/P: Sherrell Toro is a 32 y.o.  at 31w0d, with past history of atrial fibrillation admitted for Mobitz type II heart block.  Reactive NST.  *Mobitz type II heart block: The patient has been admitted to telemetry and thus far had shown normal sinus rhythm.  Echocardiogram was within normal limits.  This was discussed in person with the patient's primary medicine attending.  Given this there is intending discharge later today.     -Patient to follow-up with her primary cardiologist as an outpatient   -Discharge planning per primary team  *Fetal wellbeing: Reactive NST today.    -Follow-up as scheduled with Dr. Meng.  *Maternal obesity: Abnormal 1 hour glucose, normal 3-hour glucose, results " reviewed.  *History of  section    MDM: Chart reviewed including review of cardiology specialist consultation, echocardiogram results, NST, and discussion with the patient and her partner as well as with Dr. Shah.    Charlie Shell M.D., 2023, 11:32 PM

## 2023-04-16 ENCOUNTER — OFFICE VISIT (OUTPATIENT)
Dept: URGENT CARE | Facility: CLINIC | Age: 33
End: 2023-04-16
Payer: COMMERCIAL

## 2023-04-16 VITALS
BODY MASS INDEX: 35.87 KG/M2 | SYSTOLIC BLOOD PRESSURE: 124 MMHG | HEIGHT: 61 IN | OXYGEN SATURATION: 95 % | TEMPERATURE: 97.6 F | WEIGHT: 190 LBS | DIASTOLIC BLOOD PRESSURE: 82 MMHG | RESPIRATION RATE: 16 BRPM | HEART RATE: 90 BPM

## 2023-04-16 DIAGNOSIS — H66.004 RECURRENT ACUTE SUPPURATIVE OTITIS MEDIA OF RIGHT EAR WITHOUT SPONTANEOUS RUPTURE OF TYMPANIC MEMBRANE: ICD-10-CM

## 2023-04-16 PROCEDURE — 99213 OFFICE O/P EST LOW 20 MIN: CPT | Performed by: FAMILY MEDICINE

## 2023-04-16 RX ORDER — IBUPROFEN 800 MG/1
TABLET ORAL
COMMUNITY
Start: 2023-03-31 | End: 2023-09-08

## 2023-04-16 RX ORDER — AMOXICILLIN 875 MG/1
875 TABLET, COATED ORAL 2 TIMES DAILY
Qty: 14 TABLET | Refills: 0 | Status: SHIPPED | OUTPATIENT
Start: 2023-04-16 | End: 2023-04-23

## 2023-04-16 ASSESSMENT — FIBROSIS 4 INDEX: FIB4 SCORE: 0.49

## 2023-04-16 ASSESSMENT — ENCOUNTER SYMPTOMS
VOMITING: 0
EYE REDNESS: 0
EYE DISCHARGE: 0
NAUSEA: 0
WEIGHT LOSS: 0
MYALGIAS: 1

## 2023-04-16 NOTE — PROGRESS NOTES
"Subjective     Sherrell Toro is a 32 y.o. female who presents with Otalgia (R ear ) and Pharyngitis            4-5 days right earache. PMH otitis media. Associated nasal congestion. No trauma or barotrauma. No recent swimming. 1 day sore throat.  No fever.  Tolerating fluids with normal urine output.  No known exposures.  No rash.  No cough.  No other aggravating alleviating factors.      Review of Systems   Constitutional:  Positive for malaise/fatigue. Negative for weight loss.   Eyes:  Negative for discharge and redness.   Gastrointestinal:  Negative for nausea and vomiting.   Musculoskeletal:  Positive for myalgias. Negative for joint pain.   Skin:  Negative for itching and rash.            Objective     /82 (BP Location: Left arm, Patient Position: Sitting, BP Cuff Size: Adult)   Pulse 90   Temp 36.4 °C (97.6 °F) (Temporal)   Resp 16   Ht 1.549 m (5' 1\")   Wt 86.2 kg (190 lb)   SpO2 95%   BMI 35.90 kg/m²      Physical Exam  Constitutional:       General: She is not in acute distress.     Appearance: She is well-developed.   HENT:      Head: Normocephalic and atraumatic.      Left Ear: Tympanic membrane normal.      Ears:      Comments: Right TM red dull and bulging.     Mouth/Throat:      Mouth: Mucous membranes are moist.      Pharynx: Posterior oropharyngeal erythema present.   Eyes:      Conjunctiva/sclera: Conjunctivae normal.   Cardiovascular:      Rate and Rhythm: Normal rate and regular rhythm.      Heart sounds: Normal heart sounds. No murmur heard.  Pulmonary:      Effort: Pulmonary effort is normal.      Breath sounds: Normal breath sounds. No wheezing.   Skin:     General: Skin is warm and dry.      Findings: No rash.   Neurological:      Mental Status: She is alert.                           Assessment & Plan        1. Recurrent acute suppurative otitis media of right ear without spontaneous rupture of tympanic membrane  amoxicillin (AMOXIL) 875 MG tablet        Differential " diagnosis, natural history, supportive care, and indications for immediate follow-up were discussed.

## 2023-04-17 ENCOUNTER — OFFICE VISIT (OUTPATIENT)
Dept: OBGYN | Facility: CLINIC | Age: 33
End: 2023-04-17
Payer: COMMERCIAL

## 2023-04-17 DIAGNOSIS — O92.79 LACTATION DISORDER, POSTPARTUM CONDITION: ICD-10-CM

## 2023-04-17 DIAGNOSIS — O92.5 LACTATION SUPPRESSION: ICD-10-CM

## 2023-04-17 DIAGNOSIS — Z91.89 AT RISK FOR POSTPARTUM DEPRESSION: ICD-10-CM

## 2023-04-17 PROCEDURE — 99215 OFFICE O/P EST HI 40 MIN: CPT | Performed by: NURSE PRACTITIONER

## 2023-04-17 NOTE — PROGRESS NOTES
Summary: Baby born at [facility], breastfeeding initially but had lost 10% and offered a bottle that caused choking. Put on an apnea monitor and desaturated. ENT intervened with swallow studies but nothing found. Still desating with feedings, transferred to [facility] and spent 24 days in the NICU with most difficulty bottle feeding. Home for 7 days, mom has introduced breastfeeding with and without NS  Today: Mom latched Lisandra to the right breast, unable to sustain, offered nipple shield but only removed 3ml. With pumping only 9ml total available. To left breast the better  with nipple shield and baby removed 9ml but did gulp but kept up with it. Indications of difficulty managing that faster flow due to higher production. Removed 40% available on that side. Pumped and fed back the 26ml obtained. Content  Plan: If wanting to increase supply will need to increase stimulations at the breast or pump after feeding as he is learning to be more effective at the breast. Offer breast 2-5x/day with or without nipple shield (it is a good bridge to learn breastfeeding). Start on the left the better  and maybe even skip the right as you will be pumping. We are not trying to increase the left production, but get 100% total production. Power pumping not recommended.   Follow up:   Lactation appointment:  Next week  Baby 's Provider appointment: 2 Month Well Child Check   Referrals: Maternal Mental Health and [facility] for baby    Maternal Diagnosis/Problem:  Lactation Disorder- Baby not latching , At risk for PPD, and Lactation Suppression   Infant Diagnosis/Problem:   difficulties feeding at the breast     Subjective:     Sherrell Toro is a 32 y.o. female here for lactation care. She is here today with baby.    Concerns:   Maternal: Latch on difficulties , Feeling that there is not enough milk , Weight check, Infant feeding evaluation, and Breastfeeding questions   Infant: Baby preference for one  breast    HPI:   Pertinent  history: c/section 37 weeks due to cyst and fibroid removed    Mother does not have a history of advanced maternal age, GDM, hypertension prior to pregnancy, GHTN, insulin resistance, multiple gestation, PCOS, thyroid disease, auto immune disease , and breast surgery      History of breast surgeries: No    FEEDING HISTORY:    Previous Breastfeeding History: First baby, did start offering, mom had afib and other family issues and had to stop   Hospital Course: Baby born at [facility], breastfeeding initially but had lost 10% and offered a bottle that caused choking. Put on an apnea monitor and desaturated. ENT intervened with swallow studies but nothing found. Still desating with feedings, transferred to [facility] and spent 24 days in the NICU with most difficulty bottle feeding.   Currently 2023: Home for 7 days, mom has introduced breastfeeding with and without NS     Both breasts: Yes, starting on the right, lesser amoun    Supplement: Expressed breast milk and Formula  Quantity: 40-60ml  How given/devices: Bottle  Bottle/nipple type: Premie and transitional nipple    Nipple Shield Use: 24 mm and recommended by self, when started since home in the last week    Breast Pumping:  Frequency: 6//24 hours  Quantity Obtained: 1/2oz right 1oz left  Type of Pump: Spectra  Flange size/type: 21mm  Wearable: Interested    Maternal ROS:  Constitutional: No fever, chills. Feeling well  Breasts: No soreness of breasts and No soreness of nipples  Psychiatric: Managing ok  Mental Health: No mention of feeling irritable, agitated, angry, overwhelmed, apathetic, appetite changes, exhausted nor having sleep changes outside infant feeds/demands.  Current Outpatient Medications on File Prior to Visit   Medication Sig Dispense Refill    ibuprofen (MOTRIN) 800 MG Tab TAKE 1 TABLET BY MOUTH EVERY 8 HOURS INTERVAL.      amoxicillin (AMOXIL) 875 MG tablet Take 1 Tablet by mouth 2 times a day for 7  days. 14 Tablet 0     No current facility-administered medications on file prior to visit.      Past Medical History:   Diagnosis Date    Depression        Objective:     Maternal Physical Lactation Exam  General: No acute distress  Breasts: Symmetrical , Soft, Plugged Duct - no evidence, and Mastitis  - no S/S  Nipples: intact  Psychiatric: Normal mood and affect. Her behavior is normal. Judgment and thought content normal.  Mental Health: Did NOT exhibit sadness, crying, feeling overwhelmed, agitation or hypervigilance.    Assessment/Plan & Lactation Counseling:     Infant Exam on Infant Chart    Infant Weight History:   3/15/23 6#11.9oz  3/18/23  7#9.3oz  04/17/2023: 8#0.7oz    Infant Intake at Breast: right 3ml      left 9ml     Total: 12ml  Milk Transfer at this feeding:   Ineffective breastfeeding; not able to transfer a full feed from breast r/t learning and dx that kept her in NICU for 3 weeks      Type of Pump: Platinum   Quantity Pumped: L 20    R 6    Total: 26ml  Initiation of Feeding: Infant initiates  Position of Feeding:    Right: cross cradle  Left: football and cross cradle  Attachment Achieved: with difficulty bare breast, easier with NS  Nipple shield:     Size: 24mm       Introduced at home, had used one with her first baby.  Latch achieved? Varied, today milk transferred on the left breast  Difficult Latch Due To:   Position and latch  Oral/motor structure/function inhibited  Learning  Suck Pattern at the Breast: Suck burst and normal rest, Chewing, and Gulping  Suck Pattern on the Bottle:   Suck burst and normal rest  Behavior Following Observed Feeding: content  Nipple Pain: None       Latch: Assisted latch and Latch difficulty without nipple shield  Suckling/Feeding: attaches, baby roots, elicits PLACIDO, frequent pauses, and rhythmic with nipple shield  Sucking strength: Moderate Strong  Sucking Rhythm Coordinated   Compression: WNL    Once latched, baby fell into a mature and fully integrated  SSB pattern on the left, gulping though indicative of still coordinating SSB. On the right baby was sucking fully but not swallowing..   If functional feeding, it is quiet, rhythmic, coordinated, organized, effeicent safe, satisfying and pleasurable for both parent and baby?   No  Milk Supply Available: low and building still    Low Milk Supply:   Likely due to: ineffective or infrequent breast stimulation or milk removal    MATERNAL PERSONALIZED BREASTFEEDING PLAN  Discussed concerns and symptoms as listed above in assessment and guidance summarized below. Shared decision making was used between myself and the family for this encounter, home care, and follow up. Topics reviewed included:   4th Trimester: The 12-week period immediately after mom has had the baby. Not everyone has heard of it, but every mother and their  baby will go through it. It is a time of great physical and emotional change as the baby adjusts to being outside the womb, and the family adjusts to new life as parents  During the fourth trimester, one can expect fussiness and crying from the baby and very likely exhaustion for the family.  babies are learning to adjust to life outside the womb where it was warm and squishy!  There is a lot of misinformation about babies and their needs, and parents are often encouraged to ignore baby's signals. Bad idea. Babies are “half-baked” at birth and have much to learn with the help of physical and emotional support from caregivers. Taking care of a baby's needs is an investment that pays off with a happier, healthier child and adult.  It can take weeks or even months for your body to feel totally normal again. There is a major hormonal upheaval experienced by moms in the first few weeks after birth, because their bodies are shifting from many pregnancy hormones to a more normal hormonal make-up.  These first three months with baby earth side is a delicate time. Honor it with a mindful dose of  "support. Mindful Mamma's is an qing that may help.   Self-Compassion through Relational Support and Interaction.   Be kind to ourself. This is the first step in reducing anxiety and depression. Pay attention to how you are doing.   What do you need? Food, Rest, Sleep, Support, to Laugh/giggle: who will you ask today? They want to help you. We want to help you.   How do you stop your self-blame, or your self-criticism?   Get out of the house each day, walk or drive somewhere or ask a friend to drive you,  a \"treat\" at a drive through.   Mood and Anxiety Disorders: Having a new baby can be joyful time for some new moms, but a difficult time for others. For all, it is a time of profound physical and emotional change. Balancing baby, family, partners and friends, work, pets, and preexisting or new life stressors as well as sleep deprivation can be extremely challenging. Untreated depression, sleep exhaustion, and anxiety are each a challenge that must be addressed and in addition can contribute to early cessation of breastfeeding. It is important both for the mental health and physical health of new moms and babies to get on the path to feeling better and if therapeutic support is needed, specific resources are available, please ask.  Referred 2023  Triple Feeding and its sustainability and its impact on the mother baby relationship  Sleep or Lack of: Human Giver Syndrome (moms) tells us it’s “self-indulgent” to rest and sleep, which makes as much sense as believing it’s weak or self indulgent to breathe. We discussed strategies to manage restorative sleep, although short amounts, significant to the mental health of the mother. The principle follows:  Mom goes to sleep right after 8pm +/- feeding  Partner covers first late evening feeding (10pm/11pm), settles baby,  then goes to bed  Mom covers next feeding 1am /2am, partner sleeps  Next feeding share  Milk Supply is dependent on glandular tissue " development, hormonal influences, how many times the baby removes milk and how well the breasts are emptied in a 24 hour period. This is a biological reality that we can NOT work around. If, for any reason, your baby is not latching, or you are not able to nurse, then it is important for you to remove the milk instead by pumping or hand expression.  There's no magic trick, tea, food, drink, cookie or supplement that will increase your milk supply. One  must  effectively remove milk to continue to make and maximize milk. In the early days and weeks that can be 8+ times in 24 hours. For older babies, on average 6-7 + times in 24 hours.    Hydration: Staying hydrated is important however lack of hydration is usually not a cause of significantly low milk production.  Everyone needs a different amount of water, depending on their activity and diet. A high salt and/or high-protein diet, high physical activity, or very warm weather/sweating will require more fluids. A person eating a diet high in veggies and fruit, with a lack of physical activity will require fewer fluids. There is no magic number for the # of ounces of water each day.The best way to know that you are well hydrated is by looking at your urine.  Urine should look clear to light pale yellow, and you should need to pee at least every 3 to 4 hours unless you have a large bladder capacity.  Low Milk Supply: [place] births 3.6 million, 5-15% chronic insufficient production, 180,000- 500,000 individuals - not as rare as we think (Hailey et al, 2021).   There's unfortunately countless other reasons why these moms have initial and potentially prolonged hypogalactia (low milk supply) (depending on their health baseline, how quickly they got lactation intervention etc).  Most commonly, it's separation from baby/delayed initiation of breast stimulation and massive fluid overload (particularly with hemorrhage) that results in a lot of interstitial edema (particularly in the  breast).  Your common factors:  Lack of nipple/breast stimulation (Baby at the breast but not suckling, mostly non nutritive sucking)  Lack of breast emptying (Baby at the breast but no removing much milk)  Herbs and Medications: A galactagogue is an herb or medication taken by a breastfeeding mother to increase her milk supply. We know that for centuries mothers around the world have sought out remedies to increase their milk supply. However, there is limited research on the safety and effectiveness of herbal galactagogues, which makes it hard for us to endorse them. It is not known if any of these herbs are truly effective, and it is difficult to predict how a specific herbal galactagogue will affect an individual, requiring “trial and error” in many situations. When effective, results are generally seen within 24-72 hours of starting an herbal galactagogue. Many of these herbs are used to decrease high blood sugar. If you are diabetic or have problems with low blood sugar, or thyroid disease you may not be a candidate for herbs. Not all women can increase their low milk supply with a galactagogue due to the many underlying causes of low milk production.  When taking a galactagogue, remember that frequent milk removal is still the most effective way to increase supply.   Feeding:   Infant feeding well given current interval growth, guidelines to follow:  Feed your baby every 1.5-3 hours, more often if baby acts hungry.   Awaken baby for feeding if going over 3 hours in the day.    Daily goal: 8-12 feedings per 24 hours.    Given infants weight you may allow baby to go longer at night but that generally means shorter durations in the day.  Supplement:   Supplement with expressed milk  Supplement with formula   Proper powder formula preparation: https://www.cdc.gov/nutrition/downloads/prepare-store-powered-ighsbl-htlejqv-341.pdf.   For babies under 2 months:  https://www.cdc.gov/cronobacter/infection-and-infants.html  Pacing the feeding:  A slow flow nipple helps, but how you feed the baby is more important.  How you are  positioning the bottle can compensate for a faster flow nipple.  When bottle-feeding, the baby should control how much is consumed at a feeding.  Holding the baby in an upright position with the bottle horizontal ensures that the baby gets milk only when sucking.  Here is a nice video demonstrating this concept of paced bottle feeding,  https://www.youGeckoboardube.com/watch?v=IjPUF7pUX6D Think baby led, not parent led.  Pacifier Use:  The American Academy of Pediatrics' Position Paper reports: Although we recommend a conservative approach regarding pacifier use, we do not endorse a complete ban on the use of pacifiers, nor do we support an approach that induces parental guilt concerning their choices about the use of pacifiers. Pacifier use and breastfeeding in term and  newborns- a systematic review and meta-analysis from the  J of Pediatrics Published online 2022. Has found that when pacifiers are used among individuals who have been counseled on the risks, do not interfere with breastfeeding exclusivity or duration. These are parental choices.   Nipple shield (NS): We prefer the 24mm Medela.   Before applying, roll the shield in on itself (like a sombrero, and allow breast to be pulled  in to the shield tip).   The latch is very different from the bare breast, bring the baby to you and let them find the nipple shield and they will manage it, tuck them close once they find it, cheek against breast.   Once you and your baby are familiar with the NS, you may be able to just put it on the breast and latch the baby without any preparation.  Positioning Techniques for Bare Breast  Pillow used: Tarah Bowman  Suggested positions Cross cradle and Football  Fine tune position by making sure your fingers beneath the breast as well as your  bra, are out of the way of your baby's chin.  Positioning: See http://globalhealthmedia.org/portfolio-items/positions-for-breastfeeding/?auldjvffyOT=07840  Latch on Techniques for Bare Breast.   Modify for nipple shield use soon enough you will move back to bare breast.  Aim is an asymmetric deep latch.  First make yourself comfortable. Sit with knees bent (unless lying down), feet on a stool if needed. You will support your baby, and pillows will be used to support YOU. You want your baby's belly facing your breast/body (belly to belly). If your baby is extremely fussy and crying, do skin-to-skin for a while until he or she calms down, then try (or try again).   Fine tune latch:  By holding your baby more securely at the breast, assisting your baby to stay attached by:  Support your baby with your hand behind the shoulder blades (NOT THE HEAD).  1. Align your baby's NOSE with your nipple  2. Your baby's chin should be the first part of his or her body to touch your breast  3. Tickle the baby's upper lip or nose with your nipple  4. When your baby's mouth is WIDE OPEN, swiftly bring your baby's mouth over your nipple/areola.  Steps 2 and 3 could be slightly reversed order or essentially happening at the same time.  Bringing your baby to your breast, not breast to the baby  Your baby's cheek to touch breast securely, nose tipped back  Hold your baby firmly in place so when your baby forgets to suck and picks it back up again your baby is in the correct spot. You will be extinguishing behavior and replacing it with a deeper latch to stimulate suck and provide satisfaction at the breast.  Your baby needs as much breast as deep in the mouth as possible to allow your nipples to heal and for you more importantly to maximize efficiency at the breast  If that doesn’t help, you should make an appointment with me to re-evaluate.   Latch is asymmetrical, leading with the chin, getting the baby below the breast, as if offering  "a large \"deli irving sandwich\".  Here is a video from JANAE on the asymmetric latch       https://www.youtube.com/watch?v=0I-NYt5Oz83  Triple Feeding:  Things can change on a daily basis in the first few weeks, and feeding plans may need to adapted to protect both mom’s mental health and infant caloric intake.  The alternative to triple feeding is what I call “divide and conquer” or 'double feeding\":  Mom focuses on pumping to stimulate her breasts and remind the breasts that even if the baby got sleepy and behind in calories, they do in fact need to produce milk.  This is one of the  times where a pump is useful and medically indicated (aside from NICU, or other separation of mom and infant).  Dad, partner, or other relative focuses on getting calories in the baby.    It is OK to give a bottle at this point.  Nipple confusion” in the setting of low milk production is actually more of “flow preference.”  Babies will either fuss at a breast without enough milk, or fall asleep.  Have mom put baby to breast at times when her milk flow should be higher (in the early morning hours; even moms with plenty of milk will have less in the afternoon and evening).  This is simply to maintain baby’s familiarity with mom’s breast.  The baby can suck for comfort, but it is necessary to ensure the baby continues to take in adequate volume from the bottle.  Over time, mom can gradually notice the baby swallowing more and the breast and being more satisfied.  However, this does not happen immediately and some moms have to continue supplementing for the duration of breastfeeding.  Like all of parenting, breastfeeding is a MARATHON and NOT a sprint.  It is much better to give a baby a bottle to sustain early breastfeeding challenges than become utterly exhausted with triple feeding and then give up.    \"Double feeding\" as baby prepares for more robust feeding sessions  If triple feeing, FIRST decide what you can do at that feeding and if " you decide to double feed -pump and bottle, that is sufficient.  If you are going to offer the breast at that feeding:  nurse first and limit time on the breasts to 20+/- min total  finish with bottle  pump afterwards to finish emptying your breasts which will help increase milk supply  As baby becomes more effective, evidenced by not pumping much milk after a breastfeeding, we will create a plan to decrease pumping.  Use your own pumped milk, donor human milk, or formula.  Pumping Guidelines:  Both breasts   Sustaining a healthy baseline prolactin level is vital- this means feeding/pumping at least every 3 hours with no more than a 5 hour break at night.     Pump 8 times in 24 hours  Type of Pump:  Spectra  Spectra Settings  Press letdown button when first starting         Cycle: 70 / Vacuum: L1 - L 5 (To comfort)  Once milk lets down, press letdown button again  Cycle: 54 / Vacuum: L1 - L12 (To comfort) 6-8L  How long will vary woman to woman, typically 8-15 minutes, or 1-2 minutes after flow slows  Flange Fit: Freely moving nipple in the tunnel with some movement of the areola.  Today's evaluation indicates a flange change would be appropriate mom using 21mm but may be too tight causing her to decrease suction at pump  Storage (Acceptable guidelines for healthy term babies)  10 hours at room temp including your pieces, may rinse but not mandatory  8 days refrigerator, don't need  to refrigerate right away if using fresh pumped milk at the next feeding  Freezer 1 year  Deep freezer 2 years  American Academy or Pediatrics has said you may mix different temperature milks.   If baby drinks breast milk from a fresh or refrigerated bottle of breast milk,  you may return the unused portion to the refrigerator and use once at next feeding.   Increasing supply besides Galactogogue's and Pumping:  Warmth  Relaxation   Physical, auditory narratives  Childbirth relaxation Techniques  Acupuncture and acupressure  Shaquille  Indian [email or web address]   [place] [email or web address]  Nipple care:    May apply breastmilk  Moist-oily ointment after feeding/pumping, ie Lanolin nipple butter, coconut or olive oil, if desired/needed 2-3 times/day until nipples are healed  You do not need to wash this off before pumping or feeding the baby    Answers to FAQs: https://www.infantrisk.com/category/breastfeeding  Alcohol & Breastfeeding: What's your time-to-zero?  Cough & Cold Medications while Breastfeeding  Vitamin D Supplementation and Breastfeeding  Antidepressant Use While Breastfeeding: What should I know?  Breastfeeding, Caffeine, and Energy Drinks    Connect with other mothers:  Breastfeeding Gaston including opportunity to weight your baby and do before and after weights WEIGHT CHECKS  Tuesdays 10am - 11am. [facility] at [address] [place] [address], [address] [place] [address], 3rd floor conference room  Check your baby's weight, do a feeding and see how your baby is growing, visit with other mothers, plan on a walk or coffee date after group.  Please download Growth: Baby and Child for Apple or Child Growth Tracker for [employer] if you would like to  document and follow your baby's growth curve.  Please wear a mask coming and going: you may remove it in the classroom  Due to space limitations - limit strollers please (New c/section moms please use your stroller).  We would love to have dads stay, but moms won't breastfeed if there are men in the room, sorry.  The room is generally scheduled for another event following group.  Please take all diapers with you   ONLINE SUPPORT GROUPS  Postpartum Support International (PSI) support groups are conducted using a [id number] support model and are not intended for those experiencing a mental health crisis. Groups are 90 minutes (1.5 hours) in length. The first ~30 minutes is providing information, education, and establishing group guidelines. The next ~60 minutes is “talk time,” in  which group members share and talk with each other. Group members must be present for the group guidelines before joining in the discussion or “talk time.”     In Conclusion:   Managing breastfeeding and milk supply is very dynamic,can be a complex and intimate journey, and is not one size fits all. When obstacles present themselves, it takes confidence, persistence and support. The rights of the child include optimal nutrition and mothers need help to make informed decisions. You  and your baby have been screened for biological, psychological, and social risk factors that might interfere with achieving your goals.  Support is critical. We want the family thriving not just tolerating life. You are now the focus of our Breastfeeding Medicine team; we are here to support your decisions and vision.     Follow up requires close monitoring in this time sensitive window of opportunity to establish milk supply and facilitate the learning of  breastfeeding.    Please call [phone] our voicemail line or the front office at [phone] to scheduled your next appointment.  Family is encouraged to e-mail or mychart us to update how the plan is working.    A total of 100 minutes, not including infant assessment time, with more than 50% was spent preparing to see the patient, obtaining and reviewing separately obtained history, performing a medically appropriate examination and evaluation, counseling and educating the family, referring and communicating with other health care professionals, documenting clinical information in the electronic health record, independently interpreting weighted feeds and infant growth results, communicating these results to the family and care coordination as detailed in the above note.       ROQUE Heller.

## 2023-04-25 ENCOUNTER — OFFICE VISIT (OUTPATIENT)
Dept: OBGYN | Facility: CLINIC | Age: 33
End: 2023-04-25
Payer: COMMERCIAL

## 2023-04-25 DIAGNOSIS — O92.5 LACTATION SUPPRESSION: ICD-10-CM

## 2023-04-25 DIAGNOSIS — O92.70 LACTATION PROBLEM: ICD-10-CM

## 2023-04-25 PROCEDURE — 99215 OFFICE O/P EST HI 40 MIN: CPT | Performed by: NURSE PRACTITIONER

## 2023-04-25 NOTE — PROGRESS NOTES
"Summary: Mom has been nursing more exclusively, weaning formula down to 2oz and less pumping. Baby growth slowed to 0.5oz per day. Mom had an ear infection treated and her mother readmitted to the hospital. Her insurance does not cover mental health. Today: Mom latched Lisandra to the left bare breast, the more productive one and Lisandra removed 48ml of the 68ml available. The second let down took a little longer but Lisandra worked for it and did well. She does \"squeak\" and gulp with breastfeeding, much less with paced bottle feeding. She went to the right breast and moved 9ml of the 30ml available.  Removed 59% available on that side, improvement from last week. Pumped 40ml and fed back the 20ml  of formula. Content  Plan: Wrote out plan of pumping/bottle every other feeding then breastfeeding. Bottle time 2.5-3oz, breast can top off if baby indicates, increasing bottle volumes to meet intake goals. Emptying breast to increase supply.  Follow up:   Lactation appointment:  Next week  Baby 's Provider appointment: 2 Month Well Child Check   Referrals: Maternal Mental Health and [facility] for baby  2023  [facility] ordered from NICU, and reordered as she had not heard from them.     Maternal Diagnosis/Problem:  Lactation problem- Baby not transferring all milk, At risk for PPD, and Lactation Suppression   Infant Diagnosis/Problem:   difficulties feeding at the breast     Subjective:     Sherrell Toro is a 32 y.o. female here for lactation care. She is here today with baby.    Concerns:   Maternal: Latch on difficulties , Feeling that there is not enough milk , Weight check, Infant feeding evaluation, and Breastfeeding questions   Infant: squeaking with sucking    HPI:   Pertinent  history: c/section 37 weeks due to cyst and fibroid removed    Mother does not have a history of advanced maternal age, GDM, hypertension prior to pregnancy, GHTN, insulin resistance, multiple gestation, PCOS, thyroid disease, auto " immune disease , and breast surgery      History of breast surgeries: No    FEEDING HISTORY:    Previous Breastfeeding History: First baby, did start offering, mom had afib and other family issues and had to stop   Hospital Course: Baby [facility] at [facility], breastfeeding initially but had lost 10% and offered a bottle that caused choking. Put on an apnea monitor and desaturated. ENT intervened with swallow studies but nothing found. Still desating with feedings, transferred to [facility] and spent 24 days in the NICU with most difficulty bottle feeding.   Prior to consultation 4/17/2023: Home for 7 days, mom has introduced breastfeeding with and without NS   Currently 4/25/2023 Mom has been nursing more exclusively, weaning formula down to 2oz and less pumping. Baby growth slowed to 0.5oz per day. Mom had an ear infection treated and her mother readmitted to the hospital. Her insurance does not cover mental health.   Both breasts: Yes    Supplement: Expressed breast milk and Formula  Quantity: varies, about 10oz pumped milk per day and 2oz formula  How given/devices: Bottle  Bottle/nipple type: Level 1 now    Nipple Shield Use: Not using now    Breast Pumping:  Frequency: varies//24 hours  Quantity Obtained: 1.5oz  Type of Pump: Spectra  Flange size/type: 21mm has 17 and 19mm  Wearable: Interested    Maternal ROS:  Constitutional: No fever, chills. Feeling well  Breasts: No soreness of breasts and No soreness of nipples  Psychiatric: Managing ok  Mental Health: No mention of feeling irritable, agitated, angry, overwhelmed, apathetic, appetite changes, exhausted nor having sleep changes outside infant feeds/demands.  Current Outpatient Medications on File Prior to Visit   Medication Sig Dispense Refill    ibuprofen (MOTRIN) 800 MG Tab TAKE 1 TABLET BY MOUTH EVERY 8 HOURS INTERVAL.       No current facility-administered medications on file prior to visit.      Past Medical History:   Diagnosis Date    Depression         Objective:     Maternal Physical Lactation Exam  General: No acute distress  Breasts: Symmetrical , Soft, Plugged Duct - no evidence, and Mastitis  - no S/S  Nipples: intact  Psychiatric: Normal mood and affect. Her behavior is normal. Judgment and thought content normal.  Mental Health: Did NOT exhibit sadness, crying, feeling overwhelmed, agitation or hypervigilance.    Assessment/Plan & Lactation Counseling:     Infant Exam on Infant Chart    Infant Weight History:   3/15/23 6#11.9oz  3/18/23  7#9.3oz  4/17/23: 8#0.7oz  4/25/2023  8#4.7oz    Infant Intake at Breast: Left 21ml +27ml (48ml  right 9ml   Total: 57ml  Milk Transfer at this feeding:   Much improved effectiveness, not fully yet but substantial increase in a week with no nipple shield    Type of Pump: Platinum   Quantity Pumped: L 20    R 20    Total: 40ml  Initiation of Feeding: Infant initiates  Position of Feeding:    Right: football  Left: football and cross cradle  Attachment Achieved: Independent but assisted to show more supported latch  Nipple shield:     None now  Difficult Latch Due To:   Position and latch  Oral/motor structure/function inhibited  Learning  Suck Pattern at the Breast: Suck burst and normal rest, Chewing, and Gulping  Suck Pattern on the Bottle:   Suck burst and normal rest  Behavior Following Observed Feeding: content  Nipple Pain: None     Latch: Assisted latch and Latch difficulty without nipple shield  Suckling/Feeding: attaches, baby roots, elicits PLACIDO, frequent pauses, and rhythmic with nipple shield  Sucking strength: Moderate Strong  Sucking Rhythm Coordinated   Compression: WNL    Once latched, baby fell into a more mature and integrated SSB pattern although squeaking and gulping  indicative of still coordinating SSB.   If functional feeding, it is quiet, rhythmic, coordinated, organized, effeicent safe, satisfying and pleasurable for both parent and baby?   Not yet  Milk Supply Available: building still    Low Milk  Supply:   Likely due to: ineffective or infrequent breast stimulation or milk removal    MATERNAL PERSONALIZED BREASTFEEDING PLAN  Discussed concerns and symptoms as listed above in assessment and guidance summarized below. Shared decision making was used between myself and the family for this encounter, home care, and follow up. Topics reviewed included:   4th Trimester: The 12-week period immediately after mom has had the baby. Not everyone has heard of it, but every mother and their  baby will go through it. It is a time of great physical and emotional change as the baby adjusts to being outside the womb, and the family adjusts to new life as parents  During the fourth trimester, one can expect fussiness and crying from the baby and very likely exhaustion for the family. [place] babies are learning to adjust to life outside the womb where it was warm and squishy!  There is a lot of misinformation about babies and their needs, and parents are often encouraged to ignore baby's signals. Bad idea. Babies are “half-baked” at birth and have much to learn with the help of physical and emotional support from caregivers. Taking care of a baby's needs is an investment that pays off with a happier, healthier child and adult.  It can take weeks or even months for your body to feel totally normal again. There is a major hormonal upheaval experienced by moms in the first few weeks after birth, because their bodies are shifting from many pregnancy hormones to a more normal hormonal make-up.  These first three months with baby earth side is a delicate time. Honor it with a mindful dose of support. Mindful Mamma's is an qing that may help.    Mood and Anxiety Disorders: Having a new baby can be joyful time for some new moms, but a difficult time for others. For all, it is a time of profound physical and emotional change. Balancing baby, family, partners and friends, work, pets, and preexisting or new life stressors  as well as sleep deprivation can be extremely challenging. Untreated depression, sleep exhaustion, and anxiety are each a challenge that must be addressed and in addition can contribute to early cessation of breastfeeding. It is important both for the mental health and physical health of new moms and babies to get on the path to feeling better and if therapeutic support is needed, specific resources are available, please ask.  Referred 4/17/2023 but insurance doesn't cover, will look for supported counseling and share  Triple Feeding and its sustainability and its impact on the mother baby relationship  Milk Supply is dependent on glandular tissue development, hormonal influences, how many times the baby removes milk and how well the breasts are emptied in a 24 hour period. This is a biological reality that we can NOT work around. If, for any reason, your baby is not latching, or you are not able to nurse, then it is important for you to remove the milk instead by pumping or hand expression.  There's no magic trick, tea, food, drink, cookie or supplement that will increase your milk supply. One  must  effectively remove milk to continue to make and maximize milk. In the early days and weeks that can be 8+ times in 24 hours. For older babies, on average 6-7 + times in 24 hours.    Low Milk Supply: [place] births 3.6 million, 5-15% chronic insufficient production, 180,000- 500,000 individuals - not as rare as we think (Hailey et al, 2021).   There's unfortunately countless other reasons why these moms have initial and potentially prolonged hypogalactia (low milk supply) (depending on their health baseline, how quickly they got lactation intervention etc).  Most commonly, it's separation from baby/delayed initiation of breast stimulation and massive fluid overload (particularly with hemorrhage) that results in a lot of interstitial edema (particularly in the breast).  Your common factors:  Medical history  Lack of breast  "emptying (Baby at the breast but no removing much milk)  Feeding:   Infant feeding of concern given current interval growth, guidelines to follow:  Specific plan in writing sent home with mom including breast 4x/day, pumping 4x/day and bottles of 2.5-3oz four times per day  Supplement:   Supplement with expressed milk  Supplement with formula   Proper powder formula preparation: https://www.cdc.gov/nutrition/downloads/prepare-store-powered-dshaih-vabjkhc-026.pdf.   For babies under 2 months: https://www.cdc.gov/cronobacter/infection-and-infants.html  Pacing the feeding:  A slow flow nipple helps, but how you feed the baby is more important.  How you are  positioning the bottle can compensate for a faster flow nipple.  When bottle-feeding, the baby should control how much is consumed at a feeding.  Holding the baby in an upright position with the bottle horizontal ensures that the baby gets milk only when sucking.  Here is a nice video demonstrating this concept of paced bottle feeding,  https://www.Z2.com/watch?v=XzDHZ0wYP5O Think baby led, not parent led.  Latch on Techniques for Bare Breast.   Aim is an asymmetric deep latch.  Bringing your baby to your breast, not breast to the baby  Your baby's cheek to touch breast securely, nose tipped back  Hold your baby firmly in place so when your baby forgets to suck and picks it back up again your baby is in the correct spot. You will be extinguishing behavior and replacing it with a deeper latch to stimulate suck and provide satisfaction at the breast.  Your baby needs as much breast as deep in the mouth as possible to allow your nipples to heal and for you more importantly to maximize efficiency at the breast  If that doesn’t help, you should make an appointment with me to re-evaluate.   Latch is asymmetrical, leading with the chin, getting the baby below the breast, as if offering a large \"deli irving sandwich\".  Here is a video from JANAE on the asymmetric " "bulmaro       https://www.youNanoLumensube.com/watch?v=0I-TOt8Ik05  Triple Feeding:  Things can change on a daily basis in the first few weeks, and feeding plans may need to adapted to protect both mom’s mental health and infant caloric intake.  The alternative to triple feeding is what I call “divide and conquer” or 'double feeding\":  Mom focuses on pumping to stimulate her breasts and remind the breasts that even if the baby got sleepy and behind in calories, they do in fact need to produce milk.  This is one of the  times where a pump is useful and medically indicated (aside from NICU, or other separation of mom and infant).  Dad, partner, or other relative focuses on getting calories in the baby.    Like all of parenting, breastfeeding is a MARATHON and NOT a sprint.  It is much better to give a baby a bottle to sustain early breastfeeding challenges than become utterly exhausted with triple feeding and then give up.    \"Double feeding\" as baby prepares for more robust feeding sessions  If triple feeing, FIRST decide what you can do at that feeding and if you decide to double feed -pump and bottle, that is sufficient.  If you are going to offer the breast at that feeding:  nurse first and limit time on the breasts to 20+/- min total  finish with bottle  pump afterwards to finish emptying your breasts which will help increase milk supply  As baby becomes more effective, evidenced by not pumping much milk after a breastfeeding, we will create a plan to decrease pumping.  Use your own pumped milk, donor human milk, or formula.  Pumping Guidelines:  Both breasts   Sustaining a healthy baseline prolactin level is vital- this means feeding/pumping at least every 3 hours with no more than a 5 hour break at night.   Pump 4 times in 24 hours  Type of Pump:  Spectra  Spectra Settings  Press letdown button when first starting         Cycle: 70 / Vacuum: L1 - L 5 (To comfort)  Once milk lets down, press letdown button again  Cycle: 54 " / Vacuum: L1 - L12 (To comfort) 6-8L or higher only to comfort  How long will vary woman to woman, typically 8-15 minutes, or 1-2 minutes after flow slows  Flange Fit: Freely moving nipple in the tunnel with some movement of the areola.  Today's evaluation indicates a flange change would be appropriate 25mm too large for the left, may work fine on the right. Mom has ordered 17 and 19mm  Storage (Acceptable guidelines for healthy term babies)  10 hours at room temp including your pieces, may rinse but not mandatory  8 days refrigerator, don't need  to refrigerate right away if using fresh pumped milk at the next feeding  Freezer 1 year  Deep freezer 2 years  American Academy or Pediatrics has said you may mix different temperature milks.   If baby drinks breast milk from a fresh or refrigerated bottle of breast milk,  you may return the unused portion to the refrigerator and use once at next feeding.   Increasing supply besides [place]'s and Pumping:  Warmth  Relaxation   Physical, auditory narratives  Childbirth relaxation Techniques  Acupuncture and acupressure  [facility] [email or web address]   [email or web address] [email or web address] | VIP/  Connect with other mothers:  Breastfeeding [place] including opportunity to weight your baby and do before and after weights WEIGHT CHECKS  Tuesdays 10am - 11am. Women's Health at [address] [place] [address], [facility] [address] [place] [address], 3rd floor conference room  Check your baby's weight, do a feeding and see how your baby is growing, visit with other mothers, plan on a walk or coffee date after group.  Please download Growth: Baby and Child for Apple or Child Growth Tracker for SmarterShade if you would like to  document and follow your baby's growth curve.  Please wear a mask coming and going: you may remove it in the classroom  Due to space limitations - limit strollers please (New c/section moms please use your stroller).  We would love to have dads stay, but moms won't breastfeed  if there are men in the room, sorry.  The room is generally scheduled for another event following group.  Please take all diapers with you   ONLINE SUPPORT GROUPS  [employer] (PSI) support groups are conducted using a hzgh-sn-lcnb support model and are not intended for those experiencing a mental health crisis. Groups are 90 minutes (1.5 hours) in length. The first ~30 minutes is providing information, education, and establishing group guidelines. The next ~60 minutes is “talk time,” in which group members share and talk with each other. Group members must be present for the group guidelines before joining in the discussion or “talk time.”     Follow up requires close monitoring in this time sensitive window of opportunity to establish milk supply and facilitate the learning of  breastfeeding.    Please call [phone] our voicemail line or the front office at [phone] to scheduled your next appointment.  Family is encouraged to e-mail or mychart us to update how the plan is working.    A total of 80 minutes, not including infant assessment time, with more than 50% was spent preparing to see the patient, obtaining and reviewing separately obtained history, performing a medically appropriate examination and evaluation, counseling and educating the family, referring and communicating with other health care professionals, documenting clinical information in the electronic health record, independently interpreting weighted feeds and infant growth results, communicating these results to the family and care coordination as detailed in the above note.       ROQUE Heller.

## 2023-05-02 ENCOUNTER — APPOINTMENT (OUTPATIENT)
Dept: OBGYN | Facility: CLINIC | Age: 33
End: 2023-05-02
Payer: COMMERCIAL

## 2023-05-08 ENCOUNTER — OFFICE VISIT (OUTPATIENT)
Dept: OBGYN | Facility: CLINIC | Age: 33
End: 2023-05-08
Payer: COMMERCIAL

## 2023-05-08 DIAGNOSIS — O92.70 LACTATION PROBLEM: ICD-10-CM

## 2023-05-08 DIAGNOSIS — O92.5 LACTATION SUPPRESSION: ICD-10-CM

## 2023-05-08 PROCEDURE — 99215 OFFICE O/P EST HI 40 MIN: CPT | Performed by: NURSE PRACTITIONER

## 2023-05-08 NOTE — PROGRESS NOTES
"Summary: Mom chose to increase breastfeeding, less pumping except mom seng on the less producing side and baby do more breastfeeding. Baby is very content but only gained 5.3oz in 13 days, pooping once in the last 2 days. No formula. Anything mom pumps is given back to her daughter. Baby developed a cold 3 days ago, no fever, but congested. Has [facility] appt this week, and returns to work this week.   Today: Mom latched Lisandra to the left bare breast, the more productive one and Lisandra removed 24ml of the 27ml available.  She does \"squeak\" still with some of the suck, she no longer gulps. . She went to the right breast and moved 3ml of the 6ml available.  Removed over 90% of the available milk which is an improvement from  2 weeks ago, but without pumping and topping off supply has fallen and baby not gaining. . Fed back the 6ml easily, Content/sleeping  Plan: Wrote out plan with 2 options. One of pumping/bottle every other feeding then breastfeeding topping off every other feeding. Option 2 bottle one feeding after/before pumping and next breast and top off, no pumping.  Top offs always offer 2-3oz until you determine her needs. Goals are Lisandra's growth, increasing supply and continuing breastfeeding  Follow up:   Lactation appointment: Weight check end of this week  Baby 's Provider appointment: 2 Month Well Child Check  Next week  Referrals: Maternal Mental Health and [facility] for baby  2023  [facility] appointment this week and Maternal Mental Health provider is private pay only.     Maternal Diagnosis/Problem:  At risk for PPD, and Lactation Suppression   Infant Diagnosis/Problem:   difficulties feeding at the breast   Slow infant growth less than 0.5oz per day since 3/18/23    Subjective:     Sherrell Toro is a 32 y.o. female here for lactation care. She is here today with baby.    Concerns:   Maternal: Weight check, Infant feeding evaluation, and Breastfeeding questions   Infant: squeaking with " sucking    HPI:   Pertinent  history: c/section 37 weeks due to cyst and fibroid removed    Mother does not have a history of advanced maternal age, GDM, hypertension prior to pregnancy, GHTN, insulin resistance, multiple gestation, PCOS, thyroid disease, auto immune disease , and breast surgery      History of breast surgeries: No    FEEDING HISTORY:    Previous Breastfeeding History: First baby, did start offering, mom had afib and other family issues and had to stop   Hospital Course: Baby born at [facility], breastfeeding initially but had lost 10% and offered a bottle that caused choking. Put on an apnea monitor and desaturated. ENT intervened with swallow studies but nothing found. Still desating with feedings, transferred to [facility] and spent 24 days in the NICU with most difficulty bottle feeding.   Prior to consultation 2023: Home for 7 days, mom has introduced breastfeeding with and without NS   Prior to consultation on 2023 Mom has been nursing more exclusively, weaning formula down to 2oz and less pumping. Baby growth slowed to 0.5oz per day. Mom had an ear infection treated and her mother readmitted to the hospital. Her insurance does not cover mental health.   Currently 2023 Mom chose to increase breastfeeding, less pumping except mom seng on the less producing side and baby do more breastfeeding. Baby is very content but only gained 5.3oz in 13 days, pooping once in the last 2 days. No formula. Anything mom pumps is given back to her daughter. Baby developed a cold 3 days ago, no fever, but congested. Has [facility] appt this week, and returns to work this week.     Both breasts: Yes    Supplement: Expressed breast milk and Formula has been stopped now  Quantity: has taken up to 110ml  How given/devices: Bottle  Bottle/nipple type: Level 1 now    Nipple Shield Use: None    Breast Pumping:  Frequency: varies//24 hours  Quantity Obtained: 1.0oz  Type of Pump: Spectra and mom  cozy  Flange size/type: 21mm has 17 and 19mm  Wearable: Yes and predominate source    Maternal ROS:  Constitutional: No fever, chills. Feeling well  Breasts: No soreness of breasts and No soreness of nipples  Psychiatric: Managing ok  Mental Health: No mention of feeling irritable, agitated, angry, overwhelmed, apathetic, appetite changes, exhausted nor having sleep changes outside infant feeds/demands.  Current Outpatient Medications on File Prior to Visit   Medication Sig Dispense Refill    ibuprofen (MOTRIN) 800 MG Tab TAKE 1 TABLET BY MOUTH EVERY 8 HOURS INTERVAL.       No current facility-administered medications on file prior to visit.      Past Medical History:   Diagnosis Date    Depression        Objective:     Maternal Physical Lactation Exam  General: No acute distress  Breasts: Symmetrical , Soft, Plugged Duct - no evidence, and Mastitis  - no S/S  Nipples: intact  Psychiatric: Normal mood and affect. Her behavior is normal. Judgment and thought content normal.  Mental Health: Did NOT exhibit sadness, crying, feeling overwhelmed, agitation or hypervigilance.    Assessment/Plan & Lactation Counseling:     Infant Exam on Infant Chart    Infant Weight History:   3/15/23 6#11.9oz  3/18/23  7#9.3oz  4/17/23: 8#0.7oz  4/25/2023  8#4.7oz  5/8/2023   8#9.4oz Percentiles trending down    Infant Intake at Breast: Left 24ml +0ml     right 3ml   Total: 24ml, last visit 13 days ago 57ml, much more milk available  Milk Transfer at this feeding:   Much improved effectiveness but supply falling  Type of Pump: Platinum   Quantity Pumped:     Total: 6ml  Initiation of Feeding: Infant initiates  Position of Feeding:    Right: cross cradle  Left: football and cross cradle  Attachment Achieved: Independent   Nipple shield:     None now  Difficult Latch Due To:   Low supply  Suck Pattern at the Breast: Suck burst and normal rest, no chewing, and Gulping lessened  Suck Pattern on the Bottle:   Suck burst and normal  rest  Behavior Following Observed Feeding: content  Nipple Pain: None     Latch: More independent  Suckling/Feeding: attaches, baby roots, elicits PLACIDO, frequent pauses, and rhythmic   Sucking strength: Moderate Strong  Sucking Rhythm Coordinated   Compression: WNL    Once latched, baby fell into a more mature and integrated SSB pattern although squeaking  indicative of still coordinating SSB.   If functional feeding, it is quiet, rhythmic, coordinated, organized, effeicent safe, satisfying and pleasurable for both parent and baby?   Not yet  Milk Supply Available: Fell over the last 2 weeks    Low Milk Supply:   Likely due to: ineffective or infrequent breast stimulation or milk removal    MATERNAL PERSONALIZED BREASTFEEDING PLAN  Discussed concerns and symptoms as listed above in assessment and guidance summarized below. Shared decision making was used between myself and the family for this encounter, home care, and follow up. Topics reviewed included:    Mood and Anxiety Disorders: Having a new baby can be joyful time for some new moms, but a difficult time for others. For all, it is a time of profound physical and emotional change. Balancing baby, family, partners and friends, work, pets, and preexisting or new life stressors as well as sleep deprivation can be extremely challenging. Untreated depression, sleep exhaustion, and anxiety are each a challenge that must be addressed and in addition can contribute to early cessation of breastfeeding. It is important both for the mental health and physical health of new moms and babies to get on the path to feeling better and if therapeutic support is needed, specific resources are available, please ask.  Referred 2023 but insurance doesn't cover, will look for supported counseling and share, although all are private pay that are available 2023  Triple Feeding and its sustainability and its impact on the mother baby relationship  Milk Supply is  dependent on glandular tissue development, hormonal influences, how many times the baby removes milk and how well the breasts are emptied in a 24 hour period. This is a biological reality that we can NOT work around. If, for any reason, your baby is not latching, or you are not able to nurse, then it is important for you to remove the milk instead by pumping or hand expression.  There's no magic trick, tea, food, drink, cookie or supplement that will increase your milk supply. One  must  effectively remove milk to continue to make and maximize milk. In the early days and weeks that can be 8+ times in 24 hours. For older babies, on average 6-7 + times in 24 hours.    Feeding:   Infant feeding of concern given current interval growth, guidelines to follow:  4/25/23 Specific plan in writing sent home with mom including breast 4x/day, pumping 4x/day and bottles of 2.5-3oz four times per day  5/8/2023 Specific plan in writing provided with two options, above or breast/top off one feeding, then bottle and pump the next. Nighttime, 20 min feeding, top off, pump left breast while nursing  Supplement:   Supplement with expressed milk  Supplement with formula   Proper powder formula preparation: https://www.cdc.gov/nutrition/downloads/prepare-store-powered-zcrabi-ythtnpl-483.pdf.   For babies under 2 months: https://www.cdc.gov/cronobacter/infection-and-infants.html  Pumping Guidelines:  Both breasts   Sustaining a healthy baseline prolactin level is vital- this means feeding/pumping at least every 3 hours with no more than a 5 hour break at night.   Pump 4 times in 24 hours depending on the option mom chooses  Goal to increase supply but first to have baby get more milk   Type of Pump:  Spectra  Spectra Settings  Press letdown button when first starting         Cycle: 70 / Vacuum: L1 - L 5 (To comfort)  Once milk lets down, press letdown button again  Cycle: 54 / Vacuum: L1 - L12 (To comfort) 6-8L or higher only to  comfort  How long will vary woman to woman, typically 8-15 minutes, or 1-2 minutes after flow slows  Flange Fit: Freely moving nipple in the tunnel with some movement of the areola.  Storage (Acceptable guidelines for healthy term babies)  10 hours at room temp including your pieces, may rinse but not mandatory  8 days refrigerator, don't need  to refrigerate right away if using fresh pumped milk at the next feeding  Freezer 1 year  Deep freezer 2 years  American Academy or Pediatrics has said you may mix different temperature milks.   If baby drinks breast milk from a fresh or refrigerated bottle of breast milk,  you may return the unused portion to the refrigerator and use once at next feeding.   Increasing supply besides [place]og[place]'s and Pumping:  Warmth  Relaxation   Physical, auditory narratives  Childbirth relaxation Techniques  Acupuncture and acupressure  [facility] [email or web address]   [place] [email or web address]  Connect with other mothers:  Breastfeeding [place] including opportunity to weight your baby and do before and after weights WEIGHT CHECKS  Tuesdays 10am - 11am. Women's Health at [address] [place] [address], [facility] [address] [place] [address], 3rd floor conference room  Check your baby's weight, do a feeding and see how your baby is growing, visit with other mothers, plan on a walk or coffee date after group.  Please download Growth: Baby and Child for Apple or Child Growth Tracker for Six Month [place] if you would like to  document and follow your baby's growth curve.  Please wear a mask coming and going: you may remove it in the classroom  Due to space limitations - limit strollers please (New c/section moms please use your stroller).  We would love to have dads stay, but moms won't breastfeed if there are men in the room, sorry.  The room is generally scheduled for another event following group.  Please take all diapers with you   ONLINE SUPPORT GROUPS  [employer]partum [employer] (PSI)  support groups are conducted using a [id number] support model and are not intended for those experiencing a mental health crisis. Groups are 90 minutes (1.5 hours) in length. The first ~30 minutes is providing information, education, and establishing group guidelines. The next ~60 minutes is “talk time,” in which group members share and talk with each other. Group members must be present for the group guidelines before joining in the discussion or “talk time.”     Follow up requires close monitoring in this time sensitive window of opportunity to  re-establish milk supply and grow baby better.  Please call [phone] our voicemail line or the front office at [phone] to scheduled your next appointment.  Family is encouraged to e-mail or mychart us to update how the plan is working.    A total of 70 minutes, not including infant assessment time, with more than 50% was spent preparing to see the patient, obtaining and reviewing separately obtained history, performing a medically appropriate examination and evaluation, counseling and educating the family, referring and communicating with other health care professionals, documenting clinical information in the electronic health record, independently interpreting weighted feeds and infant growth results, communicating these results to the family and care coordination as detailed in the above note.       RQOUE Heller.

## 2023-08-21 ENCOUNTER — OFFICE VISIT (OUTPATIENT)
Dept: URGENT CARE | Facility: CLINIC | Age: 33
End: 2023-08-21
Payer: COMMERCIAL

## 2023-08-21 VITALS
HEIGHT: 61 IN | TEMPERATURE: 97.2 F | SYSTOLIC BLOOD PRESSURE: 120 MMHG | BODY MASS INDEX: 39.63 KG/M2 | WEIGHT: 209.9 LBS | OXYGEN SATURATION: 97 % | HEART RATE: 87 BPM | RESPIRATION RATE: 19 BRPM | DIASTOLIC BLOOD PRESSURE: 86 MMHG

## 2023-08-21 DIAGNOSIS — J02.0 ACUTE STREPTOCOCCAL PHARYNGITIS: ICD-10-CM

## 2023-08-21 PROBLEM — I44.1 WENCKEBACH: Status: ACTIVE | Noted: 2023-02-16

## 2023-08-21 PROBLEM — I48.91 ATRIAL FIBRILLATION (HCC): Status: ACTIVE | Noted: 2022-11-30

## 2023-08-21 PROBLEM — R00.2 PALPITATION: Status: ACTIVE | Noted: 2022-11-30

## 2023-08-21 LAB — S PYO DNA SPEC NAA+PROBE: DETECTED

## 2023-08-21 PROCEDURE — 3079F DIAST BP 80-89 MM HG: CPT | Performed by: NURSE PRACTITIONER

## 2023-08-21 PROCEDURE — 3074F SYST BP LT 130 MM HG: CPT | Performed by: NURSE PRACTITIONER

## 2023-08-21 PROCEDURE — 99214 OFFICE O/P EST MOD 30 MIN: CPT | Performed by: NURSE PRACTITIONER

## 2023-08-21 PROCEDURE — 87651 STREP A DNA AMP PROBE: CPT | Performed by: NURSE PRACTITIONER

## 2023-08-21 RX ORDER — AMOXICILLIN 500 MG/1
500 CAPSULE ORAL 2 TIMES DAILY
Qty: 20 CAPSULE | Refills: 0 | Status: SHIPPED | OUTPATIENT
Start: 2023-08-21 | End: 2023-08-31

## 2023-08-21 ASSESSMENT — FIBROSIS 4 INDEX: FIB4 SCORE: 0.49

## 2023-08-21 NOTE — PROGRESS NOTES
Chief Complaint   Patient presents with    Fever    Sore Throat       HISTORY OF PRESENT ILLNESS: Patient is a pleasant 32 y.o. female who presents today due to complaints of a sore throat for the past two days. Reports associated fever, chills, pain with swallowing. Pain is moderate. Denies associated rash, chest pain, urinary complaints, congestion, cough, or difficulty breathing. She has tried OTC medications at home without much improvement.     Patient Active Problem List    Diagnosis Date Noted    Bret 02/16/2023    31 weeks gestation of pregnancy 01/31/2023    Second degree heart block 01/31/2023    Atrial fibrillation (HCC) 11/30/2022    Palpitation 11/30/2022    Obesity 04/21/2021    Impaired glucose tolerance 04/21/2021    Exposure to the flu 12/20/2019       Allergies:Patient has no known allergies.    Current Outpatient Medications Ordered in Epic   Medication Sig Dispense Refill    ibuprofen (MOTRIN) 800 MG Tab TAKE 1 TABLET BY MOUTH EVERY 8 HOURS INTERVAL.       No current Epic-ordered facility-administered medications on file.       Past Medical History:   Diagnosis Date    Depression        Social History     Tobacco Use    Smoking status: Never    Smokeless tobacco: Never   Vaping Use    Vaping Use: Never used   Substance Use Topics    Alcohol use: No    Drug use: No       No family status information on file.   History reviewed. No pertinent family history.    ROS:  Review of Systems   Constitutional: Positive for fever, chills. Negative for weight loss, malaise, and fatigue.   HENT: Positive for sore throat. Negative for ear pain, nosebleeds, congestion.   Eyes: Negative for vision changes.   Cardiovascular: Negative for chest pain, palpitations, orthopnea and leg swelling.   Respiratory: Negative for cough, sputum production, shortness of breath and wheezing.   Gastrointestinal: Negative for abdominal pain, nausea, vomiting or diarrhea.   : Negative for changes in urination.   Skin:  "Negative for rash, diaphoresis.     Exam:  /86 (BP Location: Left arm, Patient Position: Sitting, BP Cuff Size: Adult)   Pulse 87   Temp 36.2 °C (97.2 °F) (Temporal)   Resp 19   Ht 1.549 m (5' 1\")   Wt 95.2 kg (209 lb 14.4 oz)   SpO2 97%   General: well-nourished, well-developed female in NAD  Head: normocephalic, atraumatic  Eyes: PERRLA, no conjunctival injection, acuity grossly intact, lids normal.  Ears: normal shape and symmetry, no tenderness, no discharge. External canals are without any significant edema or erythema. Tympanic membranes are without any inflammation, no effusion. Gross auditory acuity is intact.  Nose: symmetrical without tenderness, no discharge.  Mouth/Throat: reasonable hygiene. There is erythema, with exudates and tonsillar enlargement present.  Neck: no masses, range of motion within normal limits, no tracheal deviation. No obvious thyroid enlargement.   Lymph: bilateral anterior cervical adenopathy. No supraclavicular adenopathy.   Neuro: alert and oriented. Cranial nerves 1-12 grossly intact. No sensory deficit.   Cardiovascular: regular rate and rhythm. No edema.  Pulmonary: no distress. Chest is symmetrical with respiration, no wheezes, crackles, or rhonchi.   Musculoskeletal: no clubbing, appropriate muscle tone, gait is stable.  Skin: warm, dry, intact, no clubbing, no cyanosis, no rashes.   Psych: appropriate mood, affect, judgement.       POC strep positive      Assessment/Plan:  1. Acute streptococcal pharyngitis  POCT GROUP A STREP, PCR    amoxicillin (AMOXIL) 500 MG Cap                Antibiotic as directed. OTC motrin or tylenol for pain/fever control. Hand hygiene. Increase fluid intake, rest. Warm salt water gargles.   Supportive care, differential diagnoses, and indications for immediate follow-up discussed with patient.   Pathogenesis of diagnosis discussed including typical length and natural progression.   Instructed to return to clinic or nearest emergency " department for any change in condition, further concerns, or worsening of symptoms.  Patient states understanding of the plan of care and discharge instructions.  Instructed to make an appointment, for follow up, with her primary care provider.        Please note that this dictation was created using voice recognition software. I have made every reasonable attempt to correct obvious errors, but I expect that there are errors of grammar and possibly content that I did not discover before finalizing the note.       ROQUE Rodriguez.

## 2023-09-08 ENCOUNTER — OFFICE VISIT (OUTPATIENT)
Dept: URGENT CARE | Facility: CLINIC | Age: 33
End: 2023-09-08
Payer: COMMERCIAL

## 2023-09-08 VITALS
HEART RATE: 94 BPM | BODY MASS INDEX: 39.46 KG/M2 | HEIGHT: 61 IN | DIASTOLIC BLOOD PRESSURE: 84 MMHG | RESPIRATION RATE: 15 BRPM | SYSTOLIC BLOOD PRESSURE: 112 MMHG | WEIGHT: 209 LBS | OXYGEN SATURATION: 97 % | TEMPERATURE: 97.2 F

## 2023-09-08 DIAGNOSIS — J06.9 VIRAL URI WITH COUGH: ICD-10-CM

## 2023-09-08 DIAGNOSIS — J02.9 SORE THROAT: ICD-10-CM

## 2023-09-08 LAB — S PYO DNA SPEC NAA+PROBE: NOT DETECTED

## 2023-09-08 PROCEDURE — 3079F DIAST BP 80-89 MM HG: CPT | Performed by: PHYSICIAN ASSISTANT

## 2023-09-08 PROCEDURE — 87651 STREP A DNA AMP PROBE: CPT | Performed by: PHYSICIAN ASSISTANT

## 2023-09-08 PROCEDURE — 99213 OFFICE O/P EST LOW 20 MIN: CPT | Performed by: PHYSICIAN ASSISTANT

## 2023-09-08 PROCEDURE — 3074F SYST BP LT 130 MM HG: CPT | Performed by: PHYSICIAN ASSISTANT

## 2023-09-08 ASSESSMENT — ENCOUNTER SYMPTOMS
DIARRHEA: 0
HEADACHES: 1
DIZZINESS: 0
TROUBLE SWALLOWING: 1
WHEEZING: 0
COUGH: 1
SHORTNESS OF BREATH: 0
NECK PAIN: 1
FEVER: 1
VOMITING: 0
SORE THROAT: 1
ABDOMINAL PAIN: 0
CARDIOVASCULAR NEGATIVE: 1
SWOLLEN GLANDS: 1
CHILLS: 1

## 2023-09-08 ASSESSMENT — FIBROSIS 4 INDEX: FIB4 SCORE: 0.49

## 2023-09-08 NOTE — PROGRESS NOTES
Subjective     Sherrell Toro is a very pleasant 32 y.o. female who presents with Cough (X 1 day, chills, headache, cough, fatigue, nausea, body aches, Pt does state that she did not complete round of medication from 8/21, feels like she might have streph again )            Pharyngitis   This is a new problem. The current episode started in the past 7 days. The problem has been unchanged. The maximum temperature recorded prior to her arrival was 101 - 101.9 F. The fever has been present for 1 to 2 days. Associated symptoms include coughing, ear pain, headaches, neck pain, swollen glands and trouble swallowing. Pertinent negatives include no abdominal pain, congestion, diarrhea, shortness of breath or vomiting. She has had no exposure to strep. She has tried NSAIDs and acetaminophen for the symptoms. The treatment provided mild relief.       PMH:  has a past medical history of Depression.  MEDS: No current outpatient medications on file.  ALLERGIES: No Known Allergies  SURGHX:   Past Surgical History:   Procedure Laterality Date    MYOMECTOMY      OTHER Right     myoectomy with Rt. cyst removed    OVARIAN CYSTECTOMY Right     PRIMARY C SECTION       SOCHX:  reports that she has never smoked. She has never used smokeless tobacco. She reports that she does not drink alcohol and does not use drugs.  FH: family history is not on file.      Review of Systems   Constitutional:  Positive for chills and fever.   HENT:  Positive for ear pain, sore throat and trouble swallowing. Negative for congestion.    Respiratory:  Positive for cough. Negative for shortness of breath and wheezing.    Cardiovascular: Negative.    Gastrointestinal:  Negative for abdominal pain, diarrhea and vomiting.   Musculoskeletal:  Positive for neck pain.   Neurological:  Positive for headaches. Negative for dizziness.       Medications, Allergies, and current problem list reviewed today in Epic           Objective     /84   Pulse 94    "Temp 36.2 °C (97.2 °F) (Temporal)   Resp 15   Ht 1.549 m (5' 1\")   Wt 94.8 kg (209 lb)   SpO2 97%   BMI 39.49 kg/m²      Physical Exam  Vitals and nursing note reviewed.   Constitutional:       General: She is not in acute distress.     Appearance: Normal appearance. She is well-developed. She is not ill-appearing, toxic-appearing or diaphoretic.   HENT:      Head: Normocephalic and atraumatic.      Right Ear: Tympanic membrane, ear canal and external ear normal.      Left Ear: Tympanic membrane, ear canal and external ear normal.      Nose: Congestion and rhinorrhea present.      Mouth/Throat:      Mouth: Mucous membranes are moist.      Pharynx: No oropharyngeal exudate or posterior oropharyngeal erythema.   Eyes:      General:         Right eye: No discharge.         Left eye: No discharge.      Conjunctiva/sclera: Conjunctivae normal.   Cardiovascular:      Rate and Rhythm: Normal rate and regular rhythm.      Pulses: Normal pulses.      Heart sounds: Normal heart sounds.   Pulmonary:      Effort: Pulmonary effort is normal. No respiratory distress.      Breath sounds: Normal breath sounds. No wheezing, rhonchi or rales.   Musculoskeletal:      Cervical back: Normal range of motion and neck supple.      Right lower leg: No edema.      Left lower leg: No edema.   Lymphadenopathy:      Cervical: No cervical adenopathy.   Skin:     General: Skin is warm and dry.   Neurological:      General: No focal deficit present.      Mental Status: She is alert and oriented to person, place, and time. Mental status is at baseline.   Psychiatric:         Mood and Affect: Mood normal.         Behavior: Behavior normal.         Thought Content: Thought content normal.         Judgment: Judgment normal.                             Assessment & Plan     This is a very pleasant 32-year-old female presenting with cough, congestion, sore throat and fatigue for the past few days.  Was treated with antibiotics for strep pharyngitis " a few weeks ago but those symptoms resolved.  Both kids at home are sick with similar symptoms.  Fever has been manageable and mild.  No shortness of breath, wheezing, chest pain, vomiting or diarrhea.  Vitals normal.  Exam shows nasal congestion and rhinorrhea.  TMs clear bilaterally posterior pharynx clear without signs of bacterial infection.  Lungs are clear bilateral without wheezing rhonchi or rails.  In clinic strep testing negative.  No signs of focal bacterial infection.  Patient be treated for a self-limiting viral URI with OTC meds and conservative measures.  Note for work provided.    1. Sore throat  POCT CEPHEID GROUP A STREP - PCR      2. Viral URI with cough            I personally reviewed prior external notes and test results pertinent to today's visit. Return to clinic or go to ED if symptoms worsen or persist. Red flag symptoms and indications for ED discussed at length. Patient/Parent/Guardian voices understanding.  AVS with post-visit instructions provided or given verbally.  Follow-up with your primary care provider in 3-5 days. All side effects and potential interactions of prescribed medication discussed including allergic response, GI upset, tendon injury, rash, sedation, OCP effectiveness, etc.    Please note that this dictation was created using voice recognition software. I have made every reasonable attempt to correct obvious errors, but I expect that there are errors of grammar and possibly content that I did not discover before finalizing the note.

## 2023-09-08 NOTE — LETTER
September 8, 2023         Patient: Sherrell Toro   YOB: 1990   Date of Visit: 9/8/2023           To Whom it May Concern:    Sherrell Toro was seen in my clinic on 9/8/2023. Please excuse any absences from work this week due to acute illness.        If you have any questions or concerns, please don't hesitate to call.        Sincerely,           Gabino Michael P.A.-C.  Electronically Signed

## 2023-09-13 ENCOUNTER — OFFICE VISIT (OUTPATIENT)
Dept: URGENT CARE | Facility: PHYSICIAN GROUP | Age: 33
End: 2023-09-13
Payer: COMMERCIAL

## 2023-09-13 VITALS
HEART RATE: 98 BPM | TEMPERATURE: 97.2 F | SYSTOLIC BLOOD PRESSURE: 124 MMHG | RESPIRATION RATE: 12 BRPM | WEIGHT: 209 LBS | OXYGEN SATURATION: 98 % | BODY MASS INDEX: 39.46 KG/M2 | HEIGHT: 61 IN | DIASTOLIC BLOOD PRESSURE: 88 MMHG

## 2023-09-13 DIAGNOSIS — J06.9 VIRAL URI WITH COUGH: ICD-10-CM

## 2023-09-13 PROCEDURE — 99213 OFFICE O/P EST LOW 20 MIN: CPT | Performed by: PHYSICIAN ASSISTANT

## 2023-09-13 PROCEDURE — 3079F DIAST BP 80-89 MM HG: CPT | Performed by: PHYSICIAN ASSISTANT

## 2023-09-13 PROCEDURE — 3074F SYST BP LT 130 MM HG: CPT | Performed by: PHYSICIAN ASSISTANT

## 2023-09-13 RX ORDER — BENZONATATE 100 MG/1
200 CAPSULE ORAL 3 TIMES DAILY PRN
Qty: 60 CAPSULE | Refills: 0 | Status: SHIPPED | OUTPATIENT
Start: 2023-09-13

## 2023-09-13 ASSESSMENT — ENCOUNTER SYMPTOMS
SPUTUM PRODUCTION: 0
DIZZINESS: 0
COUGH: 1
ABDOMINAL PAIN: 0
VOMITING: 0
FEVER: 0
MYALGIAS: 0
DIARRHEA: 0
WHEEZING: 0
HEADACHES: 1
PALPITATIONS: 0
DIAPHORESIS: 0
SORE THROAT: 1
SHORTNESS OF BREATH: 0
CHILLS: 1
SINUS PAIN: 0
NAUSEA: 0

## 2023-09-13 ASSESSMENT — FIBROSIS 4 INDEX: FIB4 SCORE: 0.49

## 2023-09-13 NOTE — PROGRESS NOTES
Subjective:     CHIEF COMPLAINT  Chief Complaint   Patient presents with    Sore Throat     Had strep a month ago and didn't finish Ax. Now have coughing, chills, headache. Was retested x4 days ago for strep which was negative. Still have cough, stuffy nose, and sore throat.        HPI  Sherrell Toro is a very pleasant 32 y.o. female who presents to the clinic with URI-like symptoms x1 week.  Patient is currently experiencing a mild scratchy sore throat, sinus congestion, cough, fatigue and headache.  Symptoms have remained fairly persistent over the last week.  She has not been running a fever.  Recently seen and evaluated in clinic on 9/8/2023.  At that time she tested negative for strep via PCR testing.  One of her children are experiencing similar symptoms at this time.  She is currently taking TheraFlu for symptoms with slight relief.  No shortness of breath or chest pain.    REVIEW OF SYSTEMS  Review of Systems   Constitutional:  Positive for chills and malaise/fatigue. Negative for diaphoresis and fever.   HENT:  Positive for congestion and sore throat. Negative for ear pain and sinus pain.    Respiratory:  Positive for cough. Negative for sputum production, shortness of breath and wheezing.    Cardiovascular:  Negative for chest pain and palpitations.   Gastrointestinal:  Negative for abdominal pain, diarrhea, nausea and vomiting.   Musculoskeletal:  Negative for myalgias.   Neurological:  Positive for headaches. Negative for dizziness.   Endo/Heme/Allergies:  Negative for environmental allergies.       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    ALLERGIES  No Known Allergies    CURRENT MEDICATIONS  Home Medications       Reviewed by Manuel Lassiter P.A.-C. (Physician Assistant) on 09/13/23 at 1409  Med List Status: <None>     Medication Last Dose Status        Patient Ernesto Taking any Medications                           SOCIAL HISTORY  Social History     Tobacco Use    Smoking status: Never    Smokeless tobacco: Never   Vaping Use    Vaping Use: Never used   Substance and Sexual Activity    Alcohol use: No    Drug use: No    Sexual activity: Not on file       FAMILY HISTORY  History reviewed. No pertinent family history.       Objective:     VITAL SIGNS: /88   Pulse 98   Temp 36.2 °C (97.2 °F)   Resp 12   Ht 1.549 m (5' 1\")   Wt 94.8 kg (209 lb)   SpO2 98%   Breastfeeding Unknown   BMI 39.49 kg/m²     PHYSICAL EXAM  Physical Exam  Constitutional:       General: She is not in acute distress.     Appearance: Normal appearance. She is not ill-appearing, toxic-appearing or diaphoretic.   HENT:      Head: Normocephalic and atraumatic.      Right Ear: Tympanic membrane, ear canal and external ear normal.      Left Ear: Tympanic membrane, ear canal and external ear normal.      Nose: Congestion present. No rhinorrhea.      Mouth/Throat:      Mouth: Mucous membranes are moist.      Pharynx: No oropharyngeal exudate or posterior oropharyngeal erythema.   Eyes:      Conjunctiva/sclera: Conjunctivae normal.   Cardiovascular:      Rate and Rhythm: Normal rate and regular rhythm.      Pulses: Normal pulses.      Heart sounds: Normal heart sounds.   Pulmonary:      Effort: Pulmonary effort is normal.      Breath sounds: Normal breath sounds. No wheezing, rhonchi or rales.   Musculoskeletal:      Cervical back: Normal range of motion. No muscular tenderness.   Lymphadenopathy:      Cervical: No cervical adenopathy.   Skin:     General: Skin is warm and dry.      " Capillary Refill: Capillary refill takes less than 2 seconds.   Neurological:      Mental Status: She is alert.   Psychiatric:         Mood and Affect: Mood normal.         Thought Content: Thought content normal.         Assessment/Plan:     1. Viral URI with cough  - benzonatate (TESSALON) 100 MG Cap; Take 2 Capsules by mouth 3 times a day as needed for Cough.  Dispense: 60 Capsule; Refill: 0      MDM/Comments:    -Discussed viral etiology of URI.     Symptomatic care.  -Oral hydration and rest.   -Over the counter expectorant as directed; Guaifenesin (Mucinex).  -OTC antihistamine as directed for rhinorrhea (runny nose) and sneezing.  -Tylenol or ibuprofen for pain and fever as directed.   -Saline nasal spray as a decongestant.    Follow up for shortness of breath, fevers, elevated heart rate, weakness, prolonged cough, chest pain, or any other concerns.      Differential diagnosis, natural history, supportive care, and indications for immediate follow-up discussed. All questions answered. Patient agrees with the plan of care.    Follow-up as needed if symptoms worsen or fail to improve to PCP, Urgent care or Emergency Room.    I have personally reviewed prior external notes and test results pertinent to today's visit.  I have independently reviewed and interpreted all diagnostics ordered during this urgent care acute visit.   Discussed management options (risks,benefits, and alternatives to treatment). Pt expresses understanding and the treatment plan was agreed upon. Questions were encouraged and answered to pt's satisfaction.    Please note that this dictation was created using voice recognition software. I have made a reasonable attempt to correct obvious errors, but I expect that there are errors of grammar and possibly content that I did not discover before finalizing the note.

## 2023-09-13 NOTE — LETTER
September 13, 2023    To Whom It May Concern:         This is confirmation that Sherrell Toro attended her scheduled appointment with Manuel Lassiter P.A.-C. on 9/13/23.  Please excuse the patient's absence from work on 9/13/2023.         If you have any questions please do not hesitate to call me at the phone number listed below.    Sincerely,          Manuel Lassiter P.A.-C.  [phone]

## 2023-09-23 ENCOUNTER — OFFICE VISIT (OUTPATIENT)
Dept: URGENT CARE | Facility: CLINIC | Age: 33
End: 2023-09-23
Payer: COMMERCIAL

## 2023-09-23 VITALS
RESPIRATION RATE: 20 BRPM | SYSTOLIC BLOOD PRESSURE: 114 MMHG | HEIGHT: 61 IN | TEMPERATURE: 97.8 F | DIASTOLIC BLOOD PRESSURE: 70 MMHG | BODY MASS INDEX: 39.33 KG/M2 | OXYGEN SATURATION: 100 % | HEART RATE: 87 BPM | WEIGHT: 208.3 LBS

## 2023-09-23 DIAGNOSIS — J02.0 PHARYNGITIS DUE TO STREPTOCOCCUS SPECIES: ICD-10-CM

## 2023-09-23 LAB
FLUAV RNA SPEC QL NAA+PROBE: NEGATIVE
FLUBV RNA SPEC QL NAA+PROBE: NEGATIVE
RSV RNA SPEC QL NAA+PROBE: NEGATIVE
S PYO DNA SPEC NAA+PROBE: DETECTED
SARS-COV-2 RNA RESP QL NAA+PROBE: NEGATIVE

## 2023-09-23 PROCEDURE — 87651 STREP A DNA AMP PROBE: CPT

## 2023-09-23 PROCEDURE — 99213 OFFICE O/P EST LOW 20 MIN: CPT

## 2023-09-23 PROCEDURE — 0241U POCT CEPHEID COV-2, FLU A/B, RSV - PCR: CPT

## 2023-09-23 PROCEDURE — 3074F SYST BP LT 130 MM HG: CPT

## 2023-09-23 PROCEDURE — 3078F DIAST BP <80 MM HG: CPT

## 2023-09-23 RX ORDER — CEFDINIR 300 MG/1
300 CAPSULE ORAL 2 TIMES DAILY
Qty: 20 CAPSULE | Refills: 0 | Status: SHIPPED | OUTPATIENT
Start: 2023-09-23 | End: 2023-10-03

## 2023-09-23 ASSESSMENT — FIBROSIS 4 INDEX: FIB4 SCORE: 0.49

## 2023-09-23 NOTE — PROGRESS NOTES
Subjective:   Sherrell Toro is a 32 y.o. female who presents for Headache (X1DAY CHILLS/BODY ACHES/SORE THROAT/)      HPI:    Patient presents to urgent care with concerns of sore throat and body aches x2 days.  States she tested positive for strep pharyngitis during UC visit in August and she was started on amoxicillin. She states she developed a weird peeling rash on her hands after she started the medication. Denies hives, CP, sob, dizziness, palpitations, nausea, vomiting, diarrhea. She completed approximately 5 or 7 days of the 10 day course before the rash appeared.  Today she denies rhinorrhea, nasal congestion, cough.  She denies fever, but has been having chills and body aches similar to her strep infection in August.   Denies nausea, vomiting, diarrhea  She is tolerating solids and fluids, reports pain with swallowing solids but she is able to do so .   Denies rash.  Reports normal urinary output.    ROS As above in HPI    Medications:    Current Outpatient Medications on File Prior to Visit   Medication Sig Dispense Refill    benzonatate (TESSALON) 100 MG Cap Take 2 Capsules by mouth 3 times a day as needed for Cough. (Patient not taking: Reported on 9/23/2023) 60 Capsule 0     No current facility-administered medications on file prior to visit.        Allergies:   Patient has no known allergies.    Problem List:   Patient Active Problem List   Diagnosis    Exposure to the flu    Obesity    Impaired glucose tolerance    31 weeks gestation of pregnancy    Second degree heart block    Atrial fibrillation (HCC)    Palpitation    Bret        Surgical History:  Past Surgical History:   Procedure Laterality Date    MYOMECTOMY      OTHER Right     myoectomy with Rt. cyst removed    OVARIAN CYSTECTOMY Right     PRIMARY C SECTION         Past Social Hx:   Social History     Tobacco Use    Smoking status: Never    Smokeless tobacco: Never   Vaping Use    Vaping Use: Never used   Substance Use Topics  "   Alcohol use: No    Drug use: No          Problem list, medications, and allergies reviewed by myself today in Epic.     Objective:     /70 (BP Location: Left arm, Patient Position: Sitting)   Pulse 87   Temp 36.6 °C (97.8 °F) (Temporal)   Resp 20   Ht 1.549 m (5' 1\")   Wt 94.5 kg (208 lb 4.8 oz)   LMP 09/15/2023 (Exact Date)   SpO2 100%   BMI 39.36 kg/m²     Physical Exam  Vitals and nursing note reviewed.   Constitutional:       Appearance: Normal appearance.   HENT:      Head: Normocephalic.      Right Ear: Tympanic membrane and ear canal normal.      Left Ear: Tympanic membrane and ear canal normal.      Nose: Nose normal.      Mouth/Throat:      Mouth: Mucous membranes are moist.      Pharynx: Uvula midline. Pharyngeal swelling and posterior oropharyngeal erythema present.      Tonsils: No tonsillar exudate. 3+ on the right. 3+ on the left.   Cardiovascular:      Rate and Rhythm: Normal rate and regular rhythm.      Heart sounds: Normal heart sounds.   Pulmonary:      Effort: Pulmonary effort is normal.      Breath sounds: Normal breath sounds.   Abdominal:      General: Bowel sounds are normal.      Palpations: Abdomen is soft.   Musculoskeletal:      Cervical back: No rigidity or tenderness.   Lymphadenopathy:      Cervical: Cervical adenopathy present.   Skin:     General: Skin is warm and dry.      Capillary Refill: Capillary refill takes less than 2 seconds.      Findings: No rash.   Neurological:      Mental Status: She is alert and oriented to person, place, and time.         Assessment/Plan:       Results for orders placed or performed in visit on 09/23/23   POCT CEPHEID COV-2, FLU A/B, RSV - PCR   Result Value Ref Range    SARS-CoV-2 by PCR Negative Negative, Invalid    Influenza virus A RNA Negative Negative, Invalid    Influenza virus B, PCR Negative Negative, Invalid    RSV, PCR Negative Negative, Invalid   POCT CEPHEID GROUP A STREP - PCR   Result Value Ref Range    POC Group A " Strep, PCR Detected (A) Not Detected, Invalid       Diagnosis and associated orders:   1. Pharyngitis due to Streptococcus species  - POCT CEPHEID COV-2, FLU A/B, RSV - PCR  - POCT CEPHEID GROUP A STREP - PCR  - cefdinir (OMNICEF) 300 MG Cap; Take 1 Capsule by mouth 2 times a day for 10 days.  Dispense: 20 Capsule; Refill: 0        Comments/MDM:       Rapid strep is positive  Hygiene measures reviewed to prevent coinfection  Supportive measures encouraged: Rest, increased oral hydration, NSAIDs/tylenol as needed per package instructions, lozenges, ice pops, diet as tolerated.  Follow up with PCP advised       Please note that this dictation was created using voice recognition software. I have made a reasonable attempt to correct obvious errors, but I expect that there are errors of grammar and possibly content that I did not discover before finalizing the note.    This note was electronically signed by SKIP Garcia

## 2023-11-07 ENCOUNTER — HOSPITAL ENCOUNTER (EMERGENCY)
Facility: MEDICAL CENTER | Age: 33
End: 2023-11-08
Attending: STUDENT IN AN ORGANIZED HEALTH CARE EDUCATION/TRAINING PROGRAM
Payer: COMMERCIAL

## 2023-11-07 VITALS
DIASTOLIC BLOOD PRESSURE: 95 MMHG | OXYGEN SATURATION: 94 % | SYSTOLIC BLOOD PRESSURE: 149 MMHG | HEART RATE: 91 BPM | BODY MASS INDEX: 41.66 KG/M2 | RESPIRATION RATE: 18 BRPM | HEIGHT: 61 IN | TEMPERATURE: 98.2 F | WEIGHT: 220.68 LBS

## 2023-11-07 DIAGNOSIS — R60.9 PERIPHERAL EDEMA: ICD-10-CM

## 2023-11-07 DIAGNOSIS — R07.89 ATYPICAL CHEST PAIN: ICD-10-CM

## 2023-11-07 DIAGNOSIS — K80.20 GALLSTONES: ICD-10-CM

## 2023-11-07 DIAGNOSIS — R11.0 NAUSEA: ICD-10-CM

## 2023-11-07 PROCEDURE — 96374 THER/PROPH/DIAG INJ IV PUSH: CPT

## 2023-11-07 PROCEDURE — 96376 TX/PRO/DX INJ SAME DRUG ADON: CPT

## 2023-11-07 PROCEDURE — 80053 COMPREHEN METABOLIC PANEL: CPT

## 2023-11-07 PROCEDURE — 83880 ASSAY OF NATRIURETIC PEPTIDE: CPT

## 2023-11-07 PROCEDURE — 85025 COMPLETE CBC W/AUTO DIFF WBC: CPT

## 2023-11-07 PROCEDURE — 84703 CHORIONIC GONADOTROPIN ASSAY: CPT

## 2023-11-07 PROCEDURE — 85379 FIBRIN DEGRADATION QUANT: CPT

## 2023-11-07 PROCEDURE — 36415 COLL VENOUS BLD VENIPUNCTURE: CPT

## 2023-11-07 PROCEDURE — 84100 ASSAY OF PHOSPHORUS: CPT

## 2023-11-07 PROCEDURE — 93005 ELECTROCARDIOGRAM TRACING: CPT | Performed by: STUDENT IN AN ORGANIZED HEALTH CARE EDUCATION/TRAINING PROGRAM

## 2023-11-07 PROCEDURE — 99284 EMERGENCY DEPT VISIT MOD MDM: CPT

## 2023-11-07 PROCEDURE — 83735 ASSAY OF MAGNESIUM: CPT

## 2023-11-07 PROCEDURE — 84443 ASSAY THYROID STIM HORMONE: CPT

## 2023-11-07 PROCEDURE — 84484 ASSAY OF TROPONIN QUANT: CPT

## 2023-11-07 RX ORDER — ONDANSETRON 2 MG/ML
4 INJECTION INTRAMUSCULAR; INTRAVENOUS ONCE
Status: COMPLETED | OUTPATIENT
Start: 2023-11-08 | End: 2023-11-08

## 2023-11-07 ASSESSMENT — PAIN DESCRIPTION - DESCRIPTORS: DESCRIPTORS: ACHING

## 2023-11-07 ASSESSMENT — FIBROSIS 4 INDEX: FIB4 SCORE: 0.5

## 2023-11-08 ENCOUNTER — APPOINTMENT (OUTPATIENT)
Dept: RADIOLOGY | Facility: MEDICAL CENTER | Age: 33
End: 2023-11-08
Attending: STUDENT IN AN ORGANIZED HEALTH CARE EDUCATION/TRAINING PROGRAM
Payer: COMMERCIAL

## 2023-11-08 LAB
ALBUMIN SERPL BCP-MCNC: 3.9 G/DL (ref 3.2–4.9)
ALBUMIN/GLOB SERPL: 1.1 G/DL
ALP SERPL-CCNC: 98 U/L (ref 30–99)
ALT SERPL-CCNC: 21 U/L (ref 2–50)
AMORPH CRY #/AREA URNS HPF: PRESENT /HPF
ANION GAP SERPL CALC-SCNC: 11 MMOL/L (ref 7–16)
APPEARANCE UR: ABNORMAL
AST SERPL-CCNC: 16 U/L (ref 12–45)
BACTERIA #/AREA URNS HPF: NEGATIVE /HPF
BASOPHILS # BLD AUTO: 0.3 % (ref 0–1.8)
BASOPHILS # BLD: 0.03 K/UL (ref 0–0.12)
BILIRUB SERPL-MCNC: <0.2 MG/DL (ref 0.1–1.5)
BILIRUB UR QL STRIP.AUTO: NEGATIVE
BUN SERPL-MCNC: 10 MG/DL (ref 8–22)
CALCIUM ALBUM COR SERPL-MCNC: 9.3 MG/DL (ref 8.5–10.5)
CALCIUM SERPL-MCNC: 9.2 MG/DL (ref 8.4–10.2)
CHLORIDE SERPL-SCNC: 100 MMOL/L (ref 96–112)
CO2 SERPL-SCNC: 25 MMOL/L (ref 20–33)
COLOR UR: YELLOW
CREAT SERPL-MCNC: 0.46 MG/DL (ref 0.5–1.4)
D DIMER PPP IA.FEU-MCNC: 0.37 UG/ML (FEU) (ref 0–0.5)
EKG IMPRESSION: NORMAL
EOSINOPHIL # BLD AUTO: 0.25 K/UL (ref 0–0.51)
EOSINOPHIL NFR BLD: 2.2 % (ref 0–6.9)
EPI CELLS #/AREA URNS HPF: NEGATIVE /HPF
ERYTHROCYTE [DISTWIDTH] IN BLOOD BY AUTOMATED COUNT: 45.2 FL (ref 35.9–50)
GFR SERPLBLD CREATININE-BSD FMLA CKD-EPI: 130 ML/MIN/1.73 M 2
GLOBULIN SER CALC-MCNC: 3.6 G/DL (ref 1.9–3.5)
GLUCOSE SERPL-MCNC: 95 MG/DL (ref 65–99)
GLUCOSE UR STRIP.AUTO-MCNC: NEGATIVE MG/DL
HCG SERPL QL: NEGATIVE
HCT VFR BLD AUTO: 40.2 % (ref 37–47)
HGB BLD-MCNC: 13.1 G/DL (ref 12–16)
IMM GRANULOCYTES # BLD AUTO: 0.03 K/UL (ref 0–0.11)
IMM GRANULOCYTES NFR BLD AUTO: 0.3 % (ref 0–0.9)
KETONES UR STRIP.AUTO-MCNC: NEGATIVE MG/DL
LEUKOCYTE ESTERASE UR QL STRIP.AUTO: NEGATIVE
LYMPHOCYTES # BLD AUTO: 3.98 K/UL (ref 1–4.8)
LYMPHOCYTES NFR BLD: 34.6 % (ref 22–41)
MAGNESIUM SERPL-MCNC: 1.7 MG/DL (ref 1.5–2.5)
MCH RBC QN AUTO: 26.4 PG (ref 27–33)
MCHC RBC AUTO-ENTMCNC: 32.6 G/DL (ref 32.2–35.5)
MCV RBC AUTO: 80.9 FL (ref 81.4–97.8)
MICRO URNS: ABNORMAL
MONOCYTES # BLD AUTO: 0.86 K/UL (ref 0–0.85)
MONOCYTES NFR BLD AUTO: 7.5 % (ref 0–13.4)
NEUTROPHILS # BLD AUTO: 6.34 K/UL (ref 1.82–7.42)
NEUTROPHILS NFR BLD: 55.1 % (ref 44–72)
NITRITE UR QL STRIP.AUTO: NEGATIVE
NRBC # BLD AUTO: 0 K/UL
NRBC BLD-RTO: 0 /100 WBC (ref 0–0.2)
NT-PROBNP SERPL IA-MCNC: <36 PG/ML (ref 0–125)
PH UR STRIP.AUTO: 8 [PH] (ref 5–8)
PHOSPHATE SERPL-MCNC: 3.8 MG/DL (ref 2.5–4.5)
PLATELET # BLD AUTO: 374 K/UL (ref 164–446)
PMV BLD AUTO: 9.8 FL (ref 9–12.9)
POTASSIUM SERPL-SCNC: 4 MMOL/L (ref 3.6–5.5)
PROT SERPL-MCNC: 7.5 G/DL (ref 6–8.2)
PROT UR QL STRIP: NEGATIVE MG/DL
RBC # BLD AUTO: 4.97 M/UL (ref 4.2–5.4)
RBC # URNS HPF: NORMAL /HPF
RBC UR QL AUTO: NEGATIVE
SODIUM SERPL-SCNC: 136 MMOL/L (ref 135–145)
SP GR UR STRIP.AUTO: 1.01
TROPONIN T SERPL-MCNC: <6 NG/L (ref 6–19)
TSH SERPL DL<=0.005 MIU/L-ACNC: 2 UIU/ML (ref 0.38–5.33)
WBC # BLD AUTO: 11.5 K/UL (ref 4.8–10.8)
WBC #/AREA URNS HPF: NORMAL /HPF

## 2023-11-08 PROCEDURE — 700102 HCHG RX REV CODE 250 W/ 637 OVERRIDE(OP): Performed by: STUDENT IN AN ORGANIZED HEALTH CARE EDUCATION/TRAINING PROGRAM

## 2023-11-08 PROCEDURE — 700111 HCHG RX REV CODE 636 W/ 250 OVERRIDE (IP): Mod: JZ | Performed by: STUDENT IN AN ORGANIZED HEALTH CARE EDUCATION/TRAINING PROGRAM

## 2023-11-08 PROCEDURE — A9270 NON-COVERED ITEM OR SERVICE: HCPCS | Performed by: STUDENT IN AN ORGANIZED HEALTH CARE EDUCATION/TRAINING PROGRAM

## 2023-11-08 PROCEDURE — 71045 X-RAY EXAM CHEST 1 VIEW: CPT

## 2023-11-08 PROCEDURE — 81001 URINALYSIS AUTO W/SCOPE: CPT

## 2023-11-08 PROCEDURE — 76705 ECHO EXAM OF ABDOMEN: CPT

## 2023-11-08 RX ORDER — ONDANSETRON 4 MG/1
4 TABLET, ORALLY DISINTEGRATING ORAL EVERY 6 HOURS PRN
Qty: 10 TABLET | Refills: 0 | Status: SHIPPED | OUTPATIENT
Start: 2023-11-08

## 2023-11-08 RX ORDER — ONDANSETRON 2 MG/ML
4 INJECTION INTRAMUSCULAR; INTRAVENOUS ONCE
Status: COMPLETED | OUTPATIENT
Start: 2023-11-08 | End: 2023-11-08

## 2023-11-08 RX ADMIN — ONDANSETRON 4 MG: 2 INJECTION INTRAMUSCULAR; INTRAVENOUS at 03:30

## 2023-11-08 RX ADMIN — ONDANSETRON 4 MG: 2 INJECTION INTRAMUSCULAR; INTRAVENOUS at 00:19

## 2023-11-08 RX ADMIN — LIDOCAINE HYDROCHLORIDE 30 ML: 20 SOLUTION OROPHARYNGEAL at 03:15

## 2023-11-08 ASSESSMENT — HEART SCORE
HEART SCORE: 2
ECG: NON-SPECIFIC REPOLARIZATION DISTURBANCE
TROPONIN: LESS THAN OR EQUAL TO NORMAL LIMIT
AGE: <45
HISTORY: SLIGHTLY SUSPICIOUS
RISK FACTORS: 1-2 RISK FACTORS

## 2023-11-08 NOTE — ED TRIAGE NOTES
"Chief Complaint   Patient presents with    Leg Swelling     Pt reports bilateral lower leg swelling and chest tightness starting last week. Pt reports chest tightness that has been constant today. Pt reports left hand numbness that started 2000 tonight.     Numbness    Chest Pressure     BP (!) 149/95   Pulse 91   Temp 36.8 °C (98.2 °F) (Temporal)   Resp 18   Ht 1.549 m (5' 1\")   Wt 100 kg (220 lb 10.9 oz)   SpO2 94%   Breastfeeding Yes   BMI 41.70 kg/m²     "

## 2023-11-08 NOTE — ED PROVIDER NOTES
ED Provider Note    CHIEF COMPLAINT  Chief Complaint   Patient presents with    Leg Swelling     Pt reports bilateral lower leg swelling and chest tightness starting last week. Pt reports chest tightness that has been constant today. Pt reports left hand numbness that started 2000 tonight.     Numbness    Chest Pressure       EXTERNAL RECORDS REVIEWED  Inpatient Notes start summary 1/31/2023, 202 1/20/2023, history of symptomatic bradycardia, Mobitz type II heart block, while pregnant, history of paroxysmal A-fib, had abnormal Zio patch results cardiology felt it was actually Mobitz type I, and not Mobitz type II.    HPI/ROS  LIMITATION TO HISTORY   Select: : None      Sherrell Toro is a 33 y.o. female who presents with intermittent chest tightness lasting for a few seconds at a time, however today became persistent, nonradiating, no alleviating exacerbating factors denies exertional component, as well as paresthesias in the left hand which started this evening, and bilateral leg swelling over the past several days she notes a history of paroxysmal A-fib while pregnant, was not started on medication as it was in the peripartum period.  Denies vomiting, fever, chills.  Reports nausea.  Was prescribed a water pill, unsure which type of diuretic, by her primary but has not started it yet.    PAST MEDICAL HISTORY   has a past medical history of Depression.    SURGICAL HISTORY   has a past surgical history that includes primary c section; other (Right); myomectomy; and ovarian cystectomy (Right).    FAMILY HISTORY  No family history on file.    SOCIAL HISTORY  Social History     Tobacco Use    Smoking status: Never    Smokeless tobacco: Never   Vaping Use    Vaping Use: Never used   Substance and Sexual Activity    Alcohol use: No    Drug use: No    Sexual activity: Not on file       CURRENT MEDICATIONS  Home Medications       Reviewed by Arielle Byrd R.N. (Registered Nurse) on 11/07/23 at 2153  Med  "List Status: Partial     Medication Last Dose Status   benzonatate (TESSALON) 100 MG Cap  Active                    ALLERGIES  No Known Allergies    PHYSICAL EXAM  VITAL SIGNS: BP (!) 149/95   Pulse 91   Temp 36.8 °C (98.2 °F) (Temporal)   Resp 18   Ht 1.549 m (5' 1\")   Wt 100 kg (220 lb 10.9 oz)   SpO2 94%   Breastfeeding Yes   BMI 41.70 kg/m²    General: Pleasant female, nontoxic-appearing, uncomfortable.  Head: Normocephalic atraumatic  Eyes: Extraocular motion intact  Neck: Supple, no rigidity  Cardiovascular: Regular rate and rhythm no murmurs rubs or gallops  Respiratory: Clear to auscultation bilaterally, equal chest rise and fall, no increased work of breathing  Abdomen: Soft nontender no guarding.  Does have increased nausea with palpation of the epigastrium.  Negative Messer sign.  Musculoskeletal: Warm and well perfused, 1+ symmetric peripheral edema  Neuro: Alert, no focal deficits  Integumentary: No wounds or rashes      DIAGNOSTIC STUDIES / PROCEDURES  EKG  I have independently interpreted this EKG  Results for orders placed or performed during the hospital encounter of 23   EKG   Result Value Ref Range    Report       [facility]    Test Date:  2023  Pt Name:    MARKUS HERNANDEZ        Department: [facility]  MRN:        [id number]                      Room:  Gender:     Female                       Technician: [id number]  :        1990                   Requested By:ER TRIAGE PROTOCOL  Order #:    [id number]                    Reading MD: Ceasar Marquis    Measurements  Intervals                                Axis  Rate:       94                           P:          43  LA:         147                          QRS:        50  QRSD:       98                           T:          28  QT:         368  QTc:        461    Interpretive Statements  EKG: Normal sinus rhythm, rate of 94, normal axis, no ST elevations or  depressions.  No dropped " beats or evidence of heart block.  Not significantly  changed from prior 1/31/2023.  Q waves, lead III, aVF are unchanged.  Electronically Signed On 11- 02:56:45 PST by Ceasar Marquis       Point of Care Ultrasound    ED POINT OF CARE ULTRASOUND: LIMITED CARDIAC    Indication for exam: Edema chest pressure  LVEF: normal  Pericardial Effusion:not present  RV Strain:not present  Pulmonary B Lines: Not assessed    Image retained through [facility] as seen below:           This study is a limited ultrasound examination performed and interpreted to evaluate for limited conditions as outlined above. There may be other clinically important information contained in the images that is outside this scope. When clinically warranted, a comprehensive ultrasound through the appropriate department is considered.      LABS    Leukocytosis, nonspecific in the setting of nausea, hCG negative, urinalysis not consistent with infection, D-dimer negative, troponin is negative, otherwise unrevealing.  No alk phos elevation, no elevation of T. bili, transaminases.    RADIOLOGY  I have independently interpreted the diagnostic imaging associated with this visit and am waiting the final reading from the radiologist.   My preliminary interpretation is as follows: No acute cardiopulmonary abnormalities on chest x-ray  Radiologist interpretation:   US-RUQ   Final Result         1.  Echogenic foci in the gallbladder suggesting poorly calcified stone. There is gallbladder wall thickening however the gallbladder is contracted and gallbladder wall thickening is nonspecific in the setting of gallbladder contraction.   2.  Hepatomegaly   3.  Echogenic liver compatible with fatty change versus fibrosis.      DX-CHEST-PORTABLE (1 VIEW)   Final Result         1.  No acute cardiopulmonary disease.        Reassessed abdomen after imaging resulted, no right upper quadrant tenderness, negative Messer sign    COURSE & MEDICAL DECISION MAKING    ED  Observation Status? Yes; I am placing the patient in to an observation status due to a diagnostic uncertainty as well as therapeutic intensity. Patient placed in observation status at 6:35 AM, 11/8/2023.     Observation plan is as follows: Analgesics, antiemetics, labs, ultrasound, chest x-ray.    Upon Reevaluation, the patient's condition has: Improved; and will be discharged.    Patient discharged from ED Observation status at Filed 11/08/2023 0257     INITIAL ASSESSMENT, COURSE AND PLAN  Care Narrative: 33-year-old female presenting with chest discomfort, paresthesias in the left hand, and report of several days of worsening edema in the lower extremities for which she was prescribed a water pill has not started it yet.  She has a history of paroxysmal A-fib and potentially a second-degree AV block, likely Mobitz type II per cardiology notation vital signs here, are generally reassuring, her exam is notable for no significant cardiopulmonary abnormality,    Broad differential included was not limited to: Arrhythmia, electrolyte abnormality, atypical ACS, cholecystitis, gastritis, gastroenteritis, biliary colic, UTI.      Nausea improved after initial Zofran, patient subsequently had recurring symptoms, abdominal exam is fairly benign however given leukocytosis, obtained right upper quadrant ultrasound to evaluate for acute biliary pathology was significant for stones, with a decompressed gallbladder, her abdomen is reassessed, and she was told of these findings and need for follow-up with surgery, as well as her PCP for her symptoms.  We discussed strict return precautions for signs symptoms of cholecystitis, palpitations, worsening symptoms inability to tolerate p.o.  She verbalized understanding agreement plan of care at this time and was discharged home in no acute distress.      DISPOSITION AND DISCUSSIONS  Discharged home with outpatient follow-up.    Discussion of management with other QHP or appropriate  source(s): None       Decision tools and prescription drugs considered including, but not limited to: HEART Score 2 .    FINAL DIAGNOSIS  1. Peripheral edema    2. Atypical chest pain    3. Gallstones    4. Nausea           Electronically signed by: Ceasar Marquis M.D., 11/7/2023 11:30 PM

## 2023-11-08 NOTE — ED NOTES
Urine sample sent to lab.    Labs resulted.  Pending US study to be done.    Patient updated with her plan of care.

## 2023-11-08 NOTE — DISCHARGE INSTRUCTIONS
Follow-up with your doctor to discuss additional work-up as needed, it is recommended you start the water pills that were prescribed.  They may consider echocardiogram based on your response, return if you feel you cannot breathe, develop severe worsening pain, other new symptoms you find concerning.

## 2024-01-05 ENCOUNTER — OFFICE VISIT (OUTPATIENT)
Dept: URGENT CARE | Facility: CLINIC | Age: 34
End: 2024-01-05
Payer: COMMERCIAL

## 2024-01-05 VITALS
BODY MASS INDEX: 41.54 KG/M2 | RESPIRATION RATE: 20 BRPM | DIASTOLIC BLOOD PRESSURE: 80 MMHG | SYSTOLIC BLOOD PRESSURE: 122 MMHG | TEMPERATURE: 98 F | WEIGHT: 220 LBS | HEIGHT: 61 IN | HEART RATE: 96 BPM | OXYGEN SATURATION: 98 %

## 2024-01-05 DIAGNOSIS — J02.9 PHARYNGITIS, UNSPECIFIED ETIOLOGY: ICD-10-CM

## 2024-01-05 DIAGNOSIS — R05.1 ACUTE COUGH: ICD-10-CM

## 2024-01-05 LAB — S PYO DNA SPEC NAA+PROBE: NOT DETECTED

## 2024-01-05 PROCEDURE — 87651 STREP A DNA AMP PROBE: CPT | Performed by: PHYSICIAN ASSISTANT

## 2024-01-05 PROCEDURE — 3079F DIAST BP 80-89 MM HG: CPT | Performed by: PHYSICIAN ASSISTANT

## 2024-01-05 PROCEDURE — 99213 OFFICE O/P EST LOW 20 MIN: CPT | Performed by: PHYSICIAN ASSISTANT

## 2024-01-05 PROCEDURE — 3074F SYST BP LT 130 MM HG: CPT | Performed by: PHYSICIAN ASSISTANT

## 2024-01-05 ASSESSMENT — FIBROSIS 4 INDEX: FIB4 SCORE: 0.3

## 2024-01-06 NOTE — PROGRESS NOTES
"Subjective:   Sherrell Toro is a 33 y.o. female who presents for Cough (3 days), Sinusitis (3 days), and Pharyngitis (3 days)      HPI  The patient presents to the Urgent Care with complaints of URI symptoms onset 3 days ago.  She reports of a sore throat, left ear pain, nasal congestion, cough.  Subjective fever.  Shortness of breath at night due to congestion.  Denies an chest pain, vomiting, diarrhea. Daughter is congested today.   History of strep.       Past Medical History:   Diagnosis Date    Depression      No Known Allergies     Objective:     /80   Pulse 96   Temp 36.7 °C (98 °F) (Temporal)   Resp 20   Ht 1.549 m (5' 1\")   Wt 99.8 kg (220 lb)   SpO2 98%   BMI 41.57 kg/m²     Physical Exam  Vitals reviewed.   Constitutional:       General: She is not in acute distress.     Appearance: Normal appearance. She is not ill-appearing or toxic-appearing.   HENT:      Right Ear: Tympanic membrane, ear canal and external ear normal.      Left Ear: Tympanic membrane, ear canal and external ear normal.      Mouth/Throat:      Mouth: Mucous membranes are moist.      Pharynx: Uvula midline. Posterior oropharyngeal erythema present. No pharyngeal swelling, oropharyngeal exudate or uvula swelling.      Tonsils: No tonsillar exudate or tonsillar abscesses. 1+ on the right. 1+ on the left.   Eyes:      Conjunctiva/sclera: Conjunctivae normal.   Cardiovascular:      Rate and Rhythm: Normal rate and regular rhythm.      Heart sounds: Normal heart sounds.   Pulmonary:      Effort: Pulmonary effort is normal. No respiratory distress.      Breath sounds: Normal breath sounds. No wheezing, rhonchi or rales.   Musculoskeletal:      Cervical back: Neck supple. No rigidity.   Lymphadenopathy:      Cervical: No cervical adenopathy.   Skin:     General: Skin is warm and dry.   Neurological:      General: No focal deficit present.      Mental Status: She is alert and oriented to person, place, and time. "   Psychiatric:         Mood and Affect: Mood normal.         Behavior: Behavior normal.       Results for orders placed or performed in visit on 01/05/24   POCT CEPHEID GROUP A STREP - PCR   Result Value Ref Range    POC Group A Strep, PCR Not Detected Not Detected, Invalid         Diagnosis and associated orders:     1. Acute cough    2. Pharyngitis, unspecified etiology  - POCT CEPHEID GROUP A STREP - PCR       Comments/MDM:     The patient's presenting symptoms and exam findings are consistent with a upper respiratory infection most likely viral etiology. They have a normal pulse oximetry on room air, afebrile, and a normal pulmonary exam. Overall, the patient is very well appearing. I do not feel that this patient would benefit from antibiotics at this time.   Recommended symptomatic and supportive care at this time that includes plenty of fluids, rest, Tylenol/Ibuprofen for pain/fever, warm salt water gargles for sore throat, OTC cough and decongestant medication, Flonase, nasal saline washes. If no improvement in 5-7 days or any worsening symptoms, recommend returning to the urgent care for re-evaluation.          I personally reviewed prior external notes and test results pertinent to today's visit. Pathogenesis of diagnosis discussed including typical length and natural progression. Supportive care, natural history, differential diagnoses, and indications for immediate follow-up discussed. Patient expresses understanding and agrees to plan. Patient denies any other questions or concerns.     Follow-up with the primary care physician for recheck, reevaluation, and consideration of further management.    Please note that this dictation was created using voice recognition software. I have made a reasonable attempt to correct obvious errors, but I expect that there are errors of grammar and possibly content that I did not discover before finalizing the note.    This note was electronically signed by Sha  ALBINO Arango

## 2024-02-24 ENCOUNTER — OFFICE VISIT (OUTPATIENT)
Dept: URGENT CARE | Facility: PHYSICIAN GROUP | Age: 34
End: 2024-02-24
Payer: COMMERCIAL

## 2024-02-24 VITALS
TEMPERATURE: 96.7 F | OXYGEN SATURATION: 97 % | SYSTOLIC BLOOD PRESSURE: 110 MMHG | DIASTOLIC BLOOD PRESSURE: 72 MMHG | HEART RATE: 100 BPM | BODY MASS INDEX: 40.97 KG/M2 | HEIGHT: 61 IN | RESPIRATION RATE: 18 BRPM | WEIGHT: 217 LBS

## 2024-02-24 DIAGNOSIS — J02.9 SORE THROAT: ICD-10-CM

## 2024-02-24 DIAGNOSIS — J02.0 STREP PHARYNGITIS: ICD-10-CM

## 2024-02-24 LAB — S PYO DNA SPEC NAA+PROBE: DETECTED

## 2024-02-24 PROCEDURE — 99213 OFFICE O/P EST LOW 20 MIN: CPT | Performed by: PHYSICIAN ASSISTANT

## 2024-02-24 PROCEDURE — 3078F DIAST BP <80 MM HG: CPT | Performed by: PHYSICIAN ASSISTANT

## 2024-02-24 PROCEDURE — 3074F SYST BP LT 130 MM HG: CPT | Performed by: PHYSICIAN ASSISTANT

## 2024-02-24 PROCEDURE — 87651 STREP A DNA AMP PROBE: CPT | Performed by: PHYSICIAN ASSISTANT

## 2024-02-24 RX ORDER — HYDROCHLOROTHIAZIDE 12.5 MG/1
12.5 CAPSULE, GELATIN COATED ORAL DAILY
COMMUNITY

## 2024-02-24 RX ORDER — AMOXICILLIN 500 MG/1
500 CAPSULE ORAL 2 TIMES DAILY
Qty: 20 CAPSULE | Refills: 0 | Status: SHIPPED | OUTPATIENT
Start: 2024-02-24 | End: 2024-03-05

## 2024-02-24 ASSESSMENT — ENCOUNTER SYMPTOMS
EYE DISCHARGE: 0
FEVER: 1
TROUBLE SWALLOWING: 1
CHILLS: 1
SORE THROAT: 1
DIARRHEA: 0
SHORTNESS OF BREATH: 0
HEADACHES: 1
COUGH: 0
NAUSEA: 1
MYALGIAS: 1
EYE REDNESS: 0
VOMITING: 0

## 2024-02-24 ASSESSMENT — FIBROSIS 4 INDEX: FIB4 SCORE: 0.3

## 2024-02-24 NOTE — PROGRESS NOTES
Subjective     Sherrell Toro is a 33 y.o. female who presents with Body Aches (Chills, fever, sore throat /X 3 days )            Pharyngitis   This is a new problem. Episode onset: x 2 days ago. The problem has been unchanged. Neither side of throat is experiencing more pain than the other. The maximum temperature recorded prior to her arrival was 102 - 102.9 F. The pain is moderate. Associated symptoms include headaches, a plugged ear sensation and trouble swallowing (The patient reports increased pain with swallowing.). Pertinent negatives include no congestion, coughing, diarrhea, drooling, ear pain, shortness of breath or vomiting. She has had no exposure to strep. Treatments tried: OTC Theraflu.     PMH:  has a past medical history of Depression.  MEDS:   Current Outpatient Medications:     hydrochlorothiazide (MICROZIDE) 12.5 MG capsule, Take 12.5 mg by mouth every day., Disp: , Rfl:     ondansetron (ZOFRAN ODT) 4 MG TABLET DISPERSIBLE, Take 1 Tablet by mouth every 6 hours as needed for Nausea/Vomiting., Disp: 10 Tablet, Rfl: 0    benzonatate (TESSALON) 100 MG Cap, Take 2 Capsules by mouth 3 times a day as needed for Cough. (Patient not taking: Reported on 2/24/2024), Disp: 60 Capsule, Rfl: 0  ALLERGIES: No Known Allergies  SURGHX:   Past Surgical History:   Procedure Laterality Date    MYOMECTOMY      OTHER Right     myoectomy with Rt. cyst removed    OVARIAN CYSTECTOMY Right     PRIMARY C SECTION       SOCHX:  reports that she has never smoked. She has never used smokeless tobacco. She reports that she does not drink alcohol and does not use drugs.  FH: Family history was reviewed, no pertinent findings to report      Review of Systems   Constitutional:  Positive for chills and fever.   HENT:  Positive for sore throat and trouble swallowing (The patient reports increased pain with swallowing.). Negative for congestion, drooling and ear pain.    Eyes:  Negative for discharge and redness.  "  Respiratory:  Negative for cough and shortness of breath.    Gastrointestinal:  Positive for nausea. Negative for diarrhea and vomiting.   Musculoskeletal:  Positive for myalgias.   Neurological:  Positive for headaches.              Objective     /72   Pulse 100   Temp 35.9 °C (96.7 °F) (Temporal)   Resp 18   Ht 1.549 m (5' 1\")   Wt 98.4 kg (217 lb)   SpO2 97%   BMI 41.00 kg/m²      Physical Exam  Constitutional:       General: She is not in acute distress.     Appearance: Normal appearance. She is not ill-appearing.   HENT:      Head: Normocephalic and atraumatic.      Right Ear: Tympanic membrane, ear canal and external ear normal.      Left Ear: Tympanic membrane, ear canal and external ear normal.      Nose: Nose normal.      Mouth/Throat:      Mouth: Mucous membranes are moist.      Pharynx: Oropharynx is clear. Uvula midline. Posterior oropharyngeal erythema present.      Tonsils: No tonsillar exudate. 2+ on the right. 2+ on the left.   Eyes:      Extraocular Movements: Extraocular movements intact.      Conjunctiva/sclera: Conjunctivae normal.   Cardiovascular:      Rate and Rhythm: Normal rate and regular rhythm.      Heart sounds: Normal heart sounds.   Pulmonary:      Effort: Pulmonary effort is normal. No respiratory distress.      Breath sounds: Normal breath sounds. No wheezing.   Musculoskeletal:         General: Normal range of motion.      Cervical back: Normal range of motion and neck supple.   Skin:     General: Skin is warm and dry.   Neurological:      Mental Status: She is alert and oriented to person, place, and time.                  Progress:  Results for orders placed or performed in visit on 02/24/24   POCT GROUP A STREP, PCR   Result Value Ref Range    POC Group A Strep, PCR Detected (A) Not Detected, Invalid                  Assessment & Plan        1. Sore throat  - POCT GROUP A STREP, PCR    2. Strep pharyngitis  - amoxicillin (AMOXIL) 500 MG Cap; Take 1 Capsule by mouth " 2 times a day for 10 days.  Dispense: 20 Capsule; Refill: 0    Differential diagnoses, supportive care, and indications for immediate follow-up discussed with patient.   Instructed to return to clinic or nearest emergency department for any change in condition, further concerns, or worsening of symptoms.    OTC Tylenol or Motrin for fever/discomfort.  OTC Supportive Care for Sore Throat - warm salt water gargles, sore throat lozenges, warm lemon water, and/or tea.  Drink plenty of fluids  Follow-up with PCP   Return to clinic or go to the ED if symptoms worsen or fail to improve, or if patient should develop worsening/increasing sore throat, difficulty swallowing, drooling, change in voice, swollen glands, shortness of breath, ear pain, cough, congestion, fever/chills, and/or any concerning symptoms.    Discussed plan with the patient, and she agrees to the above.     I personally reviewed prior external notes and test results pertinent to today's visit.  I have independently reviewed and interpreted all diagnostics ordered during this urgent care visit.     Please note that this dictation was created using voice recognition software. I have made every reasonable attempt to correct obvious errors, but I expect that there may be errors of grammar and possibly content that I did not discover before finalizing the note.       This note was electronically signed by Gwen Rodriguez PA-C

## 2024-07-05 ENCOUNTER — NON-PROVIDER VISIT (OUTPATIENT)
Dept: URGENT CARE | Facility: CLINIC | Age: 34
End: 2024-07-05

## 2024-07-05 DIAGNOSIS — Z02.1 PRE-EMPLOYMENT DRUG SCREENING: ICD-10-CM

## 2024-07-05 LAB
AMP AMPHETAMINE: NORMAL
BAR BARBITURATES: NORMAL
BZO BENZODIAZEPINES: NORMAL
COC COCAINE: NORMAL
INT CON NEG: NEGATIVE
INT CON POS: POSITIVE
MDMA ECSTASY: NORMAL
MET METHAMPHETAMINES: NORMAL
MTD METHADONE: NORMAL
OPI OPIATES: NORMAL
OXY OXYCODONE: NORMAL
PCP PHENCYCLIDINE: NORMAL
POC URINE DRUG SCREEN OCDRS: NEGATIVE
THC: NORMAL

## 2024-07-05 PROCEDURE — 80305 DRUG TEST PRSMV DIR OPT OBS: CPT | Performed by: NURSE PRACTITIONER

## 2024-11-04 ENCOUNTER — OFFICE VISIT (OUTPATIENT)
Dept: MEDICAL GROUP | Facility: PHYSICIAN GROUP | Age: 34
End: 2024-11-04
Payer: COMMERCIAL

## 2024-11-04 VITALS
BODY MASS INDEX: 39.88 KG/M2 | SYSTOLIC BLOOD PRESSURE: 118 MMHG | DIASTOLIC BLOOD PRESSURE: 76 MMHG | HEART RATE: 96 BPM | OXYGEN SATURATION: 97 % | TEMPERATURE: 97.6 F | WEIGHT: 211.2 LBS | HEIGHT: 61 IN | RESPIRATION RATE: 16 BRPM

## 2024-11-04 DIAGNOSIS — N39.41 URGE INCONTINENCE OF URINE: ICD-10-CM

## 2024-11-04 DIAGNOSIS — E66.3 OVERWEIGHT: ICD-10-CM

## 2024-11-04 DIAGNOSIS — I48.91 ATRIAL FIBRILLATION, UNSPECIFIED TYPE (HCC): ICD-10-CM

## 2024-11-04 DIAGNOSIS — I44.1 SECOND DEGREE HEART BLOCK: ICD-10-CM

## 2024-11-04 DIAGNOSIS — Z11.3 SCREENING EXAMINATION FOR SEXUALLY TRANSMITTED DISEASE: ICD-10-CM

## 2024-11-04 DIAGNOSIS — Z11.59 NEED FOR HEPATITIS C SCREENING TEST: ICD-10-CM

## 2024-11-04 DIAGNOSIS — R73.03 PREDIABETES: ICD-10-CM

## 2024-11-04 DIAGNOSIS — I44.1 WENCKEBACH: ICD-10-CM

## 2024-11-04 DIAGNOSIS — R93.89 ABNORMAL ULTRASOUND: ICD-10-CM

## 2024-11-04 PROBLEM — N92.1 METRORRHAGIA: Status: ACTIVE | Noted: 2024-11-04

## 2024-11-04 PROBLEM — Z3A.31 31 WEEKS GESTATION OF PREGNANCY: Status: RESOLVED | Noted: 2023-01-31 | Resolved: 2024-11-04

## 2024-11-04 PROBLEM — N92.1 METRORRHAGIA: Status: RESOLVED | Noted: 2024-11-04 | Resolved: 2024-11-04

## 2024-11-04 PROBLEM — R73.02 IMPAIRED GLUCOSE TOLERANCE: Status: RESOLVED | Noted: 2021-04-21 | Resolved: 2024-11-04

## 2024-11-04 PROBLEM — Z20.828 EXPOSURE TO THE FLU: Status: RESOLVED | Noted: 2019-12-20 | Resolved: 2024-11-04

## 2024-11-04 PROBLEM — N80.9 ENDOMETRIOSIS: Status: RESOLVED | Noted: 2024-11-04 | Resolved: 2024-11-04

## 2024-11-04 PROBLEM — R00.2 PALPITATION: Status: RESOLVED | Noted: 2022-11-30 | Resolved: 2024-11-04

## 2024-11-04 PROBLEM — N80.9 ENDOMETRIOSIS: Status: ACTIVE | Noted: 2024-11-04

## 2024-11-04 PROCEDURE — 3078F DIAST BP <80 MM HG: CPT | Performed by: PHYSICIAN ASSISTANT

## 2024-11-04 PROCEDURE — 3074F SYST BP LT 130 MM HG: CPT | Performed by: PHYSICIAN ASSISTANT

## 2024-11-04 PROCEDURE — 99214 OFFICE O/P EST MOD 30 MIN: CPT | Performed by: PHYSICIAN ASSISTANT

## 2024-11-04 RX ORDER — ONDANSETRON 4 MG/1
4 TABLET, FILM COATED ORAL
COMMUNITY
End: 2024-11-04

## 2024-11-04 ASSESSMENT — ENCOUNTER SYMPTOMS
CHILLS: 0
FEVER: 0
SHORTNESS OF BREATH: 0

## 2024-11-04 ASSESSMENT — FIBROSIS 4 INDEX: FIB4 SCORE: 0.3

## 2024-11-04 ASSESSMENT — PATIENT HEALTH QUESTIONNAIRE - PHQ9: CLINICAL INTERPRETATION OF PHQ2 SCORE: 0

## 2024-11-04 NOTE — ASSESSMENT & PLAN NOTE
1.  Echogenic foci in the gallbladder suggesting poorly calcified stone. There is gallbladder wall thickening however the gallbladder is contracted and gallbladder wall thickening is nonspecific in the setting of gallbladder contraction.  2.  Hepatomegaly  3.  Echogenic liver compatible with fatty change versus fibrosis.

## 2024-11-04 NOTE — PROGRESS NOTES
SUBJECTIVE:     CC: establish care; prior UC    HPI:   Sherrell presents today with the following:    ASSESSMENT & PLAN by Problem:     Problem   Urge Incontinence of Urine    Urinary urgency  Leakage when trying hold it  Advised follow up with Dr. Rice as she already sees her.     Prediabetes    Chronic, stable.  A1c 5.6 around 2021 per  Due to repeat labs.     Abnormal Ultrasound    Most suspicious for fatty liver.  Long discussion with the patient regarding dietary changes.     Overweight    Chronic, uncontrolled.  Increase plant-based foods, decrease animal-based foods.  Increase daily activity, aiming for 30-45 minutes brisk exercise daily.  Recommend using some sort of tracking qing to see what she is eating.  Follow-up for further concerns.     Metrorrhagia (Resolved)   Endometriosis (Resolved)   Wenckebach (Resolved)    Resolved.  Discharged from cardiology.     31 Weeks Gestation of Pregnancy (Resolved)   Second Degree Heart Block (Resolved)    Resolved.  Discharged from cardiology.     Atrial Fibrillation (Hcc) (Resolved)    Resolved.  Discharged from cardiology.     Palpitation (Resolved)   Impaired Glucose Tolerance (Resolved)   Exposure to The Flu (Resolved)     Tdap: 2023 after delivery  Influenza: has an appointment to get her flu shot    Pap: Sees Dr. Conchita Rice.     Repeat labs around January 2025.      Return if symptoms worsen or fail to improve, for lab discussion; overweight.        Healthcare Maintenance:      HPI:     Problem List Items Addressed This Visit       Abnormal ultrasound       1.  Echogenic foci in the gallbladder suggesting poorly calcified stone. There is gallbladder wall thickening however the gallbladder is contracted and gallbladder wall thickening is nonspecific in the setting of gallbladder contraction.  2.  Hepatomegaly  3.  Echogenic liver compatible with fatty change versus fibrosis.         RESOLVED: Atrial fibrillation (HCC)    Overweight     Chronic,  "uncontrolled.  Since delivery her child 3/2023 has had a hard time losing weight.  Currently eating higher protein, increase water intake.  Eating more at home.  Eating more fruits/veg.  Activity: tries to go for walks but has been having some family health issues affecting this.         Relevant Orders    CBC WITH DIFFERENTIAL    Comp Metabolic Panel    Lipid Profile    TSH WITH REFLEX TO FT4    Prediabetes    Relevant Orders    HEMOGLOBIN A1C    RESOLVED: Second degree heart block    Urge incontinence of urine     Urinary urgency  Leakage when trying hold it  Advised follow up with Dr. Rice as she already sees her         RESOLVED: Bret     Other Visit Diagnoses       Screening examination for sexually transmitted disease        Relevant Orders    HIV AG/AB COMBO ASSAY SCREENING    Need for hepatitis C screening test        Relevant Orders    HEP C VIRUS ANTIBODY                   ROS:  Review of Systems   Constitutional:  Negative for chills and fever.   Respiratory:  Negative for shortness of breath.    Cardiovascular:  Negative for chest pain.       OBJECTIVE:     Exam:  /76 (BP Location: Left arm, Patient Position: Sitting, BP Cuff Size: Large adult)   Pulse 96   Temp 36.4 °C (97.6 °F) (Temporal)   Resp 16   Ht 1.549 m (5' 1\")   Wt 95.8 kg (211 lb 3.2 oz)   LMP 09/15/2023 (Exact Date)   SpO2 97%   Breastfeeding No   BMI 39.91 kg/m²  Body mass index is 39.91 kg/m².    Physical Exam  Vitals reviewed.   Constitutional:       General: She is not in acute distress.     Appearance: Normal appearance.   Pulmonary:      Effort: Pulmonary effort is normal.   Neurological:      General: No focal deficit present.      Mental Status: She is alert.   Psychiatric:         Mood and Affect: Mood normal.         Behavior: Behavior normal.         Judgment: Judgment normal.           CHART REVIEW:     Labs:             A stress test was performed following the Victor Manuel protocol, with the patient reaching " stage 3.   The patient reached the end of the protocol.   The patient experienced no angina during the stress test.   The patient's response to exercise was adequate for diagnosis. Blood pressure demonstrated a normal response to exercise. Heart rate demonstrated a normal response to exercise. Overall, the patient's exercise capacity was normal.   The patient exercised for 6:20 minutes, 7.5 METS achieved. Maximum heart rate was 160 bpm, 85% of Maximum predicted       Heart rate ranges were from 57 - 144 beats per minute. The average heart   rate was 92 bpm.     Supraventricular ectopy: Rare (<1%).   Ventricular ectopy: Rare (<1%)   No pause or AFIB     Conclusion:   No AFIB heart rate.       1.  Echogenic foci in the gallbladder suggesting poorly calcified stone. There is gallbladder wall thickening however the gallbladder is contracted and gallbladder wall thickening is nonspecific in the setting of gallbladder contraction.  2.  Hepatomegaly  3.  Echogenic liver compatible with fatty change versus fibrosis.           Please note that this dictation was created using voice recognition software. I have made every reasonable attempt to correct obvious errors, but I expect that there are errors of grammar and possibly content that I did not discover before finalizing the note.

## 2024-11-04 NOTE — ASSESSMENT & PLAN NOTE
Urinary urgency  Leakage when trying hold it  Advised follow up with Dr. Rice as she already sees her

## 2024-11-04 NOTE — ASSESSMENT & PLAN NOTE
Chronic, uncontrolled.  Since delivery her child 3/2023 has had a hard time losing weight.  Currently eating higher protein, increase water intake.  Eating more at home.  Eating more fruits/veg.  Activity: tries to go for walks but has been having some family health issues affecting this.

## 2024-11-12 ENCOUNTER — OFFICE VISIT (OUTPATIENT)
Dept: URGENT CARE | Facility: PHYSICIAN GROUP | Age: 34
End: 2024-11-12
Payer: COMMERCIAL

## 2024-11-12 VITALS
RESPIRATION RATE: 16 BRPM | DIASTOLIC BLOOD PRESSURE: 66 MMHG | WEIGHT: 209.4 LBS | HEIGHT: 61 IN | SYSTOLIC BLOOD PRESSURE: 100 MMHG | TEMPERATURE: 98.1 F | HEART RATE: 116 BPM | BODY MASS INDEX: 39.53 KG/M2 | OXYGEN SATURATION: 95 %

## 2024-11-12 DIAGNOSIS — J02.0 PHARYNGITIS DUE TO STREPTOCOCCUS SPECIES: ICD-10-CM

## 2024-11-12 LAB — S PYO DNA SPEC NAA+PROBE: DETECTED

## 2024-11-12 PROCEDURE — 99213 OFFICE O/P EST LOW 20 MIN: CPT | Performed by: NURSE PRACTITIONER

## 2024-11-12 PROCEDURE — 3074F SYST BP LT 130 MM HG: CPT | Performed by: NURSE PRACTITIONER

## 2024-11-12 PROCEDURE — 87651 STREP A DNA AMP PROBE: CPT | Performed by: NURSE PRACTITIONER

## 2024-11-12 PROCEDURE — 3078F DIAST BP <80 MM HG: CPT | Performed by: NURSE PRACTITIONER

## 2024-11-12 PROCEDURE — 1125F AMNT PAIN NOTED PAIN PRSNT: CPT | Performed by: NURSE PRACTITIONER

## 2024-11-12 RX ORDER — AMOXICILLIN 500 MG/1
500 CAPSULE ORAL 2 TIMES DAILY
Qty: 20 CAPSULE | Refills: 0 | Status: SHIPPED | OUTPATIENT
Start: 2024-11-12 | End: 2024-11-22

## 2024-11-12 RX ORDER — LIDOCAINE HYDROCHLORIDE 20 MG/ML
15 SOLUTION OROPHARYNGEAL
Qty: 100 ML | Refills: 0 | Status: SHIPPED | OUTPATIENT
Start: 2024-11-12

## 2024-11-12 ASSESSMENT — ENCOUNTER SYMPTOMS
COUGH: 1
SHORTNESS OF BREATH: 0
SORE THROAT: 1
CHILLS: 1

## 2024-11-12 ASSESSMENT — VISUAL ACUITY: OU: 1

## 2024-11-12 ASSESSMENT — FIBROSIS 4 INDEX: FIB4 SCORE: 0.31

## 2024-11-12 ASSESSMENT — PAIN SCALES - GENERAL: PAINLEVEL_OUTOF10: 6=MODERATE PAIN

## 2024-11-12 NOTE — PROGRESS NOTES
Subjective:     Sherrell Toro is a 34 y.o. female who presents for Pharyngitis (X 1 day w/ chills ) and Otalgia (Right ear x1day)       Pharyngitis   This is a new problem. The problem has been gradually worsening. Associated symptoms include coughing (Mild) and ear pain (Left). Pertinent negatives include no shortness of breath.   Otalgia   Associated symptoms include coughing (Mild) and a sore throat.     History of Present Illness  The patient is a 34-year-old female who presents for evaluation of sore throat.    She reports experiencing symptoms similar to strep throat, which she had earlier this year. Her symptoms began last week and initially subsided after taking Theraflu and Flanax, a medication provided by her aunt. However, the symptoms reappeared unexpectedly last night. She also mentions pain in her left ear, chills, and a mild cough. Swallowing is reported to be painful.    [employer] Copilot used during this encounter with the consent of the patient.     Review of Systems   Constitutional:  Positive for chills.   HENT:  Positive for ear pain (Left) and sore throat.    Respiratory:  Positive for cough (Mild). Negative for shortness of breath.    All other systems reviewed and are negative.    Refer to HPI for additional details.    During this visit, appropriate PPE was worn, and hand hygiene was performed.    PMH:  has a past medical history of Depression.    MEDS:   Current Outpatient Medications:     lidocaine (XYLOCAINE) 2 % Solution, Take 15 mL by mouth every 3 hours as needed for Throat/Mouth Pain. Swish/gargle well, then spit out, Disp: 100 mL, Rfl: 0    amoxicillin (AMOXIL) 500 MG Cap, Take 1 Capsule by mouth 2 times a day for 10 days., Disp: 20 Capsule, Rfl: 0    ALLERGIES: No Known Allergies  SURGHX:   Past Surgical History:   Procedure Laterality Date    ABDOMINAL HYSTERECTOMY TOTAL      2023 at [facility]; Dr. Conchita Rice; still has both ovaries    MYOMECTOMY      OTHER Right     myoectomy  "with Rt. cyst removed    OVARIAN CYSTECTOMY Right     PRIMARY C SECTION      x2     SOCHX:  reports that she has never smoked. She has never used smokeless tobacco. She reports that she does not drink alcohol and does not use drugs.    FH: Per HPI as applicable/pertinent.      Objective:     /66 (BP Location: Right arm, Patient Position: Sitting, BP Cuff Size: Adult)   Pulse (!) 116   Temp 36.7 °C (98.1 °F) (Temporal)   Resp 16   Ht 1.549 m (5' 1\")   Wt 95 kg (209 lb 6.4 oz)   LMP 09/15/2023 (Exact Date)   SpO2 95%   BMI 39.57 kg/m²     Physical Exam  Nursing note reviewed.   Constitutional:       General: She is not in acute distress.     Appearance: She is well-developed. She is not ill-appearing or toxic-appearing.   HENT:      Right Ear: Tympanic membrane normal.      Left Ear: Tympanic membrane normal.      Nose: Nose normal.      Mouth/Throat:      Mouth: Mucous membranes are moist.      Pharynx: Pharyngeal swelling, oropharyngeal exudate and posterior oropharyngeal erythema present.      Tonsils: 3+ on the right. 3+ on the left.   Eyes:      General: Vision grossly intact.   Neck:      Trachea: Phonation normal.   Cardiovascular:      Rate and Rhythm: Tachycardia present.   Pulmonary:      Effort: Pulmonary effort is normal. No respiratory distress.   Musculoskeletal:         General: No deformity. Normal range of motion.   Skin:     General: Skin is warm and dry.      Coloration: Skin is not pale.   Neurological:      Mental Status: She is alert and oriented to person, place, and time.      Motor: No weakness.   Psychiatric:         Behavior: Behavior normal. Behavior is cooperative.     POCT Cepheid Group A Strep by PCR: positive      Assessment/Plan:     1. Pharyngitis due to Streptococcus species  - POCT CEPHEID GROUP A STREP - PCR  - lidocaine (XYLOCAINE) 2 % Solution; Take 15 mL by mouth every 3 hours as needed for Throat/Mouth Pain. Swish/gargle well, then spit out  Dispense: 100 mL; " Refill: 0  - amoxicillin (AMOXIL) 500 MG Cap; Take 1 Capsule by mouth 2 times a day for 10 days.  Dispense: 20 Capsule; Refill: 0    Rx as above sent electronically.    Advised strep is most contagious until on antibiotics for at least 24 hours. Avoid close oral contact. Avoid sharing drinks. Change toothbrush 2 days after starting antibiotic. Perform frequent hand hygiene.    Emphasize supportive measures, rest, fluids, gargles, and symptom management with over-the-counter medication as needed such as ibuprofen and APAP.    Monitor. Warning signs reviewed. Return precautions advised.     Differential diagnosis, natural history, supportive care, over-the-counter symptom management per 's instructions, close monitoring, and indications for immediate follow-up discussed.     All questions answered. Patient agrees with the plan of care.    Discharge summary provided via [employer].

## 2024-11-20 ENCOUNTER — HOSPITAL ENCOUNTER (OUTPATIENT)
Dept: LAB | Facility: MEDICAL CENTER | Age: 34
End: 2024-11-20
Attending: PHYSICIAN ASSISTANT
Payer: COMMERCIAL

## 2024-11-20 DIAGNOSIS — Z11.59 NEED FOR HEPATITIS C SCREENING TEST: ICD-10-CM

## 2024-11-20 DIAGNOSIS — R73.03 PREDIABETES: ICD-10-CM

## 2024-11-20 DIAGNOSIS — Z11.3 SCREENING EXAMINATION FOR SEXUALLY TRANSMITTED DISEASE: ICD-10-CM

## 2024-11-20 DIAGNOSIS — E66.3 OVERWEIGHT: ICD-10-CM

## 2024-11-20 LAB
ALBUMIN SERPL BCP-MCNC: 4 G/DL (ref 3.2–4.9)
ALBUMIN/GLOB SERPL: 1 G/DL
ALP SERPL-CCNC: 113 U/L (ref 30–99)
ALT SERPL-CCNC: 18 U/L (ref 2–50)
ANION GAP SERPL CALC-SCNC: 10 MMOL/L (ref 7–16)
AST SERPL-CCNC: 24 U/L (ref 12–45)
BASOPHILS # BLD AUTO: 0.5 % (ref 0–1.8)
BASOPHILS # BLD: 0.03 K/UL (ref 0–0.12)
BILIRUB SERPL-MCNC: 0.3 MG/DL (ref 0.1–1.5)
BUN SERPL-MCNC: 14 MG/DL (ref 8–22)
CALCIUM ALBUM COR SERPL-MCNC: 9.4 MG/DL (ref 8.5–10.5)
CALCIUM SERPL-MCNC: 9.4 MG/DL (ref 8.5–10.5)
CHLORIDE SERPL-SCNC: 103 MMOL/L (ref 96–112)
CHOLEST SERPL-MCNC: 149 MG/DL (ref 100–199)
CO2 SERPL-SCNC: 25 MMOL/L (ref 20–33)
CREAT SERPL-MCNC: 0.67 MG/DL (ref 0.5–1.4)
EOSINOPHIL # BLD AUTO: 0.19 K/UL (ref 0–0.51)
EOSINOPHIL NFR BLD: 3 % (ref 0–6.9)
ERYTHROCYTE [DISTWIDTH] IN BLOOD BY AUTOMATED COUNT: 43.1 FL (ref 35.9–50)
EST. AVERAGE GLUCOSE BLD GHB EST-MCNC: 111 MG/DL
GFR SERPLBLD CREATININE-BSD FMLA CKD-EPI: 118 ML/MIN/1.73 M 2
GLOBULIN SER CALC-MCNC: 4 G/DL (ref 1.9–3.5)
GLUCOSE SERPL-MCNC: 93 MG/DL (ref 65–99)
HBA1C MFR BLD: 5.5 % (ref 4–5.6)
HCT VFR BLD AUTO: 45.3 % (ref 37–47)
HCV AB SER QL: NONREACTIVE
HDLC SERPL-MCNC: 28 MG/DL
HGB BLD-MCNC: 14.6 G/DL (ref 12–16)
HIV 1+2 AB+HIV1 P24 AG SERPL QL IA: NORMAL
IMM GRANULOCYTES # BLD AUTO: 0.01 K/UL (ref 0–0.11)
IMM GRANULOCYTES NFR BLD AUTO: 0.2 % (ref 0–0.9)
LDLC SERPL CALC-MCNC: 97 MG/DL
LYMPHOCYTES # BLD AUTO: 2.23 K/UL (ref 1–4.8)
LYMPHOCYTES NFR BLD: 34.6 % (ref 22–41)
MCH RBC QN AUTO: 27.1 PG (ref 27–33)
MCHC RBC AUTO-ENTMCNC: 32.2 G/DL (ref 32.2–35.5)
MCV RBC AUTO: 84.2 FL (ref 81.4–97.8)
MONOCYTES # BLD AUTO: 0.34 K/UL (ref 0–0.85)
MONOCYTES NFR BLD AUTO: 5.3 % (ref 0–13.4)
NEUTROPHILS # BLD AUTO: 3.64 K/UL (ref 1.82–7.42)
NEUTROPHILS NFR BLD: 56.4 % (ref 44–72)
NRBC # BLD AUTO: 0 K/UL
NRBC BLD-RTO: 0 /100 WBC (ref 0–0.2)
PLATELET # BLD AUTO: 460 K/UL (ref 164–446)
PMV BLD AUTO: 10.4 FL (ref 9–12.9)
POTASSIUM SERPL-SCNC: 4.1 MMOL/L (ref 3.6–5.5)
PROT SERPL-MCNC: 8 G/DL (ref 6–8.2)
RBC # BLD AUTO: 5.38 M/UL (ref 4.2–5.4)
SODIUM SERPL-SCNC: 138 MMOL/L (ref 135–145)
TRIGL SERPL-MCNC: 120 MG/DL (ref 0–149)
TSH SERPL DL<=0.005 MIU/L-ACNC: 0.98 UIU/ML (ref 0.38–5.33)
WBC # BLD AUTO: 6.4 K/UL (ref 4.8–10.8)

## 2024-11-20 PROCEDURE — 85025 COMPLETE CBC W/AUTO DIFF WBC: CPT

## 2024-11-20 PROCEDURE — 36415 COLL VENOUS BLD VENIPUNCTURE: CPT

## 2024-11-20 PROCEDURE — 86803 HEPATITIS C AB TEST: CPT

## 2024-11-20 PROCEDURE — 83036 HEMOGLOBIN GLYCOSYLATED A1C: CPT

## 2024-11-20 PROCEDURE — 84443 ASSAY THYROID STIM HORMONE: CPT

## 2024-11-20 PROCEDURE — 87389 HIV-1 AG W/HIV-1&-2 AB AG IA: CPT

## 2024-11-20 PROCEDURE — 80053 COMPREHEN METABOLIC PANEL: CPT

## 2024-11-20 PROCEDURE — 80061 LIPID PANEL: CPT

## 2024-11-21 DIAGNOSIS — R79.89 ELEVATED PLATELET COUNT: ICD-10-CM

## 2024-11-21 DIAGNOSIS — R77.1 ELEVATED SERUM GLOBULIN LEVEL: ICD-10-CM

## 2025-01-29 ENCOUNTER — APPOINTMENT (OUTPATIENT)
Dept: MEDICAL GROUP | Facility: PHYSICIAN GROUP | Age: 35
End: 2025-01-29
Payer: COMMERCIAL

## 2025-02-03 ENCOUNTER — APPOINTMENT (OUTPATIENT)
Dept: MEDICAL GROUP | Facility: PHYSICIAN GROUP | Age: 35
End: 2025-02-03
Payer: COMMERCIAL

## 2025-02-03 ENCOUNTER — HOSPITAL ENCOUNTER (OUTPATIENT)
Dept: LAB | Facility: MEDICAL CENTER | Age: 35
End: 2025-02-03
Attending: PHYSICIAN ASSISTANT
Payer: COMMERCIAL

## 2025-02-03 VITALS
TEMPERATURE: 98.9 F | SYSTOLIC BLOOD PRESSURE: 114 MMHG | DIASTOLIC BLOOD PRESSURE: 74 MMHG | RESPIRATION RATE: 16 BRPM | HEART RATE: 76 BPM | WEIGHT: 201.2 LBS | BODY MASS INDEX: 37.99 KG/M2 | HEIGHT: 61 IN | OXYGEN SATURATION: 97 %

## 2025-02-03 DIAGNOSIS — R51.9 NONINTRACTABLE HEADACHE, UNSPECIFIED CHRONICITY PATTERN, UNSPECIFIED HEADACHE TYPE: ICD-10-CM

## 2025-02-03 DIAGNOSIS — N39.41 URGE INCONTINENCE OF URINE: ICD-10-CM

## 2025-02-03 DIAGNOSIS — R77.1 ELEVATED SERUM GLOBULIN LEVEL: ICD-10-CM

## 2025-02-03 DIAGNOSIS — R73.03 PREDIABETES: ICD-10-CM

## 2025-02-03 DIAGNOSIS — E66.3 OVERWEIGHT: ICD-10-CM

## 2025-02-03 DIAGNOSIS — L30.9 ECZEMA, UNSPECIFIED TYPE: ICD-10-CM

## 2025-02-03 DIAGNOSIS — R79.89 ELEVATED PLATELET COUNT: ICD-10-CM

## 2025-02-03 DIAGNOSIS — J02.0 RECURRENT STREPTOCOCCAL PHARYNGITIS: ICD-10-CM

## 2025-02-03 LAB
ALBUMIN SERPL BCP-MCNC: 4.1 G/DL (ref 3.2–4.9)
ALBUMIN/GLOB SERPL: 1.1 G/DL
ALP SERPL-CCNC: 100 U/L (ref 30–99)
ALT SERPL-CCNC: 20 U/L (ref 2–50)
ANION GAP SERPL CALC-SCNC: 12 MMOL/L (ref 7–16)
AST SERPL-CCNC: 21 U/L (ref 12–45)
BASOPHILS # BLD AUTO: 0.4 % (ref 0–1.8)
BASOPHILS # BLD: 0.03 K/UL (ref 0–0.12)
BILIRUB SERPL-MCNC: 0.3 MG/DL (ref 0.1–1.5)
BUN SERPL-MCNC: 10 MG/DL (ref 8–22)
CALCIUM ALBUM COR SERPL-MCNC: 8.9 MG/DL (ref 8.5–10.5)
CALCIUM SERPL-MCNC: 9 MG/DL (ref 8.5–10.5)
CHLORIDE SERPL-SCNC: 105 MMOL/L (ref 96–112)
CO2 SERPL-SCNC: 24 MMOL/L (ref 20–33)
CREAT SERPL-MCNC: 0.66 MG/DL (ref 0.5–1.4)
EOSINOPHIL # BLD AUTO: 0.16 K/UL (ref 0–0.51)
EOSINOPHIL NFR BLD: 2.2 % (ref 0–6.9)
ERYTHROCYTE [DISTWIDTH] IN BLOOD BY AUTOMATED COUNT: 44.9 FL (ref 35.9–50)
GFR SERPLBLD CREATININE-BSD FMLA CKD-EPI: 119 ML/MIN/1.73 M 2
GLOBULIN SER CALC-MCNC: 3.7 G/DL (ref 1.9–3.5)
GLUCOSE SERPL-MCNC: 82 MG/DL (ref 65–99)
HCT VFR BLD AUTO: 43.8 % (ref 37–47)
HGB BLD-MCNC: 14 G/DL (ref 12–16)
IMM GRANULOCYTES # BLD AUTO: 0.01 K/UL (ref 0–0.11)
IMM GRANULOCYTES NFR BLD AUTO: 0.1 % (ref 0–0.9)
LYMPHOCYTES # BLD AUTO: 2.2 K/UL (ref 1–4.8)
LYMPHOCYTES NFR BLD: 30.6 % (ref 22–41)
MCH RBC QN AUTO: 27.2 PG (ref 27–33)
MCHC RBC AUTO-ENTMCNC: 32 G/DL (ref 32.2–35.5)
MCV RBC AUTO: 85.2 FL (ref 81.4–97.8)
MONOCYTES # BLD AUTO: 0.38 K/UL (ref 0–0.85)
MONOCYTES NFR BLD AUTO: 5.3 % (ref 0–13.4)
NEUTROPHILS # BLD AUTO: 4.42 K/UL (ref 1.82–7.42)
NEUTROPHILS NFR BLD: 61.4 % (ref 44–72)
NRBC # BLD AUTO: 0 K/UL
NRBC BLD-RTO: 0 /100 WBC (ref 0–0.2)
PLATELET # BLD AUTO: 411 K/UL (ref 164–446)
PMV BLD AUTO: 10.3 FL (ref 9–12.9)
POTASSIUM SERPL-SCNC: 3.9 MMOL/L (ref 3.6–5.5)
PROT SERPL-MCNC: 7.8 G/DL (ref 6–8.2)
RBC # BLD AUTO: 5.14 M/UL (ref 4.2–5.4)
SODIUM SERPL-SCNC: 141 MMOL/L (ref 135–145)
WBC # BLD AUTO: 7.2 K/UL (ref 4.8–10.8)

## 2025-02-03 PROCEDURE — 85025 COMPLETE CBC W/AUTO DIFF WBC: CPT

## 2025-02-03 PROCEDURE — 99214 OFFICE O/P EST MOD 30 MIN: CPT | Performed by: PHYSICIAN ASSISTANT

## 2025-02-03 PROCEDURE — 82784 ASSAY IGA/IGD/IGG/IGM EACH: CPT

## 2025-02-03 PROCEDURE — 80053 COMPREHEN METABOLIC PANEL: CPT

## 2025-02-03 PROCEDURE — 83525 ASSAY OF INSULIN: CPT

## 2025-02-03 PROCEDURE — 84155 ASSAY OF PROTEIN SERUM: CPT

## 2025-02-03 PROCEDURE — 84165 PROTEIN E-PHORESIS SERUM: CPT

## 2025-02-03 PROCEDURE — 36415 COLL VENOUS BLD VENIPUNCTURE: CPT

## 2025-02-03 PROCEDURE — 83521 IG LIGHT CHAINS FREE EACH: CPT

## 2025-02-03 PROCEDURE — 3078F DIAST BP <80 MM HG: CPT | Performed by: PHYSICIAN ASSISTANT

## 2025-02-03 PROCEDURE — 3074F SYST BP LT 130 MM HG: CPT | Performed by: PHYSICIAN ASSISTANT

## 2025-02-03 PROCEDURE — 86334 IMMUNOFIX E-PHORESIS SERUM: CPT

## 2025-02-03 RX ORDER — PHENTERMINE HYDROCHLORIDE 37.5 MG/1
37.5 CAPSULE ORAL EVERY MORNING
Qty: 30 CAPSULE | Refills: 1 | Status: SHIPPED | OUTPATIENT
Start: 2025-02-03 | End: 2025-03-05

## 2025-02-03 RX ORDER — TRIAMCINOLONE ACETONIDE 1 MG/G
CREAM TOPICAL
Qty: 15 G | Refills: 5 | Status: SHIPPED | OUTPATIENT
Start: 2025-02-03

## 2025-02-03 ASSESSMENT — ENCOUNTER SYMPTOMS
SHORTNESS OF BREATH: 0
CHILLS: 0
FEVER: 0

## 2025-02-03 ASSESSMENT — PATIENT HEALTH QUESTIONNAIRE - PHQ9: CLINICAL INTERPRETATION OF PHQ2 SCORE: 0

## 2025-02-03 ASSESSMENT — FIBROSIS 4 INDEX: FIB4 SCORE: 0.41

## 2025-02-03 NOTE — ASSESSMENT & PLAN NOTE
Chronic, intermittent.  Reports strep throat 4 times 8/2023    Declines referral to ENT.  Will let me know if she wants to see ENT.

## 2025-02-03 NOTE — PROGRESS NOTES
SUBJECTIVE:     CC: Lab follow-up    HPI:   Sherrell presents today with the following:    ASSESSMENT & PLAN by Problem:     Problem   Headache    Acute, improving.  Follow-up for worsening symptoms.  Otherwise will reevaluate in 6-8 weeks at follow-up.     Recurrent Streptococcal Pharyngitis    Chronic, intermittent.  2 positive strep's in 2023 and 2 positive strep's in 2024.  Declines ENT referral.  Follow-up for worsening symptoms     Eczema    Chronic, intermittent.  Refilling triamcinolone to be used as needed.     Urge Incontinence of Urine    Urinary urgency  Leakage when trying hold it  Advised follow up with Dr. Rice as she already sees her.     Prediabetes    Chronic, stable.  A1c 5.5 (11/2024); 5.6 around 2021.     Overweight    Chronic, uncontrolled.  Improving.  Trying to make some food changes.  Would like to start phentermine.  Discussed short term use.       Tdap: declines    Labs to be drawn:      Return for phentermine f/u in 6-8 weeks; otherwise follow up in one year.        Healthcare Maintenance:      HPI:     Problem List Items Addressed This Visit       Eczema     Chronic, intermittent.  Eczema on her right eye  Years  Was given triamcinolone 0.1% cream which seems to be helping  Needs a refill         Relevant Medications    triamcinolone acetonide (KENALOG) 0.1 % Cream    Headache     Acute, improving.  HA over the weekend  Left side of head  Feeling better today  History of the same, same location  Last HA was 1-2 years ago  Denies vision changes, nausea, falls         Overweight     Chronic, uncontrolled.  Since delivery her child 3/2023 has had a hard time losing weight.  Currently eating higher protein, increase water intake.  Eating more at home.  Eating more fruits/veg.  Meal prepping.  Activity: tries to go for walks but has been having some family health issues affecting this.  History of phentermine use.  Would like to go back on this.    1/2025: 201#  11/2024: 209#         Relevant  "Medications    phentermine 37.5 MG capsule    Other Relevant Orders    INSULIN FASTING    Prediabetes    Relevant Medications    phentermine 37.5 MG capsule    Other Relevant Orders    INSULIN FASTING    Recurrent streptococcal pharyngitis     Chronic, intermittent.  Reports strep throat 4 times 8/2023    Declines referral to ENT.  Will let me know if she wants to see ENT.         Urge incontinence of urine     Urinary urgency  Leakage when trying hold it  Advised follow up with Dr. Rice as she already sees her.  Made the appointment but had to cancel due to family issues.  Patient states she will schedule.                   ROS:  Review of Systems   Constitutional:  Negative for chills and fever.   Respiratory:  Negative for shortness of breath.    Cardiovascular:  Negative for chest pain.       OBJECTIVE:     Exam:  /74 (BP Location: Left arm, Patient Position: Sitting, BP Cuff Size: Adult)   Pulse 76   Temp 37.2 °C (98.9 °F) (Temporal)   Resp 16   Ht 1.549 m (5' 1\")   Wt 91.3 kg (201 lb 3.2 oz)   LMP 09/15/2023 (Exact Date)   SpO2 97%   BMI 38.02 kg/m²  Body mass index is 38.02 kg/m².    Physical Exam  Vitals reviewed.   Constitutional:       General: She is not in acute distress.     Appearance: Normal appearance.   Pulmonary:      Effort: Pulmonary effort is normal.   Neurological:      General: No focal deficit present.      Mental Status: She is alert.   Psychiatric:         Mood and Affect: Mood normal.         Behavior: Behavior normal.         Judgment: Judgment normal.           CHART REVIEW:     Labs:                Please note that this dictation was created using voice recognition software. I have made every reasonable attempt to correct obvious errors, but I expect that there are errors of grammar and possibly content that I did not discover before finalizing the note.    "

## 2025-02-03 NOTE — ASSESSMENT & PLAN NOTE
Urinary urgency  Leakage when trying hold it  Advised follow up with Dr. Rice as she already sees her.  Made the appointment but had to cancel due to family issues.  Patient states she will schedule.

## 2025-02-03 NOTE — ASSESSMENT & PLAN NOTE
Acute, improving.  HA over the weekend  Left side of head  Feeling better today  History of the same, same location  Last HA was 1-2 years ago  Denies vision changes, nausea, falls

## 2025-02-03 NOTE — ASSESSMENT & PLAN NOTE
Chronic, uncontrolled.  Since delivery her child 3/2023 has had a hard time losing weight.  Currently eating higher protein, increase water intake.  Eating more at home.  Eating more fruits/veg.  Meal prepping.  Activity: tries to go for walks but has been having some family health issues affecting this.  History of phentermine use.  Would like to go back on this.    1/2025: 201#  11/2024: 209#

## 2025-02-03 NOTE — ASSESSMENT & PLAN NOTE
Chronic, intermittent.  Eczema on her right eye  Years  Was given triamcinolone 0.1% cream which seems to be helping  Needs a refill

## 2025-02-05 LAB — INSULIN P FAST SERPL-ACNC: 12 UIU/ML (ref 3–25)

## 2025-02-06 LAB
ALBUMIN SERPL ELPH-MCNC: 4.1 G/DL (ref 3.75–5.01)
ALPHA1 GLOB SERPL ELPH-MCNC: 0.32 G/DL (ref 0.19–0.46)
ALPHA2 GLOB SERPL ELPH-MCNC: 0.77 G/DL (ref 0.48–1.05)
B-GLOBULIN SERPL ELPH-MCNC: 0.9 G/DL (ref 0.48–1.1)
EER MONOCLONAL PROTEIN AND FLC, SERUM Q5224: ABNORMAL
GAMMA GLOB SERPL ELPH-MCNC: 1.41 G/DL (ref 0.62–1.51)
IGA SERPL-MCNC: 268 MG/DL (ref 68–408)
IGG SERPL-MCNC: 1396 MG/DL (ref 768–1632)
IGM SERPL-MCNC: 276 MG/DL (ref 35–263)
INTERPRETATION SERPL IFE-IMP: ABNORMAL
INTERPRETATION SERPL IFE-IMP: ABNORMAL
KAPPA LC FREE SER-MCNC: 24.38 MG/L (ref 3.3–19.4)
KAPPA LC FREE/LAMBDA FREE SER NEPH: 1.2 {RATIO} (ref 0.26–1.65)
LAMBDA LC FREE SERPL-MCNC: 20.34 MG/L (ref 5.71–26.3)
MONOCLONAL PROTEIN NL11656: ABNORMAL G/DL
PROT SERPL-MCNC: 7.5 G/DL (ref 6.3–8.2)

## 2025-04-01 ENCOUNTER — APPOINTMENT (OUTPATIENT)
Dept: MEDICAL GROUP | Facility: PHYSICIAN GROUP | Age: 35
End: 2025-04-01
Payer: COMMERCIAL

## 2025-04-01 VITALS
HEART RATE: 86 BPM | TEMPERATURE: 97.9 F | WEIGHT: 194.4 LBS | SYSTOLIC BLOOD PRESSURE: 108 MMHG | OXYGEN SATURATION: 97 % | HEIGHT: 61 IN | RESPIRATION RATE: 9 BRPM | BODY MASS INDEX: 36.7 KG/M2 | DIASTOLIC BLOOD PRESSURE: 74 MMHG

## 2025-04-01 DIAGNOSIS — E66.3 OVERWEIGHT: ICD-10-CM

## 2025-04-01 PROCEDURE — 99214 OFFICE O/P EST MOD 30 MIN: CPT | Performed by: PHYSICIAN ASSISTANT

## 2025-04-01 PROCEDURE — 3074F SYST BP LT 130 MM HG: CPT | Performed by: PHYSICIAN ASSISTANT

## 2025-04-01 PROCEDURE — 3078F DIAST BP <80 MM HG: CPT | Performed by: PHYSICIAN ASSISTANT

## 2025-04-01 RX ORDER — PHENTERMINE HYDROCHLORIDE 37.5 MG/1
37.5 TABLET ORAL
COMMUNITY
End: 2025-04-01 | Stop reason: SDUPTHER

## 2025-04-01 RX ORDER — PHENTERMINE HYDROCHLORIDE 37.5 MG/1
37.5 TABLET ORAL
Qty: 30 TABLET | Refills: 2 | Status: SHIPPED | OUTPATIENT
Start: 2025-04-01 | End: 2025-06-30

## 2025-04-01 ASSESSMENT — ENCOUNTER SYMPTOMS
CHILLS: 0
SHORTNESS OF BREATH: 0
FEVER: 0

## 2025-04-01 ASSESSMENT — FIBROSIS 4 INDEX: FIB4 SCORE: 0.38

## 2025-04-01 NOTE — PROGRESS NOTES
"SUBJECTIVE:     CC: Phentermine follow-up    HPI:   Sherrell presents today with the following:    ASSESSMENT & PLAN by Problem:     Problem   Overweight    Chronic, uncontrolled.  Improving.  Trying to make some food changes.  Tolerate/continue phentermine 37.5 mg daily.           Return in about 2 months (around 6/1/2025) for phentermine.    Follow-up instructions from 2/3/2025 primary care visit:  Follow-up February 2026, sooner as needed (phentermine).        HPI:     Problem List Items Addressed This Visit       Overweight    Chronic, uncontrolled.  Since delivery her child 3/2023 has had a hard time losing weight.  Currently eating higher protein, increase water intake.  Eating more at home.  Eating more fruits/veg.  Meal prepping.  Activity: tries to go for walks but has been having some family health issues affecting this.  Phentermine 37.5 mg: Not taking it daily.  Denies any side effects to the medication, specifically palpitations and sleep issues.    4/2025: 194#  2/2025: 201#  11/2024: 209#         Relevant Medications    phentermine (ADIPEX-P) 37.5 MG tablet              ROS:  Review of Systems   Constitutional:  Negative for chills and fever.   Respiratory:  Negative for shortness of breath.    Cardiovascular:  Negative for chest pain.       OBJECTIVE:     Exam:  /74   Pulse 86   Temp 36.6 °C (97.9 °F) (Temporal)   Resp (!) 9   Ht 1.549 m (5' 1\")   Wt 88.2 kg (194 lb 6.4 oz)   LMP 09/15/2023 (Exact Date)   SpO2 97%   BMI 36.73 kg/m²  Body mass index is 36.73 kg/m².    Physical Exam  Vitals reviewed.   Constitutional:       General: She is not in acute distress.     Appearance: Normal appearance.   Pulmonary:      Effort: Pulmonary effort is normal.   Neurological:      General: No focal deficit present.      Mental Status: She is alert.   Psychiatric:         Mood and Affect: Mood normal.         Behavior: Behavior normal.         Judgment: Judgment normal.             Please note that this " dictation was created using voice recognition software. I have made every reasonable attempt to correct obvious errors, but I expect that there are errors of grammar and possibly content that I did not discover before finalizing the note.

## 2025-04-01 NOTE — ASSESSMENT & PLAN NOTE
Chronic, uncontrolled.  Since delivery her child 3/2023 has had a hard time losing weight.  Currently eating higher protein, increase water intake.  Eating more at home.  Eating more fruits/veg.  Meal prepping.  Activity: tries to go for walks but has been having some family health issues affecting this.  Phentermine 37.5 mg: Not taking it daily.  Denies any side effects to the medication, specifically palpitations and sleep issues.    4/2025: 194#  2/2025: 201#  11/2024: 209#

## 2025-05-08 ENCOUNTER — ANESTHESIA EVENT (OUTPATIENT)
Dept: SURGERY | Facility: MEDICAL CENTER | Age: 35
End: 2025-05-08
Payer: COMMERCIAL

## 2025-05-08 ENCOUNTER — HOSPITAL ENCOUNTER (OUTPATIENT)
Facility: MEDICAL CENTER | Age: 35
End: 2025-05-09
Attending: STUDENT IN AN ORGANIZED HEALTH CARE EDUCATION/TRAINING PROGRAM | Admitting: SURGERY
Payer: COMMERCIAL

## 2025-05-08 ENCOUNTER — APPOINTMENT (OUTPATIENT)
Dept: RADIOLOGY | Facility: MEDICAL CENTER | Age: 35
End: 2025-05-08
Attending: STUDENT IN AN ORGANIZED HEALTH CARE EDUCATION/TRAINING PROGRAM
Payer: COMMERCIAL

## 2025-05-08 ENCOUNTER — ANESTHESIA (OUTPATIENT)
Dept: SURGERY | Facility: MEDICAL CENTER | Age: 35
End: 2025-05-08
Payer: COMMERCIAL

## 2025-05-08 DIAGNOSIS — R10.11 RUQ PAIN: ICD-10-CM

## 2025-05-08 DIAGNOSIS — Z90.49 S/P LAPAROSCOPIC CHOLECYSTECTOMY: ICD-10-CM

## 2025-05-08 DIAGNOSIS — R11.2 NAUSEA AND VOMITING, UNSPECIFIED VOMITING TYPE: ICD-10-CM

## 2025-05-08 DIAGNOSIS — K81.0 ACUTE CHOLECYSTITIS: ICD-10-CM

## 2025-05-08 LAB
ALBUMIN SERPL BCP-MCNC: 4.3 G/DL (ref 3.2–4.9)
ALBUMIN/GLOB SERPL: 1.3 G/DL
ALP SERPL-CCNC: 85 U/L (ref 30–99)
ALT SERPL-CCNC: 28 U/L (ref 2–50)
ANION GAP SERPL CALC-SCNC: 9 MMOL/L (ref 7–16)
APPEARANCE UR: CLEAR
AST SERPL-CCNC: 25 U/L (ref 12–45)
BASOPHILS # BLD AUTO: 0.3 % (ref 0–1.8)
BASOPHILS # BLD: 0.03 K/UL (ref 0–0.12)
BILIRUB SERPL-MCNC: 0.3 MG/DL (ref 0.1–1.5)
BILIRUB UR QL STRIP.AUTO: NEGATIVE
BUN SERPL-MCNC: 15 MG/DL (ref 8–22)
CALCIUM ALBUM COR SERPL-MCNC: 9.5 MG/DL (ref 8.5–10.5)
CALCIUM SERPL-MCNC: 9.7 MG/DL (ref 8.5–10.5)
CHLORIDE SERPL-SCNC: 104 MMOL/L (ref 96–112)
CO2 SERPL-SCNC: 25 MMOL/L (ref 20–33)
COLOR UR: YELLOW
CREAT SERPL-MCNC: 0.65 MG/DL (ref 0.5–1.4)
EKG IMPRESSION: NORMAL
EOSINOPHIL # BLD AUTO: 0.19 K/UL (ref 0–0.51)
EOSINOPHIL NFR BLD: 1.8 % (ref 0–6.9)
ERYTHROCYTE [DISTWIDTH] IN BLOOD BY AUTOMATED COUNT: 45 FL (ref 35.9–50)
GFR SERPLBLD CREATININE-BSD FMLA CKD-EPI: 119 ML/MIN/1.73 M 2
GLOBULIN SER CALC-MCNC: 3.4 G/DL (ref 1.9–3.5)
GLUCOSE SERPL-MCNC: 124 MG/DL (ref 65–99)
GLUCOSE UR STRIP.AUTO-MCNC: NEGATIVE MG/DL
HCT VFR BLD AUTO: 43.9 % (ref 37–47)
HGB BLD-MCNC: 14.2 G/DL (ref 12–16)
IMM GRANULOCYTES # BLD AUTO: 0.03 K/UL (ref 0–0.11)
IMM GRANULOCYTES NFR BLD AUTO: 0.3 % (ref 0–0.9)
KETONES UR STRIP.AUTO-MCNC: NEGATIVE MG/DL
LEUKOCYTE ESTERASE UR QL STRIP.AUTO: NEGATIVE
LIPASE SERPL-CCNC: 24 U/L (ref 11–82)
LYMPHOCYTES # BLD AUTO: 3.79 K/UL (ref 1–4.8)
LYMPHOCYTES NFR BLD: 36.4 % (ref 22–41)
MCH RBC QN AUTO: 27.2 PG (ref 27–33)
MCHC RBC AUTO-ENTMCNC: 32.3 G/DL (ref 32.2–35.5)
MCV RBC AUTO: 84.1 FL (ref 81.4–97.8)
MICRO URNS: NORMAL
MONOCYTES # BLD AUTO: 0.69 K/UL (ref 0–0.85)
MONOCYTES NFR BLD AUTO: 6.6 % (ref 0–13.4)
NEUTROPHILS # BLD AUTO: 5.67 K/UL (ref 1.82–7.42)
NEUTROPHILS NFR BLD: 54.6 % (ref 44–72)
NITRITE UR QL STRIP.AUTO: NEGATIVE
NRBC # BLD AUTO: 0 K/UL
NRBC BLD-RTO: 0 /100 WBC (ref 0–0.2)
PATHOLOGY CONSULT NOTE: NORMAL
PH UR STRIP.AUTO: 7 [PH] (ref 5–8)
PLATELET # BLD AUTO: 403 K/UL (ref 164–446)
PMV BLD AUTO: 10.1 FL (ref 9–12.9)
POTASSIUM SERPL-SCNC: 4.8 MMOL/L (ref 3.6–5.5)
PROT SERPL-MCNC: 7.7 G/DL (ref 6–8.2)
PROT UR QL STRIP: NEGATIVE MG/DL
RBC # BLD AUTO: 5.22 M/UL (ref 4.2–5.4)
RBC UR QL AUTO: NEGATIVE
SODIUM SERPL-SCNC: 138 MMOL/L (ref 135–145)
SP GR UR STRIP.AUTO: 1.01
UROBILINOGEN UR STRIP.AUTO-MCNC: 0.2 EU/DL
WBC # BLD AUTO: 10.4 K/UL (ref 4.8–10.8)

## 2025-05-08 PROCEDURE — 96375 TX/PRO/DX INJ NEW DRUG ADDON: CPT

## 2025-05-08 PROCEDURE — 700101 HCHG RX REV CODE 250: Performed by: ANESTHESIOLOGY

## 2025-05-08 PROCEDURE — 99285 EMERGENCY DEPT VISIT HI MDM: CPT

## 2025-05-08 PROCEDURE — 88304 TISSUE EXAM BY PATHOLOGIST: CPT | Performed by: PATHOLOGY

## 2025-05-08 PROCEDURE — 160029 HCHG SURGERY MINUTES - 1ST 30 MINS LEVEL 4: Performed by: SURGERY

## 2025-05-08 PROCEDURE — 160035 HCHG PACU - 1ST 60 MINS PHASE I: Performed by: SURGERY

## 2025-05-08 PROCEDURE — 700105 HCHG RX REV CODE 258: Performed by: SURGERY

## 2025-05-08 PROCEDURE — 700101 HCHG RX REV CODE 250: Performed by: SURGERY

## 2025-05-08 PROCEDURE — 700111 HCHG RX REV CODE 636 W/ 250 OVERRIDE (IP): Mod: JZ | Performed by: SURGERY

## 2025-05-08 PROCEDURE — 160041 HCHG SURGERY MINUTES - EA ADDL 1 MIN LEVEL 4: Performed by: SURGERY

## 2025-05-08 PROCEDURE — 700111 HCHG RX REV CODE 636 W/ 250 OVERRIDE (IP): Mod: JZ | Performed by: ANESTHESIOLOGY

## 2025-05-08 PROCEDURE — 160009 HCHG ANES TIME/MIN: Performed by: SURGERY

## 2025-05-08 PROCEDURE — 76705 ECHO EXAM OF ABDOMEN: CPT

## 2025-05-08 PROCEDURE — 88304 TISSUE EXAM BY PATHOLOGIST: CPT | Mod: 26 | Performed by: PATHOLOGY

## 2025-05-08 PROCEDURE — 160002 HCHG RECOVERY MINUTES (STAT): Performed by: SURGERY

## 2025-05-08 PROCEDURE — 160015 HCHG STAT PREOP MINUTES: Performed by: SURGERY

## 2025-05-08 PROCEDURE — 160048 HCHG OR STATISTICAL LEVEL 1-5: Performed by: SURGERY

## 2025-05-08 PROCEDURE — 85025 COMPLETE CBC W/AUTO DIFF WBC: CPT

## 2025-05-08 PROCEDURE — 83690 ASSAY OF LIPASE: CPT

## 2025-05-08 PROCEDURE — 81003 URINALYSIS AUTO W/O SCOPE: CPT

## 2025-05-08 PROCEDURE — G0378 HOSPITAL OBSERVATION PER HR: HCPCS

## 2025-05-08 PROCEDURE — 93005 ELECTROCARDIOGRAM TRACING: CPT | Mod: TC

## 2025-05-08 PROCEDURE — A9270 NON-COVERED ITEM OR SERVICE: HCPCS | Performed by: ANESTHESIOLOGY

## 2025-05-08 PROCEDURE — 96374 THER/PROPH/DIAG INJ IV PUSH: CPT

## 2025-05-08 PROCEDURE — 93005 ELECTROCARDIOGRAM TRACING: CPT | Mod: TC | Performed by: STUDENT IN AN ORGANIZED HEALTH CARE EDUCATION/TRAINING PROGRAM

## 2025-05-08 PROCEDURE — 700102 HCHG RX REV CODE 250 W/ 637 OVERRIDE(OP): Performed by: SURGERY

## 2025-05-08 PROCEDURE — 700102 HCHG RX REV CODE 250 W/ 637 OVERRIDE(OP): Performed by: ANESTHESIOLOGY

## 2025-05-08 PROCEDURE — 47562 LAPAROSCOPIC CHOLECYSTECTOMY: CPT | Mod: AS | Performed by: NURSE PRACTITIONER

## 2025-05-08 PROCEDURE — 36415 COLL VENOUS BLD VENIPUNCTURE: CPT

## 2025-05-08 PROCEDURE — 47562 LAPAROSCOPIC CHOLECYSTECTOMY: CPT | Performed by: SURGERY

## 2025-05-08 PROCEDURE — A9270 NON-COVERED ITEM OR SERVICE: HCPCS | Performed by: SURGERY

## 2025-05-08 PROCEDURE — 80053 COMPREHEN METABOLIC PANEL: CPT

## 2025-05-08 RX ORDER — HYDROMORPHONE HYDROCHLORIDE 1 MG/ML
0.4 INJECTION, SOLUTION INTRAMUSCULAR; INTRAVENOUS; SUBCUTANEOUS
Status: DISCONTINUED | OUTPATIENT
Start: 2025-05-08 | End: 2025-05-08 | Stop reason: HOSPADM

## 2025-05-08 RX ORDER — LIDOCAINE HYDROCHLORIDE 20 MG/ML
INJECTION, SOLUTION EPIDURAL; INFILTRATION; INTRACAUDAL; PERINEURAL PRN
Status: DISCONTINUED | OUTPATIENT
Start: 2025-05-08 | End: 2025-05-08 | Stop reason: SURG

## 2025-05-08 RX ORDER — IBUPROFEN 800 MG/1
TABLET, FILM COATED ORAL
Qty: 60 TABLET | Refills: 0 | Status: SHIPPED | OUTPATIENT
Start: 2025-05-08

## 2025-05-08 RX ORDER — HYDROMORPHONE HYDROCHLORIDE 1 MG/ML
0.2 INJECTION, SOLUTION INTRAMUSCULAR; INTRAVENOUS; SUBCUTANEOUS
Status: DISCONTINUED | OUTPATIENT
Start: 2025-05-08 | End: 2025-05-08 | Stop reason: HOSPADM

## 2025-05-08 RX ORDER — HYDROMORPHONE HYDROCHLORIDE 1 MG/ML
0.1 INJECTION, SOLUTION INTRAMUSCULAR; INTRAVENOUS; SUBCUTANEOUS
Status: DISCONTINUED | OUTPATIENT
Start: 2025-05-08 | End: 2025-05-08 | Stop reason: HOSPADM

## 2025-05-08 RX ORDER — ACETAMINOPHEN 500 MG
TABLET ORAL
COMMUNITY
Start: 2025-05-08

## 2025-05-08 RX ORDER — OXYCODONE HCL 5 MG/5 ML
10 SOLUTION, ORAL ORAL
Status: COMPLETED | OUTPATIENT
Start: 2025-05-08 | End: 2025-05-08

## 2025-05-08 RX ORDER — OXYCODONE HCL 5 MG/5 ML
5 SOLUTION, ORAL ORAL
Status: COMPLETED | OUTPATIENT
Start: 2025-05-08 | End: 2025-05-08

## 2025-05-08 RX ORDER — ONDANSETRON 4 MG/1
4 TABLET, ORALLY DISINTEGRATING ORAL EVERY 4 HOURS PRN
Status: DISCONTINUED | OUTPATIENT
Start: 2025-05-08 | End: 2025-05-09 | Stop reason: HOSPADM

## 2025-05-08 RX ORDER — ACETAMINOPHEN 500 MG
1000 TABLET ORAL EVERY 6 HOURS PRN
Status: DISCONTINUED | OUTPATIENT
Start: 2025-05-13 | End: 2025-05-09 | Stop reason: HOSPADM

## 2025-05-08 RX ORDER — DEXAMETHASONE SODIUM PHOSPHATE 4 MG/ML
INJECTION, SOLUTION INTRA-ARTICULAR; INTRALESIONAL; INTRAMUSCULAR; INTRAVENOUS; SOFT TISSUE PRN
Status: DISCONTINUED | OUTPATIENT
Start: 2025-05-08 | End: 2025-05-08 | Stop reason: SURG

## 2025-05-08 RX ORDER — SCOPOLAMINE 1 MG/3D
1 PATCH, EXTENDED RELEASE TRANSDERMAL
Status: DISCONTINUED | OUTPATIENT
Start: 2025-05-08 | End: 2025-05-08 | Stop reason: HOSPADM

## 2025-05-08 RX ORDER — SODIUM CHLORIDE, SODIUM LACTATE, POTASSIUM CHLORIDE, CALCIUM CHLORIDE 600; 310; 30; 20 MG/100ML; MG/100ML; MG/100ML; MG/100ML
INJECTION, SOLUTION INTRAVENOUS CONTINUOUS
Status: DISCONTINUED | OUTPATIENT
Start: 2025-05-08 | End: 2025-05-08 | Stop reason: HOSPADM

## 2025-05-08 RX ORDER — MIDAZOLAM HYDROCHLORIDE 1 MG/ML
INJECTION INTRAMUSCULAR; INTRAVENOUS PRN
Status: DISCONTINUED | OUTPATIENT
Start: 2025-05-08 | End: 2025-05-08 | Stop reason: SURG

## 2025-05-08 RX ORDER — BUPIVACAINE HYDROCHLORIDE AND EPINEPHRINE 5; 5 MG/ML; UG/ML
INJECTION, SOLUTION EPIDURAL; INTRACAUDAL; PERINEURAL
Status: DISCONTINUED | OUTPATIENT
Start: 2025-05-08 | End: 2025-05-08 | Stop reason: HOSPADM

## 2025-05-08 RX ORDER — OXYCODONE HYDROCHLORIDE 10 MG/1
10 TABLET ORAL
Refills: 0 | Status: DISCONTINUED | OUTPATIENT
Start: 2025-05-08 | End: 2025-05-09 | Stop reason: HOSPADM

## 2025-05-08 RX ORDER — LIDOCAINE HYDROCHLORIDE 40 MG/ML
SOLUTION TOPICAL PRN
Status: DISCONTINUED | OUTPATIENT
Start: 2025-05-08 | End: 2025-05-08 | Stop reason: SURG

## 2025-05-08 RX ORDER — CEFAZOLIN SODIUM 1 G/3ML
INJECTION, POWDER, FOR SOLUTION INTRAMUSCULAR; INTRAVENOUS PRN
Status: DISCONTINUED | OUTPATIENT
Start: 2025-05-08 | End: 2025-05-08 | Stop reason: SURG

## 2025-05-08 RX ORDER — ONDANSETRON 2 MG/ML
4 INJECTION INTRAMUSCULAR; INTRAVENOUS
Status: DISCONTINUED | OUTPATIENT
Start: 2025-05-08 | End: 2025-05-08 | Stop reason: HOSPADM

## 2025-05-08 RX ORDER — HYDROMORPHONE HYDROCHLORIDE 1 MG/ML
0.5 INJECTION, SOLUTION INTRAMUSCULAR; INTRAVENOUS; SUBCUTANEOUS
Status: DISCONTINUED | OUTPATIENT
Start: 2025-05-08 | End: 2025-05-09

## 2025-05-08 RX ORDER — PROCHLORPERAZINE EDISYLATE 5 MG/ML
5-10 INJECTION INTRAMUSCULAR; INTRAVENOUS EVERY 4 HOURS PRN
Status: DISCONTINUED | OUTPATIENT
Start: 2025-05-08 | End: 2025-05-09 | Stop reason: HOSPADM

## 2025-05-08 RX ORDER — OXYCODONE HYDROCHLORIDE 5 MG/1
5 TABLET ORAL
Refills: 0 | Status: DISCONTINUED | OUTPATIENT
Start: 2025-05-08 | End: 2025-05-09 | Stop reason: HOSPADM

## 2025-05-08 RX ORDER — MEPERIDINE HYDROCHLORIDE 25 MG/ML
6.25 INJECTION INTRAMUSCULAR; INTRAVENOUS; SUBCUTANEOUS
Status: DISCONTINUED | OUTPATIENT
Start: 2025-05-08 | End: 2025-05-08 | Stop reason: HOSPADM

## 2025-05-08 RX ORDER — ROCURONIUM BROMIDE 10 MG/ML
INJECTION, SOLUTION INTRAVENOUS PRN
Status: DISCONTINUED | OUTPATIENT
Start: 2025-05-08 | End: 2025-05-08 | Stop reason: SURG

## 2025-05-08 RX ORDER — OXYCODONE HYDROCHLORIDE 5 MG/1
5 TABLET ORAL EVERY 6 HOURS PRN
Qty: 12 TABLET | Refills: 0 | Status: SHIPPED | OUTPATIENT
Start: 2025-05-08 | End: 2025-05-11

## 2025-05-08 RX ORDER — ONDANSETRON 2 MG/ML
INJECTION INTRAMUSCULAR; INTRAVENOUS PRN
Status: DISCONTINUED | OUTPATIENT
Start: 2025-05-08 | End: 2025-05-08 | Stop reason: SURG

## 2025-05-08 RX ORDER — ALBUTEROL SULFATE 5 MG/ML
2.5 SOLUTION RESPIRATORY (INHALATION)
Status: DISCONTINUED | OUTPATIENT
Start: 2025-05-08 | End: 2025-05-08 | Stop reason: HOSPADM

## 2025-05-08 RX ORDER — KETOROLAC TROMETHAMINE 15 MG/ML
INJECTION, SOLUTION INTRAMUSCULAR; INTRAVENOUS PRN
Status: DISCONTINUED | OUTPATIENT
Start: 2025-05-08 | End: 2025-05-08 | Stop reason: SURG

## 2025-05-08 RX ORDER — DIPHENHYDRAMINE HYDROCHLORIDE 50 MG/ML
12.5 INJECTION, SOLUTION INTRAMUSCULAR; INTRAVENOUS
Status: DISCONTINUED | OUTPATIENT
Start: 2025-05-08 | End: 2025-05-08 | Stop reason: HOSPADM

## 2025-05-08 RX ORDER — SODIUM CHLORIDE, SODIUM LACTATE, POTASSIUM CHLORIDE, CALCIUM CHLORIDE 600; 310; 30; 20 MG/100ML; MG/100ML; MG/100ML; MG/100ML
INJECTION, SOLUTION INTRAVENOUS CONTINUOUS
Status: DISCONTINUED | OUTPATIENT
Start: 2025-05-08 | End: 2025-05-09

## 2025-05-08 RX ORDER — ACETAMINOPHEN 500 MG
1000 TABLET ORAL EVERY 6 HOURS
Status: DISCONTINUED | OUTPATIENT
Start: 2025-05-08 | End: 2025-05-09 | Stop reason: HOSPADM

## 2025-05-08 RX ORDER — METRONIDAZOLE 500 MG/100ML
INJECTION, SOLUTION INTRAVENOUS PRN
Status: DISCONTINUED | OUTPATIENT
Start: 2025-05-08 | End: 2025-05-08 | Stop reason: SURG

## 2025-05-08 RX ORDER — ONDANSETRON 2 MG/ML
4 INJECTION INTRAMUSCULAR; INTRAVENOUS EVERY 4 HOURS PRN
Status: DISCONTINUED | OUTPATIENT
Start: 2025-05-08 | End: 2025-05-09 | Stop reason: HOSPADM

## 2025-05-08 RX ORDER — HALOPERIDOL 5 MG/ML
1 INJECTION INTRAMUSCULAR
Status: DISCONTINUED | OUTPATIENT
Start: 2025-05-08 | End: 2025-05-08 | Stop reason: HOSPADM

## 2025-05-08 RX ADMIN — ACETAMINOPHEN 1000 MG: 500 TABLET ORAL at 23:22

## 2025-05-08 RX ADMIN — FENTANYL CITRATE 50 MCG: 50 INJECTION, SOLUTION INTRAMUSCULAR; INTRAVENOUS at 13:25

## 2025-05-08 RX ADMIN — ONDANSETRON 4 MG: 2 INJECTION INTRAMUSCULAR; INTRAVENOUS at 13:42

## 2025-05-08 RX ADMIN — ACETAMINOPHEN 1000 MG: 500 TABLET ORAL at 18:30

## 2025-05-08 RX ADMIN — CEFAZOLIN 2 G: 1 INJECTION, POWDER, FOR SOLUTION INTRAMUSCULAR; INTRAVENOUS at 12:48

## 2025-05-08 RX ADMIN — FENTANYL CITRATE 50 MCG: 50 INJECTION, SOLUTION INTRAMUSCULAR; INTRAVENOUS at 13:50

## 2025-05-08 RX ADMIN — ROCURONIUM BROMIDE 10 MG: 10 INJECTION INTRAVENOUS at 13:36

## 2025-05-08 RX ADMIN — KETOROLAC TROMETHAMINE 15 MG: 15 INJECTION, SOLUTION INTRAMUSCULAR; INTRAVENOUS at 13:50

## 2025-05-08 RX ADMIN — SCOPOLAMINE 1 PATCH: 1.5 PATCH, EXTENDED RELEASE TRANSDERMAL at 12:37

## 2025-05-08 RX ADMIN — LIDOCAINE HYDROCHLORIDE 100 MG: 20 INJECTION, SOLUTION EPIDURAL; INFILTRATION; INTRACAUDAL; PERINEURAL at 12:48

## 2025-05-08 RX ADMIN — PROPOFOL 20 MG: 10 INJECTION, EMULSION INTRAVENOUS at 13:59

## 2025-05-08 RX ADMIN — METRONIDAZOLE 500 MG: 5 INJECTION, SOLUTION INTRAVENOUS at 12:50

## 2025-05-08 RX ADMIN — LIDOCAINE HYDROCHLORIDE 4 ML: 40 SOLUTION TOPICAL at 12:50

## 2025-05-08 RX ADMIN — SUGAMMADEX 200 MG: 100 INJECTION, SOLUTION INTRAVENOUS at 13:53

## 2025-05-08 RX ADMIN — DEXAMETHASONE SODIUM PHOSPHATE 8 MG: 4 INJECTION INTRA-ARTICULAR; INTRALESIONAL; INTRAMUSCULAR; INTRAVENOUS; SOFT TISSUE at 12:52

## 2025-05-08 RX ADMIN — ACETAMINOPHEN 1000 MG: 500 TABLET ORAL at 08:56

## 2025-05-08 RX ADMIN — SODIUM CHLORIDE, POTASSIUM CHLORIDE, SODIUM LACTATE AND CALCIUM CHLORIDE: 600; 310; 30; 20 INJECTION, SOLUTION INTRAVENOUS at 06:35

## 2025-05-08 RX ADMIN — PROPOFOL 180 MG: 10 INJECTION, EMULSION INTRAVENOUS at 12:48

## 2025-05-08 RX ADMIN — MIDAZOLAM HYDROCHLORIDE 2 MG: 1 INJECTION, SOLUTION INTRAMUSCULAR; INTRAVENOUS at 12:41

## 2025-05-08 RX ADMIN — FENTANYL CITRATE 50 MCG: 50 INJECTION, SOLUTION INTRAMUSCULAR; INTRAVENOUS at 13:35

## 2025-05-08 RX ADMIN — FENTANYL CITRATE 50 MCG: 50 INJECTION, SOLUTION INTRAMUSCULAR; INTRAVENOUS at 12:45

## 2025-05-08 RX ADMIN — OXYCODONE HYDROCHLORIDE 10 MG: 10 TABLET ORAL at 18:29

## 2025-05-08 RX ADMIN — OXYCODONE HYDROCHLORIDE 5 MG: 5 SOLUTION ORAL at 14:32

## 2025-05-08 RX ADMIN — HYDROMORPHONE HYDROCHLORIDE 0.5 MG: 1 INJECTION, SOLUTION INTRAMUSCULAR; INTRAVENOUS; SUBCUTANEOUS at 17:10

## 2025-05-08 RX ADMIN — FENTANYL CITRATE 25 MCG: 50 INJECTION, SOLUTION INTRAMUSCULAR; INTRAVENOUS at 14:32

## 2025-05-08 RX ADMIN — ROCURONIUM BROMIDE 50 MG: 10 INJECTION INTRAVENOUS at 12:48

## 2025-05-08 SDOH — ECONOMIC STABILITY: TRANSPORTATION INSECURITY
IN THE PAST 12 MONTHS, HAS THE LACK OF TRANSPORTATION KEPT YOU FROM MEDICAL APPOINTMENTS OR FROM GETTING MEDICATIONS?: NO

## 2025-05-08 SDOH — ECONOMIC STABILITY: TRANSPORTATION INSECURITY
IN THE PAST 12 MONTHS, HAS LACK OF RELIABLE TRANSPORTATION KEPT YOU FROM MEDICAL APPOINTMENTS, MEETINGS, WORK OR FROM GETTING THINGS NEEDED FOR DAILY LIVING?: NO

## 2025-05-08 ASSESSMENT — LIFESTYLE VARIABLES
TOTAL SCORE: 0
HAVE YOU EVER FELT YOU SHOULD CUT DOWN ON YOUR DRINKING: NO
TOTAL SCORE: 0
ALCOHOL_USE: NO
DOES PATIENT WANT TO STOP DRINKING: NO
EVER HAD A DRINK FIRST THING IN THE MORNING TO STEADY YOUR NERVES TO GET RID OF A HANGOVER: NO
ON A TYPICAL DAY WHEN YOU DRINK ALCOHOL HOW MANY DRINKS DO YOU HAVE: 0
TOTAL SCORE: 0
HOW MANY TIMES IN THE PAST YEAR HAVE YOU HAD 5 OR MORE DRINKS IN A DAY: 0
HAVE PEOPLE ANNOYED YOU BY CRITICIZING YOUR DRINKING: NO
CONSUMPTION TOTAL: NEGATIVE
EVER FELT BAD OR GUILTY ABOUT YOUR DRINKING: NO
AVERAGE NUMBER OF DAYS PER WEEK YOU HAVE A DRINK CONTAINING ALCOHOL: 0

## 2025-05-08 ASSESSMENT — COGNITIVE AND FUNCTIONAL STATUS - GENERAL
SUGGESTED CMS G CODE MODIFIER DAILY ACTIVITY: CH
DAILY ACTIVITIY SCORE: 24
SUGGESTED CMS G CODE MODIFIER MOBILITY: CH
MOBILITY SCORE: 24

## 2025-05-08 ASSESSMENT — SOCIAL DETERMINANTS OF HEALTH (SDOH)
WITHIN THE LAST YEAR, HAVE YOU BEEN KICKED, HIT, SLAPPED, OR OTHERWISE PHYSICALLY HURT BY YOUR PARTNER OR EX-PARTNER?: NO
WITHIN THE PAST 12 MONTHS, YOU WORRIED THAT YOUR FOOD WOULD RUN OUT BEFORE YOU GOT THE MONEY TO BUY MORE: NEVER TRUE
WITHIN THE PAST 12 MONTHS, THE FOOD YOU BOUGHT JUST DIDN'T LAST AND YOU DIDN'T HAVE MONEY TO GET MORE: NEVER TRUE
WITHIN THE LAST YEAR, HAVE TO BEEN RAPED OR FORCED TO HAVE ANY KIND OF SEXUAL ACTIVITY BY YOUR PARTNER OR EX-PARTNER?: NO
WITHIN THE LAST YEAR, HAVE YOU BEEN AFRAID OF YOUR PARTNER OR EX-PARTNER?: NO
IN THE PAST 12 MONTHS, HAS THE ELECTRIC, GAS, OIL, OR WATER COMPANY THREATENED TO SHUT OFF SERVICE IN YOUR HOME?: NO
WITHIN THE LAST YEAR, HAVE YOU BEEN HUMILIATED OR EMOTIONALLY ABUSED IN OTHER WAYS BY YOUR PARTNER OR EX-PARTNER?: NO

## 2025-05-08 ASSESSMENT — PATIENT HEALTH QUESTIONNAIRE - PHQ9
SUM OF ALL RESPONSES TO PHQ9 QUESTIONS 1 AND 2: 0
SUM OF ALL RESPONSES TO PHQ9 QUESTIONS 1 AND 2: 0
2. FEELING DOWN, DEPRESSED, IRRITABLE, OR HOPELESS: NOT AT ALL
1. LITTLE INTEREST OR PLEASURE IN DOING THINGS: NOT AT ALL
2. FEELING DOWN, DEPRESSED, IRRITABLE, OR HOPELESS: NOT AT ALL
1. LITTLE INTEREST OR PLEASURE IN DOING THINGS: NOT AT ALL

## 2025-05-08 ASSESSMENT — PAIN DESCRIPTION - PAIN TYPE
TYPE: SURGICAL PAIN
TYPE: ACUTE PAIN;SURGICAL PAIN
TYPE: ACUTE PAIN;SURGICAL PAIN
TYPE: ACUTE PAIN
TYPE: SURGICAL PAIN
TYPE: SURGICAL PAIN

## 2025-05-08 ASSESSMENT — FIBROSIS 4 INDEX
FIB4 SCORE: 0.38
FIB4 SCORE: 0.39
FIB4 SCORE: 0.39

## 2025-05-08 NOTE — ED PROVIDER NOTES
ED Provider Note    CHIEF COMPLAINT  Chief Complaint   Patient presents with    Abdominal Pain    Back Pain     RUQ pain started 11PM last night       EXTERNAL RECORDS REVIEWED  Reviewed right upper quadrant ultrasound obtained 2023, that time showed gallbladder wall thickening and calcified stone    HPI/ROS  LIMITATION TO HISTORY   Select: : None  OUTSIDE HISTORIAN(S):      Sherrell Toro is a 34 y.o. female with past medical history of cholelithiasis presenting to the emergency department for right upper quadrant pain, back pain, 1 episode of vomiting.  Symptoms started around 10:00 saw she noticed right sided back discomfort which progressed to right upper quadrant abdominal discomfort.  She had 1 episode of nonbloody nonbilious emesis.  Says that she ate a large meal at [employer] around 6:00 PM.  She has had a history of cholelithiasis in the past, she has never seen a general surgeon.    Surgical history of  section x 2, hysterectomy, myomectomy    Last oral intake 1800 on 2025    PAST MEDICAL HISTORY   has a past medical history of Depression.    SURGICAL HISTORY   has a past surgical history that includes primary c section; other (Right); myomectomy; ovarian cystectomy (Right); and abdominal hysterectomy total.    FAMILY HISTORY  Family History   Problem Relation Age of Onset    Stroke Mother     Diabetes Mother     Diabetes Father     Diabetes Sister     Diabetes Brother     Cancer Neg Hx     Hyperlipidemia Neg Hx     Hypertension Neg Hx     Heart Disease Neg Hx        SOCIAL HISTORY  Social History     Tobacco Use    Smoking status: Never    Smokeless tobacco: Never   Vaping Use    Vaping status: Never Used   Substance and Sexual Activity    Alcohol use: No    Drug use: No    Sexual activity: Not Currently       CURRENT MEDICATIONS  Home Medications       Reviewed by Tolu Coburn (Pharmacy Tech) on 25 at 0620  Med List Status: Complete     Medication Last Dose  "Status   phentermine (ADIPEX-P) 37.5 MG tablet 2025 Active   triamcinolone acetonide (KENALOG) 0.1 % Cream Not Taking Active                  Audit from Redirected Encounters    **Home medications have not yet been reviewed for this encounter**         ALLERGIES  No Known Allergies    PHYSICAL EXAM  VITAL SIGNS: /65   Pulse 72   Temp 36.1 °C (97 °F) (Temporal)   Resp 16   Ht 1.549 m (5' 1\")   Wt 85.3 kg (188 lb)   LMP 09/15/2023 (Exact Date)   SpO2 97%   BMI 35.52 kg/m²    General: non-toxic, no acute distress  Neuro: oriented x 3, moving all extremities.   HEENT:   - Head: Normocephalic, atraumatic  - Eyes: PERRL  - Ears/Nose: normal external nose and ears  - Mouth: moist mucosal membranes  Resp: clear to auscultation, no increased work of breathing  CV: Regular rate and rhythm  Abd: Soft, tenderness to palpation right upper quadrant.  Positive Messer sign.  No peritoneal findings, no rigidity or guarding.  No right lower quadrant tenderness  : No CVA tenderness percussion.  Extremities: No peripheral edema  Psych: lucid and conversational         DIAGNOSTIC STUDIES / PROCEDURES    LABS and ECG  Results for orders placed or performed during the hospital encounter of 25   EKG    Collection Time: 25  1:11 AM   Result Value Ref Range    Report       [facility] Emergency Dept.    Test Date:  2025  Pt Name:    MARKUS HERNANDEZ        Department: ER  MRN:        [id number]                      Room:  Gender:     Female                       Technician: [id number]  :        1990                   Requested By:ER TRIAGE PROTOCOL  Order #:    [id number]                    Reading MD:    Measurements  Intervals                                Axis  Rate:       77                           P:          77  OR:         148                          QRS:        67  QRSD:       95                           T:          46  QT:         397  QTc:        450    Interpretive " Statements  Sinus rhythm  Compared to ECG 11/07/2023 21:47:48  Q waves no longer present     CBC with Differential    Collection Time: 05/08/25  1:27 AM   Result Value Ref Range    WBC 10.4 4.8 - 10.8 K/uL    RBC 5.22 4.20 - 5.40 M/uL    Hemoglobin 14.2 12.0 - 16.0 g/dL    Hematocrit 43.9 37.0 - 47.0 %    MCV 84.1 81.4 - 97.8 fL    MCH 27.2 27.0 - 33.0 pg    MCHC 32.3 32.2 - 35.5 g/dL    RDW 45.0 35.9 - 50.0 fL    Platelet Count 403 164 - 446 K/uL    MPV 10.1 9.0 - 12.9 fL    Neutrophils-Polys 54.60 44.00 - 72.00 %    Lymphocytes 36.40 22.00 - 41.00 %    Monocytes 6.60 0.00 - 13.40 %    Eosinophils 1.80 0.00 - 6.90 %    Basophils 0.30 0.00 - 1.80 %    Immature Granulocytes 0.30 0.00 - 0.90 %    Nucleated RBC 0.00 0.00 - 0.20 /100 WBC    Neutrophils (Absolute) 5.67 1.82 - 7.42 K/uL    Lymphs (Absolute) 3.79 1.00 - 4.80 K/uL    Monos (Absolute) 0.69 0.00 - 0.85 K/uL    Eos (Absolute) 0.19 0.00 - 0.51 K/uL    Baso (Absolute) 0.03 0.00 - 0.12 K/uL    Immature Granulocytes (abs) 0.03 0.00 - 0.11 K/uL    NRBC (Absolute) 0.00 K/uL   Complete Metabolic Panel    Collection Time: 05/08/25  1:27 AM   Result Value Ref Range    Sodium 138 135 - 145 mmol/L    Potassium 4.8 3.6 - 5.5 mmol/L    Chloride 104 96 - 112 mmol/L    Co2 25 20 - 33 mmol/L    Anion Gap 9.0 7.0 - 16.0    Glucose 124 (H) 65 - 99 mg/dL    Bun 15 8 - 22 mg/dL    Creatinine 0.65 0.50 - 1.40 mg/dL    Calcium 9.7 8.5 - 10.5 mg/dL    Correct Calcium 9.5 8.5 - 10.5 mg/dL    AST(SGOT) 25 12 - 45 U/L    ALT(SGPT) 28 2 - 50 U/L    Alkaline Phosphatase 85 30 - 99 U/L    Total Bilirubin 0.3 0.1 - 1.5 mg/dL    Albumin 4.3 3.2 - 4.9 g/dL    Total Protein 7.7 6.0 - 8.2 g/dL    Globulin 3.4 1.9 - 3.5 g/dL    A-G Ratio 1.3 g/dL   Lipase    Collection Time: 05/08/25  1:27 AM   Result Value Ref Range    Lipase 24 11 - 82 U/L   ESTIMATED GFR    Collection Time: 05/08/25  1:27 AM   Result Value Ref Range    GFR (CKD-EPI) 119 >60 mL/min/1.73 m 2   Urinalysis    Collection Time:  05/08/25  5:00 AM    Specimen: Urine   Result Value Ref Range    Color Yellow     Character Clear     Specific Gravity 1.007 <1.035    Ph 7.0 5.0 - 8.0    Glucose Negative Negative mg/dL    Ketones Negative Negative mg/dL    Protein Negative Negative mg/dL    Bilirubin Negative Negative    Urobilinogen, Urine 0.2 <=1.0 EU/dL    Nitrite Negative Negative    Leukocyte Esterase Negative Negative    Occult Blood Negative Negative    Micro Urine Req see below        RADIOLOGY  I have independently interpreted the diagnostic imaging associated with this visit and am waiting the final reading from the radiologist.   My preliminary interpretation is as follows:   - Right upper quadrant ultrasound shows stones and sludge in the gallbladder fossa, thickening of the gallbladder wall  Radiologist interpretation:   US-RUQ   Final Result         1.  Cholelithiasis and gallbladder sludge with gallbladder wall thickening, sonographically compatible with cholecystitis.   2.  Echogenic liver, compatible with fatty change versus fibrosis.              MEDICAL DECISION MAKING      ED COURSE AND PLAN    34-year-old female presenting to the emergency department for her right upper quadrant pain with radiation to the back with an associated episode of emesis.  She has tenderness to palpation right upper quadrant positive Messer sign.  Labs, urinalysis, ultrasound right upper quadrant ordered.  Ultrasound shows gallbladder wall thickening at 5.3 mm, multiple stones and sludge in the gallbladder fossa.  Her labs are unremarkable she has no leukocytosis LFTs, T. bili are normal.  Lipase normal.  Discussed case with surgeon Dr. Peng for evaluation for acute cholecystitis.              Procedures:      ----------------------------------------------------------------------------------  DISCUSSIONS    I have discussed management of the patient with the following physicians and EDUARDO's: General Surgery    Discussion of management with other [facility]  or appropriate source(s):     Escalation of care considered, and ultimately not performed: Considered antibiotics    Barriers to care at this time, including but not limited to:     Decision tools and prescription drugs considered including, but not limited to:     FINAL IMPRESSION    1. Acute cholecystitis    2. RUQ pain    3. Nausea and vomiting, unspecified vomiting type        New Prescriptions    No medications on file       No follow-up provider specified.      DISPOSITION    Admitted by general surgery service    This chart was dictated using an electronic voice recognition software. The chart has been reviewed and edited but there is still possibility for dictation errors due to limitation of software.    Giancarlo Parker,  5/8/2025

## 2025-05-08 NOTE — ANESTHESIA TIME REPORT
Anesthesia Start and Stop Event Times       Date Time Event    5/8/2025 1229 Ready for Procedure     1243 Anesthesia Start     1409 Anesthesia Stop          Responsible Staff  05/08/25      Name Role Begin End    Janett Damon M.D. Anesth 1243 1407          Overtime Reason:  no overtime (within assigned shift)    Comments:

## 2025-05-08 NOTE — ANESTHESIA PREPROCEDURE EVALUATION
Case: [id number] Date/Time: 25 1225    Procedure: CHOLECYSTECTOMY, LAPAROSCOPIC    Location: [facility]  / SURGERY [facility]    Surgeons: Jose Graham M.D.          33 yo F with history of post-op afib after  and acute cholecystitis.  EKG showing sinus rhythm.  No current CP/SOB/N/V symptoms.     NPO  S/p hysterectomy  Relevant Problems   ANESTHESIA   (negative) History of anesthesia complications      NEURO   (positive) Headache      Other   (positive) Acute cholecystitis   (positive) Prediabetes       Physical Exam    Airway   Mallampati: II  TM distance: >3 FB  Neck ROM: full       Cardiovascular - normal exam  Rhythm: regular  Rate: normal  (-) murmur     Dental - normal exam           Pulmonary - normal exam  Breath sounds clear to auscultation     Abdominal    Neurological - normal exam                   Anesthesia Plan    ASA 2       Plan - general       Airway plan will be ETT          Induction: intravenous    Postoperative Plan: Postoperative administration of opioids is intended.    Pertinent diagnostic labs and testing reviewed    Informed Consent:    Anesthetic plan and risks discussed with patient.    Use of blood products discussed with: patient whom consented to blood products.

## 2025-05-08 NOTE — OR NURSING
Patient arrived to PACU in stable condition.  Oral airway in place, removed at 1427  Surgical lap sites to abdomen x4, closed with dermabond  Medicated for pain per MAR  Patient tolerated clears without nausea or vomiting  Report given to Shanice PHAM. Patient transferred back to T2

## 2025-05-08 NOTE — PROGRESS NOTES
Pt arrived to unit very sleepy, VSS, waken to voice, lap incisions DCI, dermabond in place. Family at beside

## 2025-05-08 NOTE — DISCHARGE INSTRUCTIONS
Post Operative Discharge Instructions:    1. DIET: Upon discharge from the hospital, you may resume your normal preoperative diet, unless specifically directed otherwise. Depending on how you are feeling and whether you have nausea or not, you may wish to stay with a bland diet for the first few days. However, you can advance this as quickly as you feel ready.    2. ACTIVITIES: After discharge from the hospital, you may resume full routine activities; however, there should be no heavy lifting (greater than 20 pounds or a bag of groceries) and no strenuous activities for at least 2 weeks. The duration may be longer, depending on your surgical procedure. Routine activities of daily living are acceptable. Activity level should be addressed at your post-op follow up appointment.    3. DRIVING: You may drive whenever you are off pain medications and are able to perform the activities needed to drive, i.e., turning, bending, twisting, etc.    4. BATHING: You may get the wound wet at any time after leaving the hospital. You may shower, but do not submerge in a bath for at least two weeks.  If you have wound dressings, they may come off after 48 hours.  If you have skin glue to the wound, this will fall off on its own, do not pick at it.  If you have Steri-Strips to the wound, these will fall off on their own, do not pick at them. You may trim the edges if needed.    5. BOWEL FUNCTION:   After surgery, it is not uncommon for patients to develop either frequent or loose stools after meals. This usually corrects itself after a few days, to a few weeks. If this occurs, do not worry; this will resolve on its own.  Constipation is much more common than loose stools. The cause is the combination of pain medication and decreased activity level and possibly the nature of the surgical procedure performed. If you feel this is occurring, you may use an over-the-counter treatment such as MiraLAX (or Milk of Magnesia, Ex-Lax,  Senokot, etc.) until the problem has resolved. Drink plenty of water and try to wean off narcotic pain medications as soon as is comfortable for you.    6. PAIN MEDICATION:   You will be given a prescription for pain medication at discharge. Please take these as directed. It is important to remember not to take medications on an empty stomach as this may cause nausea.  You may also take over the counter acetaminophen and/or NSAIDS (ibuprofen, Aleve, Advil, Motrin) per the package instructions.  You may also use ice to the wound to decrease pain and swelling. You may alternate 20 minutes on and 20 minutes off with the ice for the first 24-48 hours. Make sure you place a washcloth or towel between the ice pack and your skin.  Please note that narcotic pain medication cannot be refilled unless you are seen by a doctor. Make sure you call the office if you are running low on medication or if the dose you have been prescribed is not working well for you.    7.CALL THE [facility] [phone]  (1) Fevers to more than 101F, (2) Unusual chest or leg pain, (3) Drainage or fluid from incision that may be foul smelling, increased tenderness or soreness at the wound or the wound edges are no longer together, redness or swelling at the incision site. Do not hesitate to call with any other questions.

## 2025-05-08 NOTE — ED NOTES
Pharmacy Medication Reconciliation      ~Medication reconciliation updated and complete per patient   ~Allergies have been verified and updated   ~No oral ABX within the last 30 days  ~Is dispense history available in EPIC: No  ~Patient home pharmacy :  [facility] [phone]      ~Anticoagulants (rivaroxaban, apixaban, edoxaban, dabigatran, warfarin, enoxaparin) taken in the last 14 days? No

## 2025-05-08 NOTE — ED TRIAGE NOTES
"Chief Complaint   Patient presents with    Abdominal Pain    Back Pain     RUQ pain started 11PM last night     To triage via WC; states started on her back and gradually went to her RUQ. Took Pepto at home but did not help her at all.     AOX4 GCS 15    BP (!) 142/88   Pulse 83   Temp 36.1 °C (97 °F) (Temporal)   Resp (!) 22   Ht 1.549 m (5' 1\")   Wt 85.3 kg (188 lb)   LMP 09/15/2023 (Exact Date)   SpO2 97%   BMI 35.52 kg/m²     "

## 2025-05-08 NOTE — ANESTHESIA POSTPROCEDURE EVALUATION
Patient: Sherrell Toro    Procedure Summary       Date: 05/08/25 Room / Location: [facility] 09 / SURGERY [facility]    Anesthesia Start: 1243 Anesthesia Stop:     Procedure: CHOLECYSTECTOMY, LAPAROSCOPIC Diagnosis: (Acute cholecystitis)    Surgeons: Jose Graham M.D. Responsible Provider: Janett Damon M.D.    Anesthesia Type: general ASA Status: 2            Final Anesthesia Type: general  Last vitals  BP   Blood Pressure: 131/63    Temp   37 °C (98.6 °F)    Pulse   91   Resp   16    SpO2   93 %      Anesthesia Post Evaluation    Patient location during evaluation: PACU  Patient participation: complete - patient participated  Level of consciousness: awake and alert    Airway patency: patent  Anesthetic complications: no  Cardiovascular status: hemodynamically stable  Respiratory status: acceptable  Hydration status: euvolemic    PONV: none          No notable events documented.     Nurse Pain Score: 3 (NPRS)

## 2025-05-08 NOTE — OP REPORT
DATE OF OPERATION:   5/8/2025     PREOPERATIVE DIAGNOSIS: acute cholecystitis.    POSTOPERATIVE DIAGNOSIS: acute cholecystitis.    PROCEDURE PERFORMED: Laparoscopic cholecystectomy    SURGEON:    Jose Graham M.D.    ASSISTANT:    SKIP Sierra.      ANESTHESIOLOGIST:  Janett Damon M.D.    ANESTHESIA:   General endotracheal anesthesia.    ASA CLASSIFICATION:  II.    INDICATIONS: The patient is a 34 year-old woman with clinical and radiographic findings of acute cholecystitis. She is taken to the operating room for a laparoscopic cholecystectomy.     The nature of the surgical procedure warranted additional skilled operative assistance from an Advanced Registered Nurse Practitioner (ARNP). The assistant was present during the entire operation. The surgical assistant performed the following: provided assistance with optimal surgical exposure of the operative field, operated the camera for the laparoscopic portion of the procedure, and performed the skin closure and dressing application.     FINDINGS: Moderate intraabdominal adhesions right upper quadrant and the liver, gallbladder encased by the omentum. Gallbladder wall thickening and acute inflammation. Normal biliary anatomy, single cystic duct and artery clearly visualized entering the gallbladder prior to clipping and transection. Cyst artery short and visualized coming of the right hepatic artery.    WOUND CLASSIFICATION:  Class II, Clean Contaminated.    SPECIMEN:    Gallbladder.    ESTIMATED BLOOD LOSS:  30 mL.    PROCEDURE: Following informed consent, the patient was properly identified, taken to the operating room and placed in supine position where general endotracheal anesthesia was administered. Intravenous antibiotics were administered by the anesthesiologist in correct time interval. The patient voided prior to surgery. A urinary catheter was not placed. Sequential compression devices were employed. The abdomen was prepped and draped  into a sterile field. A timeout was conducted with the full attention and participation of all operating room personnel.    Marcaine 0.5% was used to infiltrate the port sites. An 11 mm supraumbilical midline incision was made and the subcutaneous tissues spread bluntly. The fascia was elevated with Kocher clamps and sharply incised. The peritoneum was entered bluntly and a Mila trocar inserted into the abdomen. Carbon dioxide pneumoperitoneum was instilled. A 5 mm 30 degree lens and camera was passed into the peritoneal cavity, initial diagnostic laparoscopy found no evidence of injury at the site of entry. An 11 mm port was placed in the epigastric midline under direct vision. Two 5 mm right subcostal ports were placed under direct vision.     The gallbladder was identified and elevated. Dissection was carried out to completely expose and delineate the hepatocystic triangle. The critical view was achieved definitively identifying the single cystic duct and single cystic artery entering the gallbladder. These structures were multiply clipped proximally, once distally and divided. The gallbladder was dissected free from the undersurface of the liver using electrocautery and placed within a laparoscopic specimen retrieval bag. The gallbladder was delivered intact from the abdominal cavity and submitted for pathology.  The gallbladder fossa was inspected. Hemostasis was controlled with electrocautery.    The epigastric port site fascia was approximated using a trocar site closure device with a 0 VICRYL® Plus Antibacterial suture. The abdomen was desufflated and the ports were removed. The umbilical port site fascia was approximated using an 0 VICRYL® Plus Antibacterial figure-of-eight suture. The port site skin incisions were closed with interrupted 4-0 MONOCRYL® subcuticular sutures. A DERMABOND ADVANCED® Topical Skin Adhesive dressing was applied.    The patient tolerated the procedure well, and there were no  apparent complications. All sponge, needle, and instrument counts were correct on 2 separate occasions. The patient was awakened, extubated, and transferred to  the post anesthesia care unit (PACU) in satisfactory condition.       ____________________________________     Jose Graham M.D.    DD: 5/8/2025  1:52 PM

## 2025-05-08 NOTE — PROGRESS NOTES
Report received from Liborio RN. Assume care. Pt is AAOx4.  Assessment completed. VSS. Denies pain, fully ambulatory, Pt was update for the care for the day. White board updated, All question answered. Pt has call light within reach,  bed is in the lowest position. Pt has no other needs at this time.

## 2025-05-08 NOTE — H&P
"    CHIEF COMPLAINT: acute cholecystitis     HISTORY OF PRESENT ILLNESS: The patient is a 34 year-old  woman who presents to the Emergency Department a several-hour history of moderate and severe epigastric and right subcostal abdominal pain. The pain is associated with nausea, vomiting, and malaise. She denies any food intolerance or temporal relationship between symptoms and eating in the past, but tonight this was brought on by eating at [employer].   The patient denies any recent or intercurrent illness. The patient has undergone myomectomy and c section as well as hysterectomy.    PAST MEDICAL HISTORY:  has a past medical history of Depression.    PAST SURGICAL HISTORY:  has a past surgical history that includes primary c section; other (Right); myomectomy; ovarian cystectomy (Right); and abdominal hysterectomy total.    ALLERGIES: No Known Allergies    CURRENT MEDICATIONS:    Home Medications       Reviewed by Tolu Coburn (Pharmacy Tech) on 05/08/25 at 0620  Med List Status: Complete     Medication Last Dose Status   phentermine (ADIPEX-P) 37.5 MG tablet 5/7/2025 Active   triamcinolone acetonide (KENALOG) 0.1 % Cream Not Taking Active                  Audit from Redirected Encounters    **Home medications have not yet been reviewed for this encounter**         FAMILY HISTORY: family history includes Diabetes in her brother, father, mother, and sister; Stroke in her mother.    SOCIAL HISTORY:  reports that she has never smoked. She has never used smokeless tobacco. She reports that she does not drink alcohol and does not use drugs.    REVIEW OF SYSTEMS: Comprehensive review of systems is negative with the exception of the aforementioned HPI, PMH, and PSH bullets in accordance with CMS guidelines.    PHYSICAL EXAMINATION:      Constitutional:     Vital Signs: /65   Pulse 72   Temp 36.1 °C (97 °F) (Temporal)   Resp 16   Ht 1.549 m (5' 1\")   Wt 85.3 kg (188 lb)   SpO2 97% "    General Appearance: appears stated age.  HEENT: The pupils are equal, round, and reactive to light bilaterally. The extraocular muscles are intact bilaterally. The sclera are not icteric. Nares and oropharynx are clear.   Neck: Supple. No adenopathy.  Respiratory:   Inspection: Unlabored respirations, no intercostal retractions, paradoxical motion, or accessory muscle use.   Auscultation: normal, clear to auscultation.  Cardiovascular:   Inspection: The skin is warm and dry.  Auscultation: Regular rate and rhythm.   Peripheral Pulses: Normal.   Abdomen:  Inspection: Abdominal inspection reveals no abrasions, contusions, lacerations or penetrating wounds.   Palpation: Palpation is remarkable for moderate tenderness in the epigastric and right subcostal region. No abdominal wall hernias.  Extremities:   Examination of the upper and lower extremities demonstrates no cyanosis edema or clubbing.  Neurologic:   Alert & oriented to person, time and place. Normal motor function. Normal sensory function. No focal deficits noted.    LABORATORY VALUES:   Recent Labs     05/08/25  0127   WBC 10.4   RBC 5.22   HEMOGLOBIN 14.2   HEMATOCRIT 43.9   MCV 84.1   MCH 27.2   MCHC 32.3   RDW 45.0   PLATELETCT 403   MPV 10.1     Recent Labs     05/08/25  0127   SODIUM 138   POTASSIUM 4.8   CHLORIDE 104   CO2 25   GLUCOSE 124*   BUN 15   CREATININE 0.65   CALCIUM 9.7     Recent Labs     05/08/25  0127   ASTSGOT 25   ALTSGPT 28   TBILIRUBIN 0.3   ALKPHOSPHAT 85   GLOBULIN 3.4            IMAGING:   US-RUQ   Final Result         1.  Cholelithiasis and gallbladder sludge with gallbladder wall thickening, sonographically compatible with cholecystitis.   2.  Echogenic liver, compatible with fatty change versus fibrosis.          ASSESSMENT AND PLAN:     * Acute cholecystitis- (present on admission)  Assessment & Plan  Plan for lap federico today.   NPO.        The patient has Grade I (mild) acute cholecystitis. Plan: Laparoscopic  cholecystectomy.    The patient will be taken to the operating room for Laparoscopic cholecystectomy. The surgical conduct was discussed in detail. Potential complications including, but not limited to, infection, bleeding, damage to adjacent structures, bile duct injury, need to convert to an open procedure, and anesthetic complications were discussed. Questions were elicited and answered to the patient's satisfaction.  Operative consent signed.     DISPOSITION: Admit [facility].     ____________________________________     Nasima Peng M.D.    DD: 5/8/2025  5:36 AM

## 2025-05-08 NOTE — PROGRESS NOTES
PREOPERATIVE NOTE: I have seen and examined the patient today.  Critical physical examination findings, laboratory indices, and radiographic studies reviewed.  I recommend laparoscopic, possible open, cholecystectomy.    The operative strategy was explained and reviewed. Alternatives discussed. Potential complications including, but not limited to: infection; bleeding; damage to adjacent structures, such as the common bile duct; biloma; and anesthetic complications were discussed. Questions were elicited and answered to the patient's satisfaction.  Operative consent signed.        ____________________________________     Jose Graham M.D.    DD: 5/8/2025  12:26 PM

## 2025-05-08 NOTE — ANESTHESIA PROCEDURE NOTES
Airway    Date/Time: 5/8/2025 12:50 PM    Performed by: Janett Damon M.D.  Authorized by: Janett Damon M.D.    Location:  OR  Urgency:  Elective  Difficult Airway: No    Indications for Airway Management:  Anesthesia      Spontaneous Ventilation: absent    Sedation Level:  Deep  Preoxygenated: Yes    Patient Position:  Sniffing  Mask Difficulty Assessment:  1 - vent by mask  Final Airway Type:  Endotracheal airway  Final Endotracheal Airway:  ETT  Cuffed: Yes    Technique Used for Successful ETT Placement:  Direct laryngoscopy  Devices/Methods Used in Placement:  Anterior pressure/BURP and intubating stylet    Insertion Site:  Oral  Blade Type:  Alfonso  Laryngoscope Blade/Videolaryngoscope Blade Size:  3  ETT Size (mm):  7.0  Measured from:  Teeth  ETT to Teeth (cm):  21  Placement Verified by: auscultation and capnometry    Cormack-Lehane Classification:  Grade IIb - view of arytenoids or posterior of glottis only  Number of Attempts at Approach:  1

## 2025-05-09 VITALS
HEART RATE: 95 BPM | HEIGHT: 61 IN | OXYGEN SATURATION: 98 % | TEMPERATURE: 99.6 F | RESPIRATION RATE: 20 BRPM | BODY MASS INDEX: 35.87 KG/M2 | WEIGHT: 190 LBS | SYSTOLIC BLOOD PRESSURE: 114 MMHG | DIASTOLIC BLOOD PRESSURE: 65 MMHG

## 2025-05-09 LAB
ALBUMIN SERPL BCP-MCNC: 3.8 G/DL (ref 3.2–4.9)
ALBUMIN/GLOB SERPL: 1.1 G/DL
ALP SERPL-CCNC: 81 U/L (ref 30–99)
ALT SERPL-CCNC: 70 U/L (ref 2–50)
ANION GAP SERPL CALC-SCNC: 10 MMOL/L (ref 7–16)
AST SERPL-CCNC: 71 U/L (ref 12–45)
BASOPHILS # BLD AUTO: 0.2 % (ref 0–1.8)
BASOPHILS # BLD: 0.03 K/UL (ref 0–0.12)
BILIRUB SERPL-MCNC: 0.4 MG/DL (ref 0.1–1.5)
BUN SERPL-MCNC: 9 MG/DL (ref 8–22)
CALCIUM ALBUM COR SERPL-MCNC: 9.1 MG/DL (ref 8.5–10.5)
CALCIUM SERPL-MCNC: 8.9 MG/DL (ref 8.5–10.5)
CHLORIDE SERPL-SCNC: 102 MMOL/L (ref 96–112)
CO2 SERPL-SCNC: 24 MMOL/L (ref 20–33)
CREAT SERPL-MCNC: 0.66 MG/DL (ref 0.5–1.4)
EOSINOPHIL # BLD AUTO: 0 K/UL (ref 0–0.51)
EOSINOPHIL NFR BLD: 0 % (ref 0–6.9)
ERYTHROCYTE [DISTWIDTH] IN BLOOD BY AUTOMATED COUNT: 47.7 FL (ref 35.9–50)
GFR SERPLBLD CREATININE-BSD FMLA CKD-EPI: 118 ML/MIN/1.73 M 2
GLOBULIN SER CALC-MCNC: 3.5 G/DL (ref 1.9–3.5)
GLUCOSE SERPL-MCNC: 134 MG/DL (ref 65–99)
HCT VFR BLD AUTO: 42.3 % (ref 37–47)
HGB BLD-MCNC: 13.6 G/DL (ref 12–16)
IMM GRANULOCYTES # BLD AUTO: 0.05 K/UL (ref 0–0.11)
IMM GRANULOCYTES NFR BLD AUTO: 0.4 % (ref 0–0.9)
LYMPHOCYTES # BLD AUTO: 1.48 K/UL (ref 1–4.8)
LYMPHOCYTES NFR BLD: 10.4 % (ref 22–41)
MCH RBC QN AUTO: 27.9 PG (ref 27–33)
MCHC RBC AUTO-ENTMCNC: 32.2 G/DL (ref 32.2–35.5)
MCV RBC AUTO: 86.9 FL (ref 81.4–97.8)
MONOCYTES # BLD AUTO: 0.47 K/UL (ref 0–0.85)
MONOCYTES NFR BLD AUTO: 3.3 % (ref 0–13.4)
NEUTROPHILS # BLD AUTO: 12.22 K/UL (ref 1.82–7.42)
NEUTROPHILS NFR BLD: 85.7 % (ref 44–72)
NRBC # BLD AUTO: 0 K/UL
NRBC BLD-RTO: 0 /100 WBC (ref 0–0.2)
PLATELET # BLD AUTO: 359 K/UL (ref 164–446)
PMV BLD AUTO: 10.4 FL (ref 9–12.9)
POTASSIUM SERPL-SCNC: 4.5 MMOL/L (ref 3.6–5.5)
PROT SERPL-MCNC: 7.3 G/DL (ref 6–8.2)
RBC # BLD AUTO: 4.87 M/UL (ref 4.2–5.4)
SODIUM SERPL-SCNC: 136 MMOL/L (ref 135–145)
WBC # BLD AUTO: 14.3 K/UL (ref 4.8–10.8)

## 2025-05-09 PROCEDURE — G0378 HOSPITAL OBSERVATION PER HR: HCPCS

## 2025-05-09 PROCEDURE — 99024 POSTOP FOLLOW-UP VISIT: CPT | Performed by: NURSE PRACTITIONER

## 2025-05-09 PROCEDURE — 85025 COMPLETE CBC W/AUTO DIFF WBC: CPT

## 2025-05-09 PROCEDURE — 80053 COMPREHEN METABOLIC PANEL: CPT

## 2025-05-09 PROCEDURE — A9270 NON-COVERED ITEM OR SERVICE: HCPCS | Performed by: SURGERY

## 2025-05-09 PROCEDURE — 700102 HCHG RX REV CODE 250 W/ 637 OVERRIDE(OP): Performed by: SURGERY

## 2025-05-09 RX ADMIN — ACETAMINOPHEN 1000 MG: 500 TABLET ORAL at 05:32

## 2025-05-09 ASSESSMENT — PAIN DESCRIPTION - PAIN TYPE: TYPE: ACUTE PAIN

## 2025-05-09 NOTE — PROGRESS NOTES
Bedside shift report received from ANKIT Hardwick. Patient A&Ox4, in bed resting comfortably. 4 lap sites are derma bonded and open to air; clean, dry and intact. Pt reports mild to moderate surgical pain. Bed in lowest, locked position with call light and belongings within reach. Fall precautions reviewed with patient. Patient updated on plan of care.

## 2025-05-09 NOTE — PROGRESS NOTES
4 Eyes Skin Assessment Completed by Tara RN and ANKIT Murdock.    Head WDL  Ears WDL  Nose WDL  Mouth WDL  Neck WDL  Breast/Chest WDL  Shoulder Blades WDL  Spine WDL  (R) Arm/Elbow/Hand WDL  (L) Arm/Elbow/Hand WDL  Abdomen 4 lapsites with dermabond  Groin WDL  Scrotum/Coccyx/Buttocks WDL  (R) Leg WDL  (L) Leg WDL  (R) Heel/Foot/Toe WDL  (L) Heel/Foot/Toe WDL          Devices In Places Pulse Ox and SCD's      Interventions In Place Pillows    Possible Skin Injury No    Pictures Uploaded Into Epic N/A  Wound Consult Placed N/A  RN Wound Prevention Protocol Ordered No

## 2025-05-09 NOTE — PROGRESS NOTES
Assumed care to pt, pt is asleep on bed but arousable, aox4 and complains of soreness refuses pain medications, pt reports abing able to pee during hand over, pt on cont IVF and made sure call bell within reach and to ask assistance if needing help.rounded 2 hourly to attend to  pt needs, lap sites CDI and no oozing noted,

## 2025-05-09 NOTE — PROGRESS NOTES
Discharge orders received.  Patient arrived to the discharge lounge.  PIV removed.  Instructions given, medications reviewed and general discharge education provided to patient.  Follow up appointments discussed.  Patient verbalized understanding of dc instructions and prescriptions.  Patient signed discharge instructions.  Patient verbalized understanding had all belongings with her.  Patient will get meds from [facility] and left with family. Wished patient a speedy recovery.

## 2025-05-09 NOTE — PROGRESS NOTES
Report received from ANKIT nick.  Assumed care of patient.  Assessment complete.  Plan of care gone over with the patient and all concerns addressed.  Patient resting in bed. Patient A & O x 4.  No apparent signs of distress.  Safety precautions in place.  Patient educated to call for assistance.  Hourly rounding in place.

## 2025-05-09 NOTE — PROGRESS NOTES
Tolerating diet  Stayed overnight secondary to pain control  Adequate pain control today    A&O x 4  Respiratory rate even and unlabored  Abd soft  Incisions clear with glue  Minimal expected tenderness  Mobilizing    Home with RX and instructions  No lifting more than 20# x 4 weeks  May shower  Follow up in [facility] clinic in 2 weeks     Requested work note for 2 weeks - discussed she may need FMLA paperwork and if her job requires this then she will need to bring to paperwork to clinic at her follow up visit

## 2025-05-09 NOTE — CARE PLAN
The patient is Stable - Low risk of patient condition declining or worsening    Shift Goals  Clinical Goals: pt will report 3/10 level of pain  Patient Goals: pain relief    Progress made toward(s) clinical / shift goals:  pt reports pain relief of 2/10 after prn and scheduled medications, lapsites intact, pt tolerates po intakes    Patient is not progressing towards the following goals:

## 2025-05-16 ENCOUNTER — OFFICE VISIT (OUTPATIENT)
Dept: URGENT CARE | Facility: CLINIC | Age: 35
End: 2025-05-16
Payer: COMMERCIAL

## 2025-05-16 VITALS
BODY MASS INDEX: 36.36 KG/M2 | HEIGHT: 61 IN | OXYGEN SATURATION: 99 % | SYSTOLIC BLOOD PRESSURE: 102 MMHG | TEMPERATURE: 97 F | WEIGHT: 192.6 LBS | DIASTOLIC BLOOD PRESSURE: 64 MMHG | RESPIRATION RATE: 18 BRPM | HEART RATE: 82 BPM

## 2025-05-16 DIAGNOSIS — J06.9 VIRAL URI WITH COUGH: Primary | ICD-10-CM

## 2025-05-16 LAB
FLUAV RNA SPEC QL NAA+PROBE: NEGATIVE
FLUBV RNA SPEC QL NAA+PROBE: NEGATIVE
RSV RNA SPEC QL NAA+PROBE: NEGATIVE
SARS-COV-2 RNA RESP QL NAA+PROBE: NEGATIVE

## 2025-05-16 PROCEDURE — 0241U POCT CEPHEID COV-2, FLU A/B, RSV - PCR: CPT | Performed by: PHYSICIAN ASSISTANT

## 2025-05-16 PROCEDURE — 99213 OFFICE O/P EST LOW 20 MIN: CPT | Performed by: PHYSICIAN ASSISTANT

## 2025-05-16 PROCEDURE — 3078F DIAST BP <80 MM HG: CPT | Performed by: PHYSICIAN ASSISTANT

## 2025-05-16 PROCEDURE — 3074F SYST BP LT 130 MM HG: CPT | Performed by: PHYSICIAN ASSISTANT

## 2025-05-16 RX ORDER — DEXTROMETHORPHAN HYDROBROMIDE AND PROMETHAZINE HYDROCHLORIDE 15; 6.25 MG/5ML; MG/5ML
5 SYRUP ORAL EVERY 4 HOURS PRN
Qty: 120 ML | Refills: 0 | Status: SHIPPED | OUTPATIENT
Start: 2025-05-16

## 2025-05-16 RX ORDER — BENZONATATE 200 MG/1
200 CAPSULE ORAL 3 TIMES DAILY PRN
Qty: 60 CAPSULE | Refills: 0 | Status: SHIPPED | OUTPATIENT
Start: 2025-05-16

## 2025-05-16 ASSESSMENT — ENCOUNTER SYMPTOMS
SPUTUM PRODUCTION: 0
VOMITING: 0
WHEEZING: 0
CHILLS: 1
SHORTNESS OF BREATH: 0
FEVER: 1
COUGH: 1
SORE THROAT: 1

## 2025-05-16 ASSESSMENT — FIBROSIS 4 INDEX: FIB4 SCORE: 0.78

## 2025-05-16 NOTE — PROGRESS NOTES
"Subjective:   Sherrell Toro  is a 34 y.o. female who presents for Nasal Congestion (X2days  sweats chills/loss of smell and test/)      Other  This is a new problem. The current episode started yesterday. Associated symptoms include chills, congestion, coughing, a fever and a sore throat. Pertinent negatives include no rash or vomiting.   Patient presents urgent care noting nasal congestion fevers chills and bodyaches times last 2 days.  She has had loss of taste and smell.  Of note patient is 8 days out from cholecystectomy.  She states she had been doing well postoperatively until the last 48 hours.  She denies redness or abnormal drainage of surgical incisions.  She denies a history of asthma or pneumonia.  She has had mild cough but has tried to avoid doing so secondary to her surgical incisions.  She denies vomiting.  She notes mild irritation in the throat.    Review of Systems   Constitutional:  Positive for chills and fever.   HENT:  Positive for congestion and sore throat. Negative for ear pain.    Respiratory:  Positive for cough. Negative for sputum production, shortness of breath and wheezing.    Gastrointestinal:  Negative for vomiting.   Skin:  Negative for rash.       Allergies[1]     Objective:   /64   Pulse 82   Temp 36.1 °C (97 °F) (Temporal)   Resp 18   Ht 1.549 m (5' 1\")   Wt 87.4 kg (192 lb 9.6 oz)   LMP 09/15/2023 (Exact Date)   SpO2 99%   BMI 36.39 kg/m²     Physical Exam  Vitals and nursing note reviewed.   Constitutional:       General: She is not in acute distress.     Appearance: She is well-developed. She is not diaphoretic.   HENT:      Head: Normocephalic and atraumatic.      Right Ear: Tympanic membrane, ear canal and external ear normal.      Left Ear: Tympanic membrane, ear canal and external ear normal.      Nose: Nose normal.      Mouth/Throat:      Mouth: Mucous membranes are moist.      Pharynx: Uvula midline. Posterior oropharyngeal erythema ( mild PND) " present. No oropharyngeal exudate.      Tonsils: No tonsillar abscesses.   Eyes:      General: Lids are normal. No scleral icterus.        Right eye: No discharge.         Left eye: No discharge.      Conjunctiva/sclera: Conjunctivae normal.   Pulmonary:      Effort: Pulmonary effort is normal. No respiratory distress.      Breath sounds: Normal breath sounds. No stridor. No decreased breath sounds, wheezing, rhonchi or rales.   Musculoskeletal:         General: Normal range of motion.      Cervical back: Neck supple.   Skin:     General: Skin is warm and dry.      Coloration: Skin is not pale.      Findings: No erythema.   Neurological:      Mental Status: She is alert and oriented to person, place, and time. She is not disoriented.   Psychiatric:         Speech: Speech normal.         Behavior: Behavior normal.     Point-of-care test for COVID RSV and influenza shows all negative    Assessment/Plan:   1. Viral URI with cough  - promethazine-dextromethorphan (PROMETHAZINE-DM) 6.25-15 MG/5ML syrup; Take 5 mL by mouth every four hours as needed for Cough.  Dispense: 120 mL; Refill: 0  - benzonatate (TESSALON) 200 MG capsule; Take 1 Capsule by mouth 3 times a day as needed for Cough.  Dispense: 60 Capsule; Refill: 0  - POCT CoV-2, Flu A/B, RSV by PCR  Supportive care is reviewed with patient/caregiver - recommend to push PO fluids and electrolytes, Cautioned regarding potential for sedation with medication.  Return to clinic with lack of resolution or progression of symptoms.    I have worn an N95 mask, gloves and eye protection for the entire encounter with this patient.     Differential diagnosis, natural history, supportive care, and indications for immediate follow-up discussed.            [1] No Known Allergies

## 2025-05-19 NOTE — PROGRESS NOTES
"    HISTORY OF PRESENT ILLNESS: The patient is a 34 year-old woman who returns to the [facility] for routine follow-up after undergoing a laparoscopic cholecystectomy for acute cholecystitis by [facility]: Jose Graham MD on 5/8/2025. Since surgery, she has been doing well. She has minimal ongoing pain. She has been eating and stooling as normal, no fevers    CURRENT MEDICATIONS:  Current Outpatient Medications   Medication Sig    promethazine-dextromethorphan (PROMETHAZINE-DM) 6.25-15 MG/5ML syrup Take 5 mL by mouth every four hours as needed for Cough.    benzonatate (TESSALON) 200 MG capsule Take 1 Capsule by mouth 3 times a day as needed for Cough.    acetaminophen (TYLENOL) 500 MG Tab Take every 6 hours for the next 3 days then every 6 hours as needed for mild to moderate pain    ibuprofen (MOTRIN) 800 MG Tab Take every 8 hours with food for 3 days then every 8 hours as needed for pain / inflammation    triamcinolone acetonide (KENALOG) 0.1 % Cream Apply to affected area up to 4 times daily as needed for eczema. MAX use is 14 days in a row.    phentermine (ADIPEX-P) 37.5 MG tablet Take 1 Tablet by mouth every morning before breakfast for 90 days. (Patient not taking: Reported on 5/20/2025)     PHYSICAL EXAMINATION:  Vital Signs: /80 (BP Location: Left arm, Patient Position: Sitting, BP Cuff Size: Adult)   Pulse 77   Temp 36.8 °C (98.2 °F) (Temporal)   Ht 1.549 m (5' 1\")   Wt 87.4 kg (192 lb 9.2 oz)   SpO2 97%   Physical Exam  Vitals and nursing note reviewed.   Constitutional:       General: She is not in acute distress.     Appearance: Normal appearance. She is not toxic-appearing.   Eyes:      General:         Right eye: No discharge.         Left eye: No discharge.   Abdominal:      General: Abdomen is flat.      Palpations: Abdomen is soft.      Tenderness: There is no abdominal tenderness.      Comments: Port site incisions well healing, no erythema   Skin:     " General: Skin is warm and dry.      Capillary Refill: Capillary refill takes less than 2 seconds.   Neurological:      General: No focal deficit present.      Mental Status: She is alert and oriented to person, place, and time.   Psychiatric:         Mood and Affect: Mood normal.         PATHOLOGY: Final pathology demonstrated acute on chronic cholecystitis.  No evidence for malignancy.    ASSESSMENT AND PLAN:  Satisfactory progress following a laparoscopic cholecystectomy.  Final postoperative instructions provided. Return to work note. Discharge from the [facility] Follow-up [facility].       ____________________________________     Maggie Baptiste P.A.-C.    DD: 5/19/2025  10:04 AM

## 2025-05-20 ENCOUNTER — OFFICE VISIT (OUTPATIENT)
Dept: SURGERY | Facility: MEDICAL CENTER | Age: 35
End: 2025-05-20
Attending: STUDENT IN AN ORGANIZED HEALTH CARE EDUCATION/TRAINING PROGRAM
Payer: COMMERCIAL

## 2025-05-20 VITALS
OXYGEN SATURATION: 97 % | SYSTOLIC BLOOD PRESSURE: 124 MMHG | HEART RATE: 77 BPM | BODY MASS INDEX: 36.36 KG/M2 | HEIGHT: 61 IN | DIASTOLIC BLOOD PRESSURE: 80 MMHG | WEIGHT: 192.57 LBS | TEMPERATURE: 98.2 F

## 2025-05-20 DIAGNOSIS — K81.0 ACUTE CHOLECYSTITIS: Primary | ICD-10-CM

## 2025-05-20 PROCEDURE — 99024 POSTOP FOLLOW-UP VISIT: CPT

## 2025-05-20 PROCEDURE — 3079F DIAST BP 80-89 MM HG: CPT

## 2025-05-20 PROCEDURE — 3074F SYST BP LT 130 MM HG: CPT

## 2025-05-20 ASSESSMENT — FIBROSIS 4 INDEX: FIB4 SCORE: 0.78

## 2025-06-02 ENCOUNTER — OFFICE VISIT (OUTPATIENT)
Dept: MEDICAL GROUP | Facility: PHYSICIAN GROUP | Age: 35
End: 2025-06-02
Payer: COMMERCIAL

## 2025-06-02 VITALS
HEIGHT: 61 IN | HEART RATE: 109 BPM | RESPIRATION RATE: 17 BRPM | BODY MASS INDEX: 35.76 KG/M2 | SYSTOLIC BLOOD PRESSURE: 110 MMHG | OXYGEN SATURATION: 98 % | TEMPERATURE: 97.9 F | WEIGHT: 189.4 LBS | DIASTOLIC BLOOD PRESSURE: 74 MMHG

## 2025-06-02 DIAGNOSIS — E66.3 OVERWEIGHT: Primary | ICD-10-CM

## 2025-06-02 DIAGNOSIS — R51.9 NONINTRACTABLE HEADACHE, UNSPECIFIED CHRONICITY PATTERN, UNSPECIFIED HEADACHE TYPE: ICD-10-CM

## 2025-06-02 DIAGNOSIS — F43.20 ADJUSTMENT DISORDER, UNSPECIFIED TYPE: ICD-10-CM

## 2025-06-02 PROBLEM — K81.0 ACUTE CHOLECYSTITIS: Status: RESOLVED | Noted: 2025-05-08 | Resolved: 2025-06-02

## 2025-06-02 PROCEDURE — 99214 OFFICE O/P EST MOD 30 MIN: CPT | Performed by: PHYSICIAN ASSISTANT

## 2025-06-02 PROCEDURE — 3074F SYST BP LT 130 MM HG: CPT | Performed by: PHYSICIAN ASSISTANT

## 2025-06-02 PROCEDURE — 3078F DIAST BP <80 MM HG: CPT | Performed by: PHYSICIAN ASSISTANT

## 2025-06-02 ASSESSMENT — ENCOUNTER SYMPTOMS
FEVER: 0
SHORTNESS OF BREATH: 0
CHILLS: 0

## 2025-06-02 ASSESSMENT — FIBROSIS 4 INDEX: FIB4 SCORE: 0.78

## 2025-06-02 NOTE — ASSESSMENT & PLAN NOTE
Chronic, uncontrolled.  Recently  from coparent.  Mom is on hospice.  Started going to the gym.  Starting therapy soon.  2 sisters with schizophrenia and bipolar.  Interested in seeing psychiatry.

## 2025-06-02 NOTE — PROGRESS NOTES
SUBJECTIVE:     CC: Phentermine follow-up    HPI:   Sherrell presents today with the following:    ASSESSMENT & PLAN by Problem:     Problem   Adjustment Disorder    Chronic, uncontrolled.  Starting therapy soon.  2 sisters with schizophrenia and bipolar.  Interested in seeing psychiatry.   Referral placed today.     Overweight    Chronic, uncontrolled.  Improving.  Trying to make some food changes.  Tolerate/continue phentermine 37.5 mg daily (has been off of it from around March 2025).  Doesn't need refill now.  Will follow up if she wants to continue.     Acute Cholecystitis (Resolved)   Headache (Resolved)    Resolved.         Return to clinic May 2026.    Return in about 1 year (around 6/2/2026), or if symptoms worsen or fail to improve.    HPI:     Problem List Items Addressed This Visit       Adjustment disorder    Chronic, uncontrolled.  Recently  from coparent.  Mom is on hospice.  Started going to the gym.  Starting therapy soon.  2 sisters with schizophrenia and bipolar.  Interested in seeing psychiatry.          Relevant Orders    Referral to Behavioral Health    RESOLVED: Headache    Overweight - Primary    Chronic, uncontrolled.  Since delivery her child 3/2023 has had a hard time losing weight.  Currently eating higher protein, increase water intake.  Eating more at home.  Eating more fruits/veg.  Meal prepping.  Activity: tries to go for walks but has been having some family health issues affecting this.  Phentermine 37.5 mg: Not taking it daily. (Has been off of it from around 3/2025)  Denies any side effects to the medication, specifically palpitations and sleep issues.    6/2025: 189#  4/2025: 194#  2/2025: 201#  11/2024: 209#                   ROS:  Review of Systems   Constitutional:  Negative for chills and fever.   Respiratory:  Negative for shortness of breath.    Cardiovascular:  Negative for chest pain.       OBJECTIVE:     Exam:  /74   Pulse (!) 109   Temp 36.6 °C (97.9 °F)  "(Temporal)   Resp 17   Ht 1.549 m (5' 1\")   Wt 85.9 kg (189 lb 6.4 oz)   LMP 09/15/2023 (Exact Date)   SpO2 98%   BMI 35.79 kg/m²  Body mass index is 35.79 kg/m².    Physical Exam  Vitals reviewed.   Constitutional:       General: She is not in acute distress.     Appearance: Normal appearance.   Pulmonary:      Effort: Pulmonary effort is normal.   Neurological:      General: No focal deficit present.      Mental Status: She is alert.   Psychiatric:         Mood and Affect: Mood normal.         Behavior: Behavior normal.         Judgment: Judgment normal.           CHART REVIEW:     Labs:                   Please note that this dictation was created using voice recognition software. I have made every reasonable attempt to correct obvious errors, but I expect that there are errors of grammar and possibly content that I did not discover before finalizing the note.    "

## 2025-06-02 NOTE — ASSESSMENT & PLAN NOTE
Chronic, uncontrolled.  Since delivery her child 3/2023 has had a hard time losing weight.  Currently eating higher protein, increase water intake.  Eating more at home.  Eating more fruits/veg.  Meal prepping.  Activity: tries to go for walks but has been having some family health issues affecting this.  Phentermine 37.5 mg: Not taking it daily. (Has been off of it from around 3/2025)  Denies any side effects to the medication, specifically palpitations and sleep issues.    6/2025: 189#  4/2025: 194#  2/2025: 201#  11/2024: 209#

## 2025-06-11 NOTE — Clinical Note
REFERRAL APPROVAL NOTICE         Sent on June 11, 2025                   Sherrell Toro  [address]  [place] [address]                   Dear MsAmalia Houghes Muna,    After a careful review of the medical information and benefit coverage, [facility] has processed your referral. See below for additional details.    If applicable, you must be actively enrolled with your insurance for coverage of the authorized service. If you have any questions regarding your coverage, please contact your insurance directly.    REFERRAL INFORMATION   Referral #:  [id number]  Referred-To Department    Referred-By Provider:  Behavioral Health    Ladan Lyle P.A.-C.   Behavioral Health Outpatient      [address]  [place] [id number] [phone] [address] [facility] [facility] [address] [facility] [address]  [place] [id number] [phone]    Referral Start Date:  06/02/2025  Referral End Date:   06/02/2026             SCHEDULING  If you do not already have an appointment, please call [phone] to make an appointment.     MORE INFORMATION  If you do not already have a [facility] account, sign up at: [email or web address]  You can access your medical information, make appointments, see lab results, billing information, and more.  If you have questions regarding this referral, please contact  the [facility] Referrals department at:             [phone]. Monday - Friday 8:00AM - 5:00PM.     Sincerely,    [facility]

## (undated) DEVICE — CLIP MED LG INTNL HRZN TI ESCP - (20/BX)

## (undated) DEVICE — SUTURE 4-0 MONOCRYL PLUS PS-2 - 27 INCH (36/BX)

## (undated) DEVICE — TROCAR Z THREAD11MM OPTICAL - NON BLADED(6/BX)

## (undated) DEVICE — TROCAR 5X100 NON BLADED Z-TH - READ KII (6/BX)

## (undated) DEVICE — ELECTRODE DUAL RETURN W/ CORD - (50/PK)

## (undated) DEVICE — SET LEADWIRE 5 LEAD BEDSIDE DISPOSABLE ECG (1SET OF 5/EA)

## (undated) DEVICE — SCISSORS 5MM CVD (6EA/BX)

## (undated) DEVICE — GLOVE SZ 7 BIOGEL PI MICRO - PF LF (50PR/BX 4BX/CA)

## (undated) DEVICE — CANISTER SUCTION 3000ML MECHANICAL FILTER AUTO SHUTOFF MEDI-VAC NONSTERILE LF DISP (40EA/CA)

## (undated) DEVICE — SUTURE GENERAL

## (undated) DEVICE — DERMABOND ADVANCED - (12EA/BX)

## (undated) DEVICE — SUTURE 0 VICRYL PLUS UR-6 - 27 INCH (36/BX)

## (undated) DEVICE — GLOVE BIOGEL ECLIPSE PF LATEX SIZE 7.5

## (undated) DEVICE — SET SUCTION/IRRIGATION WITH DISPOSABLE TIP (6/CA )PART #0250-070-520 IS A SUB

## (undated) DEVICE — BAG RETRIEVAL 10ML (10EA/BX)

## (undated) DEVICE — SET EXTENSION WITH 2 PORTS (48EA/CA) ***PART #2C8610 IS A SUBSTITUTE*****

## (undated) DEVICE — LACTATED RINGERS INJ 1000 ML - (14EA/CA 60CA/PF)

## (undated) DEVICE — SENSOR OXIMETER ADULT SPO2 RD SET (20EA/BX)

## (undated) DEVICE — TROCAR LAPSCP 100MM 12MM NTHRD - (6/BX)

## (undated) DEVICE — GLOVE BIOGEL SZ 7 SURGICAL PF LTX - (50PR/BX 4BX/CA)

## (undated) DEVICE — COVER LIGHT HANDLE ALC PLUS DISP (18EA/BX)

## (undated) DEVICE — TUBING CLEARLINK DUO-VENT - C-FLO (48EA/CA)

## (undated) DEVICE — CANNULA W/SEAL 5X100 Z-THRE - ADED KII (12/BX)

## (undated) DEVICE — CHLORAPREP 26 ML APPLICATOR - ORANGE TINT(25/CA)

## (undated) DEVICE — GLOVE BIOGEL PI INDICATOR SZ 7.5 SURGICAL PF LF -(50/BX 4BX/CA)

## (undated) DEVICE — SODIUM CHL IRRIGATION 0.9% 1000ML (12EA/CA)

## (undated) DEVICE — Device

## (undated) DEVICE — SLEEVE VASO DVT COMPRESSION CALF MED - (10PR/CA)

## (undated) DEVICE — GLOVE BIOGEL INDICATOR SZ 7.5 SURGICAL PF LTX - (50PR/BX 4BX/CA)

## (undated) DEVICE — GOWN WARMING STANDARD FLEX - (30/CA)